# Patient Record
Sex: FEMALE | Race: WHITE | NOT HISPANIC OR LATINO | Employment: OTHER | URBAN - METROPOLITAN AREA
[De-identification: names, ages, dates, MRNs, and addresses within clinical notes are randomized per-mention and may not be internally consistent; named-entity substitution may affect disease eponyms.]

---

## 2018-03-02 ENCOUNTER — COMMUNICATION - HEALTHEAST (OUTPATIENT)
Dept: ENDOCRINOLOGY | Facility: CLINIC | Age: 58
End: 2018-03-02

## 2018-03-05 ENCOUNTER — AMBULATORY - HEALTHEAST (OUTPATIENT)
Dept: PULMONOLOGY | Facility: OTHER | Age: 58
End: 2018-03-05

## 2018-03-05 DIAGNOSIS — R91.1 LUNG NODULE: ICD-10-CM

## 2018-03-06 ENCOUNTER — COMMUNICATION - HEALTHEAST (OUTPATIENT)
Dept: PULMONOLOGY | Facility: OTHER | Age: 58
End: 2018-03-06

## 2018-03-12 ENCOUNTER — OFFICE VISIT - HEALTHEAST (OUTPATIENT)
Dept: GERIATRICS | Facility: CLINIC | Age: 58
End: 2018-03-12

## 2018-03-12 DIAGNOSIS — J90 LOCULATED PLEURAL EFFUSION: ICD-10-CM

## 2018-03-12 DIAGNOSIS — E05.91 THYROTOXICOSIS WITH THYROTOXIC CRISIS, UNSPECIFIED THYROTOXICOSIS TYPE: ICD-10-CM

## 2018-03-12 DIAGNOSIS — Z72.0 TOBACCO USER: ICD-10-CM

## 2018-03-12 DIAGNOSIS — I48.19 PERSISTENT ATRIAL FIBRILLATION (H): ICD-10-CM

## 2018-03-12 DIAGNOSIS — I50.20 SYSTOLIC CHF (H): ICD-10-CM

## 2018-03-12 DIAGNOSIS — I26.09 OTHER ACUTE PULMONARY EMBOLISM WITH ACUTE COR PULMONALE (H): ICD-10-CM

## 2018-03-14 ENCOUNTER — AMBULATORY - HEALTHEAST (OUTPATIENT)
Dept: GERIATRICS | Facility: CLINIC | Age: 58
End: 2018-03-14

## 2018-03-21 ENCOUNTER — COMMUNICATION - HEALTHEAST (OUTPATIENT)
Dept: ENDOCRINOLOGY | Facility: CLINIC | Age: 58
End: 2018-03-21

## 2018-03-21 DIAGNOSIS — E05.91 THYROTOXICOSIS WITH THYROTOXIC CRISIS, UNSPECIFIED THYROTOXICOSIS TYPE: ICD-10-CM

## 2018-03-22 ENCOUNTER — OFFICE VISIT - HEALTHEAST (OUTPATIENT)
Dept: GERIATRICS | Facility: CLINIC | Age: 58
End: 2018-03-22

## 2018-03-22 DIAGNOSIS — I26.99 PULMONARY INFARCT (H): ICD-10-CM

## 2018-03-22 DIAGNOSIS — Z86.718 HISTORY OF DVT (DEEP VEIN THROMBOSIS): ICD-10-CM

## 2018-03-22 DIAGNOSIS — I51.9 LV DYSFUNCTION: ICD-10-CM

## 2018-03-22 DIAGNOSIS — J18.9 PNEUMONIA OF LEFT UPPER LOBE DUE TO INFECTIOUS ORGANISM: ICD-10-CM

## 2018-03-22 DIAGNOSIS — Z72.0 TOBACCO USER: ICD-10-CM

## 2018-03-22 DIAGNOSIS — Z86.711 HISTORY OF PULMONARY EMBOLISM: ICD-10-CM

## 2018-03-22 DIAGNOSIS — Z86.39 HISTORY OF GRAVES' DISEASE: ICD-10-CM

## 2018-03-22 DIAGNOSIS — I48.19 PERSISTENT ATRIAL FIBRILLATION (H): ICD-10-CM

## 2018-03-23 ENCOUNTER — OFFICE VISIT - HEALTHEAST (OUTPATIENT)
Dept: CARDIOLOGY | Facility: CLINIC | Age: 58
End: 2018-03-23

## 2018-03-23 ENCOUNTER — AMBULATORY - HEALTHEAST (OUTPATIENT)
Dept: CARDIOLOGY | Facility: CLINIC | Age: 58
End: 2018-03-23

## 2018-03-23 DIAGNOSIS — I50.23 ACUTE ON CHRONIC SYSTOLIC CONGESTIVE HEART FAILURE (H): ICD-10-CM

## 2018-03-23 DIAGNOSIS — I48.19 PERSISTENT ATRIAL FIBRILLATION (H): ICD-10-CM

## 2018-03-23 DIAGNOSIS — I51.9 LV DYSFUNCTION: ICD-10-CM

## 2018-03-23 DIAGNOSIS — I26.09 OTHER ACUTE PULMONARY EMBOLISM WITH ACUTE COR PULMONALE (H): ICD-10-CM

## 2018-03-23 ASSESSMENT — MIFFLIN-ST. JEOR: SCORE: 1072.56

## 2018-03-25 ENCOUNTER — RECORDS - HEALTHEAST (OUTPATIENT)
Dept: LAB | Facility: CLINIC | Age: 58
End: 2018-03-25

## 2018-03-25 LAB
ANION GAP SERPL CALCULATED.3IONS-SCNC: 12 MMOL/L (ref 5–18)
BUN SERPL-MCNC: 18 MG/DL (ref 8–22)
CALCIUM SERPL-MCNC: 9.4 MG/DL (ref 8.5–10.5)
CHLORIDE BLD-SCNC: 109 MMOL/L (ref 98–107)
CO2 SERPL-SCNC: 20 MMOL/L (ref 22–31)
CREAT SERPL-MCNC: 0.57 MG/DL (ref 0.6–1.1)
GFR SERPL CREATININE-BSD FRML MDRD: >60 ML/MIN/1.73M2
GLUCOSE BLD-MCNC: 67 MG/DL (ref 70–125)
HGB BLD-MCNC: 10.7 G/DL (ref 12–16)
MAGNESIUM SERPL-MCNC: 1.9 MG/DL (ref 1.8–2.6)
POTASSIUM BLD-SCNC: 3.9 MMOL/L (ref 3.5–5)
SODIUM SERPL-SCNC: 141 MMOL/L (ref 136–145)

## 2018-03-26 ENCOUNTER — OFFICE VISIT - HEALTHEAST (OUTPATIENT)
Dept: GERIATRICS | Facility: CLINIC | Age: 58
End: 2018-03-26

## 2018-03-26 DIAGNOSIS — Z86.711 HISTORY OF PULMONARY EMBOLISM: ICD-10-CM

## 2018-03-26 DIAGNOSIS — Z86.718 HISTORY OF DVT (DEEP VEIN THROMBOSIS): ICD-10-CM

## 2018-03-26 DIAGNOSIS — I42.0 DILATED CARDIOMYOPATHY (H): ICD-10-CM

## 2018-03-26 DIAGNOSIS — Z72.0 TOBACCO USER: ICD-10-CM

## 2018-03-26 DIAGNOSIS — I48.19 PERSISTENT ATRIAL FIBRILLATION (H): ICD-10-CM

## 2018-03-26 DIAGNOSIS — Z86.39 HISTORY OF GRAVES' DISEASE: ICD-10-CM

## 2018-03-26 ASSESSMENT — MIFFLIN-ST. JEOR: SCORE: 1068.48

## 2018-03-27 ENCOUNTER — RECORDS - HEALTHEAST (OUTPATIENT)
Dept: LAB | Facility: CLINIC | Age: 58
End: 2018-03-27

## 2018-03-28 LAB
T3FREE SERPL-MCNC: 2.7 PG/ML (ref 1.9–3.9)
T4 FREE SERPL-MCNC: 1.2 NG/DL (ref 0.7–1.8)
TSH SERPL DL<=0.005 MIU/L-ACNC: <0.01 UIU/ML (ref 0.3–5)

## 2018-03-29 ENCOUNTER — OFFICE VISIT - HEALTHEAST (OUTPATIENT)
Dept: ENDOCRINOLOGY | Facility: CLINIC | Age: 58
End: 2018-03-29

## 2018-03-29 ENCOUNTER — AMBULATORY - HEALTHEAST (OUTPATIENT)
Dept: ENDOCRINOLOGY | Facility: CLINIC | Age: 58
End: 2018-03-29

## 2018-03-29 DIAGNOSIS — E05.90 HYPERTHYROIDISM: ICD-10-CM

## 2018-03-29 DIAGNOSIS — E05.91 THYROTOXICOSIS WITH THYROTOXIC CRISIS, UNSPECIFIED THYROTOXICOSIS TYPE: ICD-10-CM

## 2018-03-30 ENCOUNTER — OFFICE VISIT - HEALTHEAST (OUTPATIENT)
Dept: GERIATRICS | Facility: CLINIC | Age: 58
End: 2018-03-30

## 2018-03-30 DIAGNOSIS — I48.19 PERSISTENT ATRIAL FIBRILLATION (H): ICD-10-CM

## 2018-03-30 DIAGNOSIS — I26.99 PULMONARY EMBOLI (H): ICD-10-CM

## 2018-03-30 DIAGNOSIS — E05.00 GRAVES DISEASE: ICD-10-CM

## 2018-03-30 DIAGNOSIS — I26.99 PULMONARY INFARCT (H): ICD-10-CM

## 2018-03-30 DIAGNOSIS — R91.8 PULMONARY MASS: ICD-10-CM

## 2018-03-30 DIAGNOSIS — I51.9 LEFT VENTRICULAR DYSFUNCTION: ICD-10-CM

## 2018-03-30 DIAGNOSIS — J18.9 LEFT UPPER LOBE PNEUMONIA: ICD-10-CM

## 2018-03-30 DIAGNOSIS — I82.4Z1 DVT, LOWER EXTREMITY, DISTAL, ACUTE, RIGHT (H): ICD-10-CM

## 2018-03-30 DIAGNOSIS — F17.200 TOBACCO DEPENDENCE: ICD-10-CM

## 2018-04-02 ENCOUNTER — COMMUNICATION - HEALTHEAST (OUTPATIENT)
Dept: ADMINISTRATIVE | Facility: CLINIC | Age: 58
End: 2018-04-02

## 2018-04-02 ENCOUNTER — OFFICE VISIT - HEALTHEAST (OUTPATIENT)
Dept: CARDIOLOGY | Facility: CLINIC | Age: 58
End: 2018-04-02

## 2018-04-02 ENCOUNTER — AMBULATORY - HEALTHEAST (OUTPATIENT)
Dept: GERIATRICS | Facility: CLINIC | Age: 58
End: 2018-04-02

## 2018-04-02 DIAGNOSIS — I48.91 ATRIAL FIBRILLATION WITH RVR (H): ICD-10-CM

## 2018-04-02 DIAGNOSIS — I42.0 DILATED CARDIOMYOPATHY (H): ICD-10-CM

## 2018-04-02 DIAGNOSIS — I48.19 PERSISTENT ATRIAL FIBRILLATION (H): ICD-10-CM

## 2018-04-02 DIAGNOSIS — I50.23 ACUTE ON CHRONIC SYSTOLIC CONGESTIVE HEART FAILURE (H): ICD-10-CM

## 2018-04-02 ASSESSMENT — MIFFLIN-ST. JEOR: SCORE: 1081.64

## 2018-04-03 ENCOUNTER — AMBULATORY - HEALTHEAST (OUTPATIENT)
Dept: CARDIOLOGY | Facility: CLINIC | Age: 58
End: 2018-04-03

## 2018-04-24 ENCOUNTER — RECORDS - HEALTHEAST (OUTPATIENT)
Dept: ADMINISTRATIVE | Facility: OTHER | Age: 58
End: 2018-04-24

## 2018-04-27 ENCOUNTER — OFFICE VISIT - HEALTHEAST (OUTPATIENT)
Dept: CARDIOLOGY | Facility: CLINIC | Age: 58
End: 2018-04-27

## 2018-04-27 DIAGNOSIS — I48.19 PERSISTENT ATRIAL FIBRILLATION (H): ICD-10-CM

## 2018-04-27 LAB
ATRIAL RATE - MUSE: 72 BPM
DIASTOLIC BLOOD PRESSURE - MUSE: NORMAL MMHG
INTERPRETATION ECG - MUSE: NORMAL
P AXIS - MUSE: NORMAL DEGREES
PR INTERVAL - MUSE: NORMAL MS
QRS DURATION - MUSE: 126 MS
QT - MUSE: 440 MS
QTC - MUSE: 461 MS
R AXIS - MUSE: 78 DEGREES
SYSTOLIC BLOOD PRESSURE - MUSE: NORMAL MMHG
T AXIS - MUSE: -81 DEGREES
VENTRICULAR RATE- MUSE: 66 BPM

## 2018-04-27 RX ORDER — FERROUS SULFATE 325(65) MG
1 TABLET ORAL
Status: SHIPPED | COMMUNITY
Start: 2018-04-27 | End: 2022-10-20

## 2018-04-27 RX ORDER — ESCITALOPRAM OXALATE 10 MG/1
10 TABLET ORAL DAILY
Status: SHIPPED | COMMUNITY
Start: 2018-04-27 | End: 2022-10-20

## 2018-04-27 ASSESSMENT — MIFFLIN-ST. JEOR: SCORE: 1090.71

## 2018-05-02 ENCOUNTER — AMBULATORY - HEALTHEAST (OUTPATIENT)
Dept: CARDIOLOGY | Facility: CLINIC | Age: 58
End: 2018-05-02

## 2018-05-04 ENCOUNTER — ANESTHESIA - HEALTHEAST (OUTPATIENT)
Dept: CARDIOLOGY | Facility: CLINIC | Age: 58
End: 2018-05-04

## 2018-05-17 ENCOUNTER — COMMUNICATION - HEALTHEAST (OUTPATIENT)
Dept: CARDIOLOGY | Facility: CLINIC | Age: 58
End: 2018-05-17

## 2018-06-07 ENCOUNTER — RECORDS - HEALTHEAST (OUTPATIENT)
Dept: ADMINISTRATIVE | Facility: OTHER | Age: 58
End: 2018-06-07

## 2018-07-12 ENCOUNTER — OFFICE VISIT - HEALTHEAST (OUTPATIENT)
Dept: CARDIOLOGY | Facility: CLINIC | Age: 58
End: 2018-07-12

## 2018-07-12 DIAGNOSIS — I48.91 ATRIAL FIBRILLATION WITH RVR (H): ICD-10-CM

## 2018-07-12 DIAGNOSIS — I48.19 PERSISTENT ATRIAL FIBRILLATION (H): ICD-10-CM

## 2018-07-12 DIAGNOSIS — I50.23 ACUTE ON CHRONIC SYSTOLIC CONGESTIVE HEART FAILURE (H): ICD-10-CM

## 2018-07-12 RX ORDER — METOPROLOL TARTRATE 25 MG/1
TABLET, FILM COATED ORAL
Qty: 30 TABLET | Refills: 3 | Status: SHIPPED
Start: 2018-07-12 | End: 2022-10-20

## 2018-07-12 ASSESSMENT — MIFFLIN-ST. JEOR: SCORE: 1131.53

## 2018-09-24 ENCOUNTER — AMBULATORY - HEALTHEAST (OUTPATIENT)
Dept: LAB | Facility: CLINIC | Age: 58
End: 2018-09-24

## 2018-09-24 DIAGNOSIS — E05.91 THYROTOXICOSIS WITH THYROTOXIC CRISIS, UNSPECIFIED THYROTOXICOSIS TYPE: ICD-10-CM

## 2018-09-24 LAB
T3 SERPL-MCNC: 69 NG/DL (ref 45–175)
T4 FREE SERPL-MCNC: 0.7 NG/DL (ref 0.7–1.8)
TSH SERPL DL<=0.005 MIU/L-ACNC: 3.34 UIU/ML (ref 0.3–5)

## 2018-10-01 ENCOUNTER — OFFICE VISIT - HEALTHEAST (OUTPATIENT)
Dept: ENDOCRINOLOGY | Facility: CLINIC | Age: 58
End: 2018-10-01

## 2018-10-01 DIAGNOSIS — I48.91 ATRIAL FIBRILLATION WITH RVR (H): ICD-10-CM

## 2018-10-01 DIAGNOSIS — E05.91 THYROTOXICOSIS WITH THYROTOXIC CRISIS, UNSPECIFIED THYROTOXICOSIS TYPE: ICD-10-CM

## 2018-10-01 RX ORDER — METHIMAZOLE 10 MG/1
10 TABLET ORAL DAILY
Qty: 90 TABLET | Refills: 1 | Status: SHIPPED | OUTPATIENT
Start: 2018-10-01 | End: 2022-10-20

## 2018-10-01 ASSESSMENT — MIFFLIN-ST. JEOR: SCORE: 1140.6

## 2019-02-24 ENCOUNTER — COMMUNICATION - HEALTHEAST (OUTPATIENT)
Dept: CARDIOLOGY | Facility: CLINIC | Age: 59
End: 2019-02-24

## 2019-02-24 DIAGNOSIS — I48.19 PERSISTENT ATRIAL FIBRILLATION (H): ICD-10-CM

## 2019-02-25 RX ORDER — RIVAROXABAN 20 MG/1
TABLET, FILM COATED ORAL
Qty: 30 TABLET | Refills: 1 | Status: ON HOLD | OUTPATIENT
Start: 2019-02-25 | End: 2022-10-23

## 2019-03-08 ENCOUNTER — AMBULATORY - HEALTHEAST (OUTPATIENT)
Dept: ENDOCRINOLOGY | Facility: CLINIC | Age: 59
End: 2019-03-08

## 2019-03-08 DIAGNOSIS — E05.90 HYPERTHYROIDISM: ICD-10-CM

## 2019-03-25 ENCOUNTER — AMBULATORY - HEALTHEAST (OUTPATIENT)
Dept: LAB | Facility: CLINIC | Age: 59
End: 2019-03-25

## 2019-03-25 DIAGNOSIS — E05.90 HYPERTHYROIDISM: ICD-10-CM

## 2019-03-25 LAB
T3 SERPL-MCNC: 90 NG/DL (ref 45–175)
T4 FREE SERPL-MCNC: 1.1 NG/DL (ref 0.7–1.8)
TSH SERPL DL<=0.005 MIU/L-ACNC: 0.04 UIU/ML (ref 0.3–5)

## 2019-04-01 ENCOUNTER — COMMUNICATION - HEALTHEAST (OUTPATIENT)
Dept: ENDOCRINOLOGY | Facility: CLINIC | Age: 59
End: 2019-04-01

## 2019-04-01 DIAGNOSIS — E05.90 HYPERTHYROIDISM: ICD-10-CM

## 2019-07-01 ENCOUNTER — COMMUNICATION - HEALTHEAST (OUTPATIENT)
Dept: CARDIOLOGY | Facility: CLINIC | Age: 59
End: 2019-07-01

## 2020-01-11 ENCOUNTER — COMMUNICATION - HEALTHEAST (OUTPATIENT)
Dept: ENDOCRINOLOGY | Facility: CLINIC | Age: 60
End: 2020-01-11

## 2020-01-11 DIAGNOSIS — E05.90 HYPERTHYROIDISM: ICD-10-CM

## 2020-03-09 ENCOUNTER — COMMUNICATION - HEALTHEAST (OUTPATIENT)
Dept: ADMINISTRATIVE | Facility: CLINIC | Age: 60
End: 2020-03-09

## 2020-03-09 RX ORDER — METHIMAZOLE 5 MG/1
TABLET ORAL
Qty: 90 TABLET | Refills: 0 | Status: SHIPPED | OUTPATIENT
Start: 2020-03-09 | End: 2022-10-20

## 2021-05-27 NOTE — TELEPHONE ENCOUNTER
----- Message from Rashaad Lyons MD sent at 3/31/2019  6:55 AM CDT -----  Please increase tapazole to 15 mg daily. Check same labs in 3 months.

## 2021-05-31 VITALS — WEIGHT: 121 LBS | BODY MASS INDEX: 20.77 KG/M2

## 2021-06-01 VITALS — HEIGHT: 64 IN | BODY MASS INDEX: 19.24 KG/M2

## 2021-06-01 VITALS — WEIGHT: 117 LBS | HEIGHT: 64 IN | BODY MASS INDEX: 19.97 KG/M2

## 2021-06-01 VITALS — HEIGHT: 64 IN | WEIGHT: 115 LBS | BODY MASS INDEX: 19.63 KG/M2

## 2021-06-01 VITALS — BODY MASS INDEX: 21.85 KG/M2 | WEIGHT: 128 LBS | HEIGHT: 64 IN

## 2021-06-01 VITALS — BODY MASS INDEX: 21.51 KG/M2 | HEIGHT: 64 IN | WEIGHT: 126 LBS

## 2021-06-01 VITALS — BODY MASS INDEX: 19.14 KG/M2 | WEIGHT: 112.1 LBS | HEIGHT: 64 IN

## 2021-06-01 VITALS — WEIGHT: 113 LBS | BODY MASS INDEX: 19.29 KG/M2 | HEIGHT: 64 IN

## 2021-06-05 NOTE — TELEPHONE ENCOUNTER
Patient is aware of Dr. Lyons's MARCELLE. She would like a 30 day supply and will est care with another provider.

## 2021-06-06 NOTE — TELEPHONE ENCOUNTER
Date: 7/2/2020 Status: Can   Time: 2:40 PM Length: 40   Visit Type: OFFICE VISIT LONG [[id number]] Copay: $0.00   Provider: Princess Rangel NP Department: [facility] ENDOCRINOLOGY   Referring Provider: JEFFY NGUEYN CSN: [id number]   Notes: PAMELA of [place] - thyroid   Made On:  Canceled: 3/10/2020 4:13 PM  3/11/2020 9:41 AM By:  By: CAMMY ERIC STEPHANIE   Cancel Rsn: Provider Initiated (per BK, pt should see an endo provider / pt notified)     Pt states she wants to stay in the [place] area. Notified her that she can check with her insurance to make sure they are in network. But there are [facility] and [facility]. She will check with [facility].

## 2021-06-06 NOTE — TELEPHONE ENCOUNTER
Pt has an appt with harshil in July. She will try see if PCP can do med refills until she is seen.

## 2021-06-06 NOTE — TELEPHONE ENCOUNTER
She does indeed look a bit tricky - I certainly would not be in the position to get her through another crisis. I think that she should be with an actual Endo, but I can manage until this can be arranged.

## 2021-06-06 NOTE — TELEPHONE ENCOUNTER
Patient called. She was not able to find another provider and would like another 30 day supply if possible and will continue to find another provider.     Kallie @ [phone]

## 2021-06-16 PROBLEM — Z86.718 HISTORY OF DVT (DEEP VEIN THROMBOSIS): Status: ACTIVE | Noted: 2018-03-26

## 2021-06-16 PROBLEM — J18.9 PNEUMONIA: Status: ACTIVE | Noted: 2018-03-12

## 2021-06-16 PROBLEM — I48.19 PERSISTENT ATRIAL FIBRILLATION (H): Status: ACTIVE | Noted: 2018-03-02

## 2021-06-16 PROBLEM — D72.829 LEUKOCYTOSIS: Status: ACTIVE | Noted: 2018-03-15

## 2021-06-16 NOTE — PROGRESS NOTES
[facility]    Name:   Kallie Barker  : 1960  Facility:   [facility] [[id number]]   Room: TCU2-210  Code Status: FULL CODE -   Fac type:   SNF (Skilled Nursing Facility, TCU) -     CHIEF COMPLAINT / REASON FOR VISIT:  Chief Complaint   Patient presents with     Review Of Multiple Medical Conditions     TCU follow-up after hospitalization with pneumonia.  She had been hospitalized not that long before with DVT/PE, atrial fibrillation with RVR, and acute systolic CHF (with biventricular dysfunction and an EF of 20%).   [facility] from 18 until 03/10/18  [facility] from 03/10/18 until 18  [facility] from 18 until 18    Patient was last seen by Dr. Levy for an admission visit on 18.    HPI: Kallie is a 58 y.o. female who claims she was not feeling well since New Year's Anabela.  She continued to get worse and was sleeping on her couch, as it was too much trouble going upstairs to the bedroom.  When she began having trouble getting to the bathroom even with her 's health, she found herself at [facility] where she was found to have:    Acute submassive pulmonary embolism, right leg DVT  RV dysfunction (at one point believed to be biventricular)  New atrial fibrillation with RVR  Acute systolic CHF (EF 20%)  Loculated large left pleural effusion, status post thoracentesis   Questionable left upper lobe lung mass (identified on chest CT)  Acute hypoxemic respiratory failure (discharged on oxygen)  Leukocytosis (likely felt secondary to pulmonary infarct)  History of Graves' disease and likely thyrotoxicosis  Tobacco use (heavy, estimating 3 packs per day)    She had been diagnosed with Graves' disease in  but never started taking her methimazole.    She left there after 8 days with a subsequent trip to the [facility] TCU.  There, she spiked a temp of 102 F, irregular heartbeats,  elevated white count, and hypoxia, and was sent to [facility].  A CT of the chest showed improved bilateral PE but extensive consolidation, and the patient was treated for pneumonia with 7 days of vancomycin and Zosyn.  She was subsequently seen by pulmonary, recommending a 14 day course of doxycycline.  She was also treated for congestive heart failure and atrial fibrillation (with uncontrolled heart rate per cardiology).    She was seen by cardiology and an electrophysiologist, and her digoxin and metoprolol were increased.  She will be reevaluated for possible ablation once her current medical condition improves.  She underwent repeat echocardiograms, and her ejection fraction improved.  It was 35% on 03/13/18 and 52% on 03/19/18.  She was discharged with a Holter monitor, and it was suggested that she would need close outpatient cardiology follow-up.  [facility] cardiology was consulted, and they recommended cardioversion once patient has been anticoagulated for a few weeks, and her hyperthyroidism is well treated.  Of course, she will also need follow-up with endocrinology.    Note that the chest CT did show persistent soft tissue thickening of the left hilum, and an underlying mass cannot be excluded.  Again, a short-term chest CT follow-up is recommended after treatment.      CURRENT ISSUES    The patient tells me she has been a smoker her whole life, and she is currently on a 14 mg nicotine transdermal patch.    She feels she is doing well.  She no longer requires oxygen.  She knows she is here to get stronger.    ROS: No headaches or chest pains, coughing or congestion, nausea or vomiting, dizziness or dyspnea, dysuria, constipation or diarrhea, difficulty chewing or swallowing, integumentary issues, or problems with appetite or sleep.    Past Medical History:   Diagnosis Date     Acute respiratory failure      Atrial fibrillation      Atrial fibrillation with RVR      Loculated pleural effusion       Pulmonary embolism      Systolic CHF      Thyrotoxicosis with thyrotoxic crisis               No family history on file.  Social History     Social History     Marital status:      Spouse name: N/A     Number of children: N/A     Years of education: N/A     Social History Main Topics     Smoking status: Former Smoker     Packs/day: 1.00     Years: 30.00     Types: Cigarettes     Quit date: 2/23/2018     Smokeless tobacco: Never Used     Alcohol use No     Drug use: No     Sexual activity: Not on file     Other Topics Concern     Not on file     Social History Narrative     MEDICATIONS: Reviewed from the MAR, physician orders, and earlier progress notes.  Current Outpatient Prescriptions   Medication Sig     dextromethorphan (DELSYM 12 HOUR) 30 mg/5 mL liquid Take 60 mg by mouth 2 (two) times a day as needed for cough.     digoxin (LANOXIN) 250 mcg tablet Take 1 tablet (250 mcg total) by mouth daily with supper.     doxycycline (MONODOX) 100 MG capsule Take 1 capsule (100 mg total) by mouth 2 (two) times a day for 7 days.     ferrous sulfate 325 (65 FE) MG tablet Take 1 tablet (325 mg total) by mouth 2 (two) times a day with meals.     furosemide (LASIX) 20 MG tablet Take 2 tablets (40 mg total) by mouth daily.     methIMAzole (TAPAZOLE) 10 MG tablet Take 1 tablet (10 mg total) by mouth 3 (three) times a day.     metoprolol tartrate (LOPRESSOR) 100 MG tablet Take 1 tablet (100 mg total) by mouth 2 (two) times a day.     nicotine (NICODERM CQ) 14 mg/24 hr Place 1 patch on the skin daily.     omeprazole (PRILOSEC) 20 MG capsule Take 1 capsule (20 mg total) by mouth daily before breakfast for 14 days.     rivaroxaban 15 mg Tab Take 1 tablet (15 mg total) by mouth 2 (two) times a day with meals. 15mg BID for 3  days followed by 20mg daily with food indefinitely     ALLERGIES: No Known Allergies    DIET: Cardiac, 2 g sodium restriction, regular texture, thin liquids.    Vitals:    03/26/18 1607   BP: 120/65  "  Pulse: 67   Resp: 18   Temp: 99.2  F (37.3  C)   Weight: 112 lb 1.6 oz (50.8 kg)   Height: 5' 4\" (1.626 m)     Body mass index is 19.24 kg/(m^2).    EXAMINATION:   General: Pleasant, alert, and conversant middle-aged female, sitting on her bed, in no apparent distress.  Head: Normocephalic and atraumatic.   Eyes: PERRLA, sclerae clear.   ENT: Moist oral mucosa.  She has her own teeth.  No rhinorrhea or nasal discharge.  Hearing is unimpaired.  She has mild goiter.  Cardiovascular: Irregularly irregular rhythm with a wide split S2 and a 3/6 systolic ejection murmur at the left sternal border.  Respiratory: Lungs clear to auscultation bilaterally.   Abdomen: Soft and nontender.   Musculoskeletal/Extremities: No peripheral edema.  Integument: No rashes, clinically significant lesions, or skin breakdown.   Cognitive/Psychiatric: Alert and oriented ×3.  Affect is euthymic.    DIAGNOSTICS:   Results for orders placed or performed during the hospital encounter of 03/12/18   Basic Metabolic Panel   Result Value Ref Range    Sodium 135 (L) 136 - 145 mmol/L    Potassium 4.1 3.5 - 5.0 mmol/L    Chloride 105 98 - 107 mmol/L    CO2 23 22 - 31 mmol/L    Anion Gap, Calculation 7 5 - 18 mmol/L    Glucose 95 70 - 125 mg/dL    Calcium 9.1 8.5 - 10.5 mg/dL    BUN 14 8 - 22 mg/dL    Creatinine 0.57 (L) 0.60 - 1.10 mg/dL    GFR MDRD Af Amer >60 >60 mL/min/1.73m2    GFR MDRD Non Af Amer >60 >60 mL/min/1.73m2     Lab Results   Component Value Date    WBC 9.6 03/21/2018    HGB 10.9 (L) 03/21/2018    HCT 35.3 03/21/2018    MCV 75 (L) 03/21/2018     (H) 03/21/2018     CrCl cannot be calculated (Patient's most recent sCr result is older than the maximum 5 days allowed.).     Lab Results   Component Value Date    TSH <0.01 (L) 03/02/2018     ASSESSMENT/Plan:      ICD-10-CM    1. Dilated cardiomyopathy I42.0    2. History of Graves' disease Z86.39    3. Persistent atrial fibrillation I48.1    4. Tobacco user Z72.0    5. History of DVT " (deep vein thrombosis) Z86.718    6. History of pulmonary embolism Z86.711      CHANGES:    None.    CARE PLAN:    The care plan has been reviewed and all orders signed. Changes to care plan, if any, as noted. Otherwise, continue care plan of care.  Time spent with this complicated patient was approximately 40 minutes, with greater than 50% spent in counseling and coordination of care that included a lengthy discussion with the patient regarding her history, how she felt about her own health care management (including tobacco cessation), and many follow-up appointments that will follow once she is discharged.    The above has been created using voice recognition software. Please be aware that this may unintentionally  produce inaccuracies and/or nonsensical sentences.      Electronically signed by: Abhinav Solis CNP

## 2021-06-16 NOTE — PROGRESS NOTES
Assessment/Plan:     1.  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  Noted to have her ejection fraction around 35% on 3/13/2018 likely tachy-mediated and was improved to 52% on 3/19/2018. No acute signs and symptoms of heart failure.  Reports mild shortness of breath on exertion.  She has been participating in rehab for 30 minutes at a time without issues. She is following low-sodium diet and her weight has been stable.  She reports drinking fluid about 80+ ounces per day.    No medication changes made today.  We discussed about heart failure symptoms, medication management, and lifestyle management including low-sodium diet and regular physical exercise.  She met with the heart failure nurse clinician for heart failure education and management.  Her most recent BMP was done on 3/21/2018 and was stable.    Heart Failure Medications include:  - Metoprolol Tartrate 100 mg BID. May consider changing metoprolol succinate although her EF has normalized.  - Not started on ACEI/ARB today given same above reason.  - Furosemide 40 mg daily.     2.  Persistent atrial fibrillation: Heart rate controlled in 70s.  Patient reports her heart rate has been monitored during her physical therapy and has been stable although she was not able to recall the exact number.  Encouraged to call if her heart rate stays persistently >110 and having symptoms of chest discomfort, lightheaded dizziness, heart palpitation or shortness of breath.     [id number] Score is 4 and she is on Xarelto 15 mg twice a day.  Per discharge instructions to stay on Xarelto 15 mg twice a day for 3 days and then switch to 20 mg daily and possible cardioversion once on anticoagulation for at least 3 weeks.  Patient is scheduled to be seen in A. fib clinic on 4/2/18. Per hospital note,  on Xarelto since 3/10/18. Dr. Correa recommended Holter monitor but patient declined.  On digoxin 240 MCG  daily and metoprolol tartrate 100 mg twice a day.  Her digoxin level was 0.5 on 3/18/2018 and was recommended to recheck given her digoxin was recently increased.      Her blood pressure is 108/72, heart rate 72.      3.  Recent diagnosis of PE: Stable -on anticoagulant.  Defer to PCP     4.  Graves' disease : On methimazole 10 mg daily she is scheduled to see endocrinology on 3/29/2018 .     Follow up with Sidra Baltazar CNP on 4/2/18 as scheduled. F/u with Dr. Gonzales in 2-3 weeks and HF clinic in 6-8 weeks    Subjective:     Kallie Barker is a 58 y.o. years old  with a significant PMH of pulmonary embolism, Graves' disease, pleural effusion, cataract (needs cataract surgery), thyrotoxicosis with thyrotoxic crisis, pulmonary infarct, dilated cardiomyopathy with systolic dysfunction, and atrial fibrillation with RVR who is seen at [facility] for posthospitalization follow up per Dr. Correa.  She was hospitalized from 3/12/18-3/22/2018 with hypoxia, fever, and elevated white blood count.  She was also noted to be in heart failure exacerbation with BNP elevated up in 2000 's and A. fib with RVR.  Her metoprolol and digoxin doses were increased.  Her recent echo showed an EF of 35% and repeat echo on 3/19/2018 showed an EF of 52%.  She was followed by Dr. Correa in the hospital and recommended Holter as an outpatient. Dr. Correa also suggested stress test as an out patient for ischemic evaluation. She was also followed by Dr. Villaseñor and recommended cardioversion once she is on the anticoagulation for appropriate time andher hyperthyroidism is treated well.    Patient presented to the heart care clinic accompanied by her .  She is currently at the TCU undergoing rehab.  Since she was discharged from the hospital yesterday, she reports she has been doing well and feeling better.  She has been able to participate in therapy for 30 minutes session at a time without difficulties although complains of having  mild shortness of breath on exertion.  She further reported that they have been monitoring her heart rate during the activity and has been stable although she is not able to recall the exact number.  Patient declined to wear Holter monitor as an outpatient also reported vision impairment from the cataract and needs cataract surgery. She denies fatigue, lightheadedness, shortness of breath, orthopnea, PND, palpitations, chest pain, abdominal fullness/bloating and lower extremity edema.      She reports her weight has been remained stable at the facility although unable to give the exact number. Her weight today in the clinic was 113 lbs. She has been following low-sodium diet and participating in the rehab.     Review of Systems:   General: Fever, Night Sweats  Eyes: Visual Distubance  Ears/Nose/Throat: WNL  Lungs: Shortness of Breath  Heart: Shortness of Breath with activity, Irregular Heartbeat  Stomach: WNL  Bladder: WNL  Muscle/Joints: Muscle Weakness  Skin: WNL  Nervous System: Daytime Sleepiness  Mental Health: WNL     Blood: Easy Bruising     Patient Active Problem List   Diagnosis     Persistent atrial fibrillation     Loculated pleural effusion     PE     Thyrotoxicosis with thyrotoxic crisis, unspecified thyrotoxicosis type     Tobacco user     Pneumonia     Pulmonary infarct     History of pulmonary embolism     History of Graves' disease     Dilated cardiomyopathy     Acute on chronic systolic congestive heart failure     Leukocytosis     Fever, unspecified fever cause     LV dysfunction       Past Medical History:   Diagnosis Date     Acute respiratory failure      Atrial fibrillation      Atrial fibrillation with RVR      Loculated pleural effusion      Pulmonary embolism      Systolic CHF      Thyrotoxicosis with thyrotoxic crisis        Past Surgical History:   Procedure Laterality Date     PICC  3/13/2018            No family history on file.    Social History     Social History     Marital status:       Spouse name: N/A     Number of children: N/A     Years of education: N/A     Occupational History     Not on file.     Social History Main Topics     Smoking status: Former Smoker     Packs/day: 1.00     Years: 30.00     Types: Cigarettes     Quit date: 2/23/2018     Smokeless tobacco: Never Used     Alcohol use No     Drug use: No     Sexual activity: Not on file     Other Topics Concern     Not on file     Social History Narrative       Current Outpatient Prescriptions   Medication Sig Dispense Refill     dextromethorphan (DELSYM 12 HOUR) 30 mg/5 mL liquid Take 60 mg by mouth 2 (two) times a day as needed for cough.       digoxin (LANOXIN) 250 mcg tablet Take 1 tablet (250 mcg total) by mouth daily with supper. 30 tablet 0     doxycycline (MONODOX) 100 MG capsule Take 1 capsule (100 mg total) by mouth 2 (two) times a day for 7 days. 14 capsule 0     ferrous sulfate 325 (65 FE) MG tablet Take 1 tablet (325 mg total) by mouth 2 (two) times a day with meals.  0     furosemide (LASIX) 20 MG tablet Take 2 tablets (40 mg total) by mouth daily. 30 tablet 0     methIMAzole (TAPAZOLE) 10 MG tablet Take 1 tablet (10 mg total) by mouth 3 (three) times a day. 90 tablet 0     metoprolol tartrate (LOPRESSOR) 100 MG tablet Take 1 tablet (100 mg total) by mouth 2 (two) times a day. 60 tablet 0     nicotine (NICODERM CQ) 14 mg/24 hr Place 1 patch on the skin daily. 28 patch 0     omeprazole (PRILOSEC) 20 MG capsule Take 1 capsule (20 mg total) by mouth daily before breakfast for 14 days. 14 capsule 0     rivaroxaban 15 mg Tab Take 1 tablet (15 mg total) by mouth 2 (two) times a day with meals. 15mg BID for 3  days followed by 20mg daily with food indefinitely 30 tablet 0     No current facility-administered medications for this visit.        No Known Allergies    Objective:     Vitals:    03/23/18 1255   BP: 108/72   Pulse: 72   Resp: 18     Body mass index is 19.4 kg/(m^2).    General Appearance:   Alert, cooperative  and in no acute distress.   HEENT:  No scleral icterus; the mucous membranes were pink and moist. No JVD and negative HJR.   Chest: The spine was straight. The chest was symmetric.   Lungs:   Respirations unlabored; the lungs are clear to auscultation.   Cardiovascular:   Regular rhythm. S1 and S2 without murmur, clicks or rubs. Carotid pulses are intact and symmetrical.  No carotid/radial/pedal pulses are intact. CMS intact. No carotid bruits noted.   Abdomen:  Soft, nontender, nondistended, bowel sounds present   Extremities: No cyanosis, clubbing, or edema.   Skin: No xanthelasma.   Neurologic: Mood and affect are appropriate.             Lab Review   Lab Results   Component Value Date    CREATININE 0.57 (L) 03/21/2018    BUN 14 03/21/2018     (L) 03/21/2018    K 4.1 03/22/2018     03/21/2018    CO2 23 03/21/2018     Creatinine (mg/dL)   Date Value   03/21/2018 0.57 (L)   03/19/2018 0.49 (L)   03/16/2018 0.53 (L)   03/13/2018 0.55 (L)       Cardiographics:  Echocardiogram on 3/19/2018  Summary        Left Ventricle: Normal left ventricular size.The calculated left ventricular ejection fraction is 52%. Mild concentric hypertrophy noted.    Right Ventricle: The right ventricle is mildly dilated. The systolic function is mildly reduced. TAPSE is abnormal, which is consistent with abnormal right ventricular systolic function.    Tricuspid Valve: Mild to moderate tricuspid valve regurgitation. The estimated systolic pulmonary artery pressure is 47+ right atrial pressure mmhg.       40 minutes were spent with the patient with greater than 50% spent on education and counseling.      Lily Olivas, [facility]

## 2021-06-16 NOTE — PROGRESS NOTES
Patient and spouse seen in clinic for HF education s/p recent hospital discharge 3/10/18 and 3/23/18.  Reviewed HF Binder that includes the  HF Sx Awareness & Action plan  handout and  A Stronger Pump  booklet and Weight log booklet highlighting :  __X_patient s type of heart failure _X__Na management in diet  __X_importance of daily wts  _X__Fluid Guidelines, if applicable  __X_medication review and importance of compliance     Instructed patient and spouse in signs and sx of heart failure, reiterated when to call clinic - reviewed HF hotline # [phone] and after hours call # [phone].  Majority of time was spent reviewing: medications, and symptoms.  Patient and spouse verbalized understanding of HF discussion.  Plan for f/u with continued HF education reviewed.  No formal f/u scheduled with nurse clinician for continued education - will continue to reinforce HF management education.

## 2021-06-16 NOTE — PROGRESS NOTES
[facility]      Code Status:  FULL CODE  Visit Type: H & P     Facility:  [facility] [[id number]]           History of Present Illness: Kallie Barker is a 58 y.o. female was currently admitted to the TCU as a transfer from the hospital.  This is a elderly patient with a known history of Graves' disease diagnosed in 2011 was elected not to take any treatment.  She presented to the hospital with ongoing shortness of breath with flulike symptoms for the last 2 months with persistent diarrhea.  She was admitted with an acute large left-sided pulmonary embolism as well as a small right-sided pulmonary emboli and right heart failure with acute hypoxemic respiratory failure.  She was noted to have a loculated left-sided pleural effusion along with atrial fibrillation and there was a concern for thyroid storm along with sepsis lactic acidosis as well as chronic diarrhea.  Initially patient was not given any anticoagulants as it was felt that her lung mass may be relatable to an underlying malignancy and workup however revealed also that she had a right lower extremity DVT she was started on heparin transition over to Xarelto  Cardiology saw her for A. rachael and currently has been discharged on metoprolol and Cardizem  Echocardiogram revealed that she had biventricular dysfunction EF of 20% she was given IV Lasix and now has been discharged on oral Lasix she underwent a thoracentesis in the hospital  And this was felt to be a transudate more consistent with fluid overload no workup has come positive for malignancy as yet and they she will have a follow-up CT chest in the clinic in about 2 months time  Her T3 and T4 levels were checked in the hospital T3 was normal T4 was high.  She was treated for acute thyrotoxicosis with steroids potassium iodide and methimazole she became febrile in the hospital felt to be secondary to pulmonary infarct she has been given empiric Levaquin  She  also had some acute hypoxic respiratory failure requiring oxygen and has been discharged to the TCU.  She been complaining of some left-sided chest pain but otherwise seems to be stable  Appears very weak and stated that she could not walk very well    No past medical history on file.   History of hypothyroidism for which she was refusing treatment  No past surgical history on file.  No family history on file.  Social History     Social History     Marital status:      Spouse name: N/A     Number of children: N/A     Years of education: N/A     Occupational History     Not on file.     Social History Main Topics     Smoking status: Former Smoker     Packs/day: 1.00     Years: 30.00     Types: Cigarettes     Quit date: 2/23/2018     Smokeless tobacco: Not on file     Alcohol use No     Drug use: No     Sexual activity: Not on file     Other Topics Concern     Not on file     Social History Narrative     No narrative on file     Current Outpatient Prescriptions   Medication Sig Dispense Refill     dextromethorphan (DELSYM 12 HOUR) 30 mg/5 mL liquid Take 60 mg by mouth 2 (two) times a day as needed for cough.       diltiazem (DILACOR XR) 180 MG 24 hr capsule Take 1 capsule (180 mg total) by mouth daily. 30 capsule 0     furosemide (LASIX) 20 MG tablet Take 1 tablet (20 mg total) by mouth daily. 30 tablet 0     levoFLOXacin (LEVAQUIN) 750 MG tablet Take 1 tablet (750 mg total) by mouth daily for 4 days. 4 tablet 0     methIMAzole (TAPAZOLE) 10 MG tablet Take 1 tablet (10 mg total) by mouth 3 (three) times a day. 90 tablet 0     metoprolol tartrate (LOPRESSOR) 100 MG tablet Take 1 tablet (100 mg total) by mouth 2 (two) times a day. 60 tablet 0     nicotine (NICODERM CQ) 14 mg/24 hr Place 1 patch on the skin daily. 28 patch 0     rivaroxaban 15 mg Tab Take 1 tablet (15 mg total) by mouth 2 (two) times a day with meals. 15mg BID for 15 days followed by 20mg daily with food 30 tablet 0     No current  facility-administered medications for this visit.      No Known Allergies      Review of Systems:    Constitutional: Negative.  Negative for fever, chills, HAS activity change, appetite change and fatigue.   HENT: Negative for congestion and facial swelling.    Eyes: Negative for photophobia, redness and visual disturbance.   Respiratory: Negative for cough and chest tightness.    Cardiovascular: Negative for chest pain, palpitations and leg swelling.   Complaining of some chest wall discomfort especially in the left side  Gastrointestinal: Negative for nausea, diarrhea, constipation, blood in stool and abdominal distention.   Genitourinary: Negative.    Musculoskeletal: Negative.    Reporting ongoing weakness in the legs she has not been walking  Skin: Negative.    Neurological: Negative for dizziness, tremors, syncope, weakness, light-headedness and headaches.   Hematological: Does not bruise/bleed easily.   Psychiatric/Behavioral: Negative.    Some confusion    Vitals:    03/12/18 1123   BP: 115/78   Pulse: 72   Temp: 98  F (36.7  C)       Physical Exam:    GENERAL: no acute distress. Cooperative in conversation.  Very weak and does have dishevellled appearance  HEENT: pupils are equal, round and reactive. Oral mucosa is moist and intact.  RESP:Chest symmetric. Regular respiratory rate. No stridor.  CVS: S1S2  ABD: Nondistended, soft.  EXTREMITIES: No lower extremity edema.   NEURO: non focal. Alert and oriented x3.   PSYCH: within normal limits. No depression or anxiety.  SKIN: warm dry intact     Labs:    Recent Results (from the past 240 hour(s))   POCT Glucose   Result Value Ref Range    Glucose, POC 98 mg/dL   Troponin I   Result Value Ref Range    Troponin I 0.14 0.00 - 0.29 ng/mL   Magnesium   Result Value Ref Range    Magnesium 1.5 (L) 1.8 - 2.6 mg/dL   MRSA culture   Result Value Ref Range    Culture No MRSA isolated    C. Diff Toxin By PCR   Result Value Ref Range    C.Difficile Toxigenic by PCR  Negative Negative    Ribotype 027/NAP1/B1 Presumptive Negative Presumptive Negative   Culture, Stool   Result Value Ref Range    Culture       No Salmonella, Shigella, Yersinia, or Campylobacter.   Occult Blood, Fecal   Result Value Ref Range    Occult Blood, Stool #1 Positive (!) Negative   POCT Glucose   Result Value Ref Range    Glucose,  mg/dL   Anti-Xa Heparin Level   Result Value Ref Range    Anti-Xa Heparin Assay 0.26 (L) 0.30 - 0.70 IU/mL   Hemoglobin   Result Value Ref Range    Hemoglobin 12.8 12.0 - 16.0 g/dL   POCT Glucose   Result Value Ref Range    Glucose,  mg/dL   Hemoglobin   Result Value Ref Range    Hemoglobin 13.0 12.0 - 16.0 g/dL   POCT Glucose   Result Value Ref Range    Glucose,  mg/dL   Anti-Xa Heparin Level   Result Value Ref Range    Anti-Xa Heparin Assay 0.66 0.30 - 0.70 IU/mL   Comprehensive Metabolic Panel   Result Value Ref Range    Sodium 139 136 - 145 mmol/L    Potassium 4.5 3.5 - 5.0 mmol/L    Chloride 110 (H) 98 - 107 mmol/L    CO2 17 (L) 22 - 31 mmol/L    Anion Gap, Calculation 12 5 - 18 mmol/L    Glucose 120 70 - 125 mg/dL    BUN 57 (H) 8 - 22 mg/dL    Creatinine 0.93 0.60 - 1.10 mg/dL    GFR MDRD Af Amer >60 >60 mL/min/1.73m2    GFR MDRD Non Af Amer >60 >60 mL/min/1.73m2    Bilirubin, Total 1.2 (H) 0.0 - 1.0 mg/dL    Calcium 8.6 8.5 - 10.5 mg/dL    Protein, Total 5.6 (L) 6.0 - 8.0 g/dL    Albumin 2.0 (L) 3.5 - 5.0 g/dL    Alkaline Phosphatase 111 45 - 120 U/L    AST 19 0 - 40 U/L    ALT 12 0 - 45 U/L   BNP(B-type Natriuretic Peptide)   Result Value Ref Range    BNP 1862 (H) 0 - 84 pg/mL   HM1 (CBC with Diff)   Result Value Ref Range    WBC 14.1 (H) 4.0 - 11.0 thou/uL    RBC 5.41 (H) 3.80 - 5.40 mill/uL    Hemoglobin 12.7 12.0 - 16.0 g/dL    Hematocrit 39.6 35.0 - 47.0 %    MCV 73 (L) 80 - 100 fL    MCH 23.5 (L) 27.0 - 34.0 pg    MCHC 32.1 32.0 - 36.0 g/dL    RDW 19.5 (H) 11.0 - 14.5 %    Platelets 147 140 - 440 thou/uL    MPV  8.5 - 12.5 fL    Neutrophils % 86  (H) 50 - 70 %    Lymphocytes % 8 (L) 20 - 40 %    Monocytes % 6 2 - 10 %    Eosinophils % 0 0 - 6 %    Basophils % 0 0 - 2 %    Neutrophils Absolute 12.1 (H) 2.0 - 7.7 thou/uL    Lymphocytes Absolute 1.1 0.8 - 4.4 thou/uL    Monocytes Absolute 0.8 0.0 - 0.9 thou/uL    Eosinophils Absolute 0.0 0.0 - 0.4 thou/uL    Basophils Absolute 0.0 0.0 - 0.2 thou/uL   Magnesium   Result Value Ref Range    Magnesium 2.4 1.8 - 2.6 mg/dL   POCT Glucose   Result Value Ref Range    Glucose,  mg/dL   Anti-Xa Heparin Level   Result Value Ref Range    Anti-Xa Heparin Assay 0.44 0.30 - 0.70 IU/mL   Hemoglobin   Result Value Ref Range    Hemoglobin 12.8 12.0 - 16.0 g/dL   POCT Glucose   Result Value Ref Range    Glucose,  mg/dL   POCT Glucose   Result Value Ref Range    Glucose,  mg/dL   Hemoglobin   Result Value Ref Range    Hemoglobin 13.5 12.0 - 16.0 g/dL   POCT Glucose   Result Value Ref Range    Glucose,  mg/dL   Magnesium   Result Value Ref Range    Magnesium 2.2 1.8 - 2.6 mg/dL   Comprehensive Metabolic Panel   Result Value Ref Range    Sodium 138 136 - 145 mmol/L    Potassium 4.1 3.5 - 5.0 mmol/L    Chloride 110 (H) 98 - 107 mmol/L    CO2 19 (L) 22 - 31 mmol/L    Anion Gap, Calculation 9 5 - 18 mmol/L    Glucose 181 (H) 70 - 125 mg/dL    BUN 55 (H) 8 - 22 mg/dL    Creatinine 0.76 0.60 - 1.10 mg/dL    GFR MDRD Af Amer >60 >60 mL/min/1.73m2    GFR MDRD Non Af Amer >60 >60 mL/min/1.73m2    Bilirubin, Total 0.9 0.0 - 1.0 mg/dL    Calcium 8.2 (L) 8.5 - 10.5 mg/dL    Protein, Total 5.4 (L) 6.0 - 8.0 g/dL    Albumin 1.9 (L) 3.5 - 5.0 g/dL    Alkaline Phosphatase 114 45 - 120 U/L    AST 21 0 - 40 U/L    ALT 16 0 - 45 U/L   HM2(CBC w/o Differential)   Result Value Ref Range    WBC 12.7 (H) 4.0 - 11.0 thou/uL    RBC 5.11 3.80 - 5.40 mill/uL    Hemoglobin 12.0 12.0 - 16.0 g/dL    Hematocrit 37.5 35.0 - 47.0 %    MCV 73 (L) 80 - 100 fL    MCH 23.5 (L) 27.0 - 34.0 pg    MCHC 32.0 32.0 - 36.0 g/dL    RDW 19.1 (H)  11.0 - 14.5 %    Platelets 144 140 - 440 thou/uL   POCT Glucose   Result Value Ref Range    Glucose,  mg/dL   Procalcitonin   Result Value Ref Range    Procalcitonin 0.83 (H) 0.00 - 0.49 ng/mL   POCT Glucose   Result Value Ref Range    Glucose,  mg/dL   POCT Glucose   Result Value Ref Range    Glucose,  mg/dL   Magnesium   Result Value Ref Range    Magnesium 1.6 (L) 1.8 - 2.6 mg/dL   HM2(CBC w/o Differential)   Result Value Ref Range    WBC 15.5 (H) 4.0 - 11.0 thou/uL    RBC 4.92 3.80 - 5.40 mill/uL    Hemoglobin 11.6 (L) 12.0 - 16.0 g/dL    Hematocrit 35.8 35.0 - 47.0 %    MCV 73 (L) 80 - 100 fL    MCH 23.6 (L) 27.0 - 34.0 pg    MCHC 32.4 32.0 - 36.0 g/dL    RDW 19.3 (H) 11.0 - 14.5 %    Platelets 147 140 - 440 thou/uL   Basic Metabolic Panel   Result Value Ref Range    Sodium 140 136 - 145 mmol/L    Potassium 3.3 (L) 3.5 - 5.0 mmol/L    Chloride 108 (H) 98 - 107 mmol/L    CO2 24 22 - 31 mmol/L    Anion Gap, Calculation 8 5 - 18 mmol/L    Glucose 166 (H) 70 - 125 mg/dL    Calcium 8.0 (L) 8.5 - 10.5 mg/dL    BUN 41 (H) 8 - 22 mg/dL    Creatinine 0.63 0.60 - 1.10 mg/dL    GFR MDRD Af Amer >60 >60 mL/min/1.73m2    GFR MDRD Non Af Amer >60 >60 mL/min/1.73m2   Phosphorus   Result Value Ref Range    Phosphorus 2.6 2.5 - 4.5 mg/dL   POCT Glucose   Result Value Ref Range    Glucose,  mg/dL   POCT Glucose   Result Value Ref Range    Glucose,  mg/dL   POCT Glucose   Result Value Ref Range    Glucose,  mg/dL   POCT Glucose   Result Value Ref Range    Glucose,  mg/dL   Potassium   Result Value Ref Range    Potassium 4.2 3.5 - 5.0 mmol/L   HM2(CBC w/o Differential)   Result Value Ref Range    WBC 15.6 (H) 4.0 - 11.0 thou/uL    RBC 4.96 3.80 - 5.40 mill/uL    Hemoglobin 11.7 (L) 12.0 - 16.0 g/dL    Hematocrit 36.4 35.0 - 47.0 %    MCV 73 (L) 80 - 100 fL    MCH 23.6 (L) 27.0 - 34.0 pg    MCHC 32.1 32.0 - 36.0 g/dL    RDW 19.6 (H) 11.0 - 14.5 %    Platelets 168 140 - 440 thou/uL    Basic Metabolic Panel   Result Value Ref Range    Sodium 140 136 - 145 mmol/L    Potassium 4.1 3.5 - 5.0 mmol/L    Chloride 109 (H) 98 - 107 mmol/L    CO2 24 22 - 31 mmol/L    Anion Gap, Calculation 7 5 - 18 mmol/L    Glucose 123 70 - 125 mg/dL    Calcium 8.1 (L) 8.5 - 10.5 mg/dL    BUN 34 (H) 8 - 22 mg/dL    Creatinine 0.53 (L) 0.60 - 1.10 mg/dL    GFR MDRD Af Amer >60 >60 mL/min/1.73m2    GFR MDRD Non Af Amer >60 >60 mL/min/1.73m2   Phosphorus Level > 2.4 no replacement required   Result Value Ref Range    Phosphorus 2.2 (L) 2.5 - 4.5 mg/dL   POCT Glucose   Result Value Ref Range    Glucose, POC 98 mg/dL   POCT Glucose   Result Value Ref Range    Glucose,  mg/dL   POCT Glucose   Result Value Ref Range    Glucose,  mg/dL   POCT Glucose   Result Value Ref Range    Glucose,  mg/dL   Magnesium   Result Value Ref Range    Magnesium 1.4 (L) 1.8 - 2.6 mg/dL   Renal Function Profile   Result Value Ref Range    Albumin 1.8 (L) 3.5 - 5.0 g/dL    Calcium 8.1 (L) 8.5 - 10.5 mg/dL    Phosphorus 2.0 (L) 2.5 - 4.5 mg/dL    Glucose 122 70 - 125 mg/dL    BUN 26 (H) 8 - 22 mg/dL    Creatinine 0.54 (L) 0.60 - 1.10 mg/dL    Sodium 139 136 - 145 mmol/L    Potassium 4.6 3.5 - 5.0 mmol/L    Chloride 109 (H) 98 - 107 mmol/L    CO2 24 22 - 31 mmol/L    Anion Gap, Calculation 6 5 - 18 mmol/L    GFR MDRD Af Amer >60 >60 mL/min/1.73m2    GFR MDRD Non Af Amer >60 >60 mL/min/1.73m2   HM1 (CBC with Diff)   Result Value Ref Range    WBC 16.8 (H) 4.0 - 11.0 thou/uL    RBC 5.06 3.80 - 5.40 mill/uL    Hemoglobin 11.6 (L) 12.0 - 16.0 g/dL    Hematocrit 37.2 35.0 - 47.0 %    MCV 74 (L) 80 - 100 fL    MCH 22.9 (L) 27.0 - 34.0 pg    MCHC 31.2 (L) 32.0 - 36.0 g/dL    RDW 19.7 (H) 11.0 - 14.5 %    Platelets 187 140 - 440 thou/uL    MPV  8.5 - 12.5 fL    Neutrophils % 78 (H) 50 - 70 %    Lymphocytes % 8 (L) 20 - 40 %    Monocytes % 13 (H) 2 - 10 %    Eosinophils % 1 0 - 6 %    Basophils % 0 0 - 2 %    Neutrophils Absolute 12.9 (H)  2.0 - 7.7 thou/uL    Lymphocytes Absolute 1.3 0.8 - 4.4 thou/uL    Monocytes Absolute 2.2 (H) 0.0 - 0.9 thou/uL    Eosinophils Absolute 0.2 0.0 - 0.4 thou/uL    Basophils Absolute 0.0 0.0 - 0.2 thou/uL   POCT Glucose   Result Value Ref Range    Glucose,  mg/dL   POCT Glucose   Result Value Ref Range    Glucose,  mg/dL   POCT Glucose   Result Value Ref Range    Glucose,  mg/dL   POCT Glucose   Result Value Ref Range    Glucose,  mg/dL   Magnesium   Result Value Ref Range    Magnesium 1.3 (L) 1.8 - 2.6 mg/dL   Renal Function Profile   Result Value Ref Range    Albumin 1.8 (L) 3.5 - 5.0 g/dL    Calcium 8.2 (L) 8.5 - 10.5 mg/dL    Phosphorus 2.6 2.5 - 4.5 mg/dL    Glucose 79 70 - 125 mg/dL    BUN 23 (H) 8 - 22 mg/dL    Creatinine 0.47 (L) 0.60 - 1.10 mg/dL    Sodium 136 136 - 145 mmol/L    Potassium 4.4 3.5 - 5.0 mmol/L    Chloride 103 98 - 107 mmol/L    CO2 25 22 - 31 mmol/L    Anion Gap, Calculation 8 5 - 18 mmol/L    GFR MDRD Af Amer >60 >60 mL/min/1.73m2    GFR MDRD Non Af Amer >60 >60 mL/min/1.73m2   HM1 (CBC with Diff)   Result Value Ref Range    WBC 17.1 (H) 4.0 - 11.0 thou/uL    RBC 4.94 3.80 - 5.40 mill/uL    Hemoglobin 11.6 (L) 12.0 - 16.0 g/dL    Hematocrit 35.9 35.0 - 47.0 %    MCV 73 (L) 80 - 100 fL    MCH 23.5 (L) 27.0 - 34.0 pg    MCHC 32.3 32.0 - 36.0 g/dL    RDW 19.4 (H) 11.0 - 14.5 %    Platelets 198 140 - 440 thou/uL    MPV  8.5 - 12.5 fL    Neutrophils % 78 (H) 50 - 70 %    Lymphocytes % 8 (L) 20 - 40 %    Monocytes % 13 (H) 2 - 10 %    Eosinophils % 1 0 - 6 %    Basophils % 0 0 - 2 %    Neutrophils Absolute 13.1 (H) 2.0 - 7.7 thou/uL    Lymphocytes Absolute 1.4 0.8 - 4.4 thou/uL    Monocytes Absolute 2.2 (H) 0.0 - 0.9 thou/uL    Eosinophils Absolute 0.2 0.0 - 0.4 thou/uL    Basophils Absolute 0.0 0.0 - 0.2 thou/uL   POCT Glucose   Result Value Ref Range    Glucose, POC 84 mg/dL   POCT Glucose   Result Value Ref Range    Glucose,  mg/dL   Procalcitonin   Result  Value Ref Range    Procalcitonin 0.15 0.00 - 0.49 ng/mL   POCT Glucose   Result Value Ref Range    Glucose,  mg/dL   ECG 12 lead with MUSE   Result Value Ref Range    SYSTOLIC BLOOD PRESSURE  mmHg    DIASTOLIC BLOOD PRESSURE  mmHg    VENTRICULAR RATE 55 BPM    ATRIAL RATE 326 BPM    P-R INTERVAL  ms    QRS DURATION 122 ms    Q-T INTERVAL 444 ms    QTC CALCULATION (BEZET) 424 ms    P Axis  degrees    R AXIS 75 degrees    T AXIS -69 degrees    MUSE DIAGNOSIS       Atrial fibrillation with slow ventricular response  Right bundle branch block  ST & T wave abnormality, consider inferolateral ischemia or digitalis effect  Abnormal ECG  When compared with ECG of 02-MAR-2018 05:49,  Vent. rate has decreased  BPM  ST now depressed in Lateral leads  Inverted T waves have replaced nonspecific T wave abnormality in Inferior leads  Confirmed by OPAL JOSE, LEA LOC:JN ([id number]) on 3/9/2018 10:13:11 AM     Troponin I   Result Value Ref Range    Troponin I 0.17 0.00 - 0.29 ng/mL   POCT Glucose   Result Value Ref Range    Glucose,  mg/dL   Urinalysis-UC if Indicated   Result Value Ref Range    Color, UA Straw Colorless, Yellow, Straw, Light Yellow    Clarity, UA Clear Clear    Glucose, UA Negative Negative    Bilirubin, UA Negative Negative    Ketones, UA Negative Negative    Specific Gravity, UA 1.005 1.001 - 1.030    Blood, UA Negative Negative    pH, UA 7.0 4.5 - 8.0    Protein, UA Negative Negative mg/dL    Urobilinogen, UA <2.0 E.U./dL <2.0 E.U./dL, 2.0 E.U./dL    Nitrite, UA Negative Negative    Leukocytes, UA Negative Negative   Renal Function Profile   Result Value Ref Range    Albumin 1.8 (L) 3.5 - 5.0 g/dL    Calcium 8.4 (L) 8.5 - 10.5 mg/dL    Phosphorus 2.9 2.5 - 4.5 mg/dL    Glucose 114 70 - 125 mg/dL    BUN 24 (H) 8 - 22 mg/dL    Creatinine 0.54 (L) 0.60 - 1.10 mg/dL    Sodium 135 (L) 136 - 145 mmol/L    Potassium 4.3 3.5 - 5.0 mmol/L    Chloride 102 98 - 107 mmol/L    CO2 25 22 - 31 mmol/L    Anion  Gap, Calculation 8 5 - 18 mmol/L    GFR MDRD Af Amer >60 >60 mL/min/1.73m2    GFR MDRD Non Af Amer >60 >60 mL/min/1.73m2   HM1 (CBC with Diff)   Result Value Ref Range    WBC 19.6 (H) 4.0 - 11.0 thou/uL    RBC 4.87 3.80 - 5.40 mill/uL    Hemoglobin 11.2 (L) 12.0 - 16.0 g/dL    Hematocrit 35.3 35.0 - 47.0 %    MCV 73 (L) 80 - 100 fL    MCH 23.0 (L) 27.0 - 34.0 pg    MCHC 31.7 (L) 32.0 - 36.0 g/dL    RDW 19.1 (H) 11.0 - 14.5 %    Platelets 216 140 - 440 thou/uL    MPV 10.8 8.5 - 12.5 fL    Neutrophils % 83 (H) 50 - 70 %    Lymphocytes % 5 (L) 20 - 40 %    Monocytes % 11 (H) 2 - 10 %    Eosinophils % 0 0 - 6 %    Basophils % 0 0 - 2 %    Neutrophils Absolute 16.2 (H) 2.0 - 7.7 thou/uL    Lymphocytes Absolute 1.0 0.8 - 4.4 thou/uL    Monocytes Absolute 2.2 (H) 0.0 - 0.9 thou/uL    Eosinophils Absolute 0.0 0.0 - 0.4 thou/uL    Basophils Absolute 0.0 0.0 - 0.2 thou/uL   POCT Glucose   Result Value Ref Range    Glucose,  mg/dL   POCT Glucose   Result Value Ref Range    Glucose,  mg/dL   POCT Glucose   Result Value Ref Range    Glucose,  mg/dL   POCT Glucose   Result Value Ref Range    Glucose,  mg/dL   Basic Metabolic Panel   Result Value Ref Range    Sodium 135 (L) 136 - 145 mmol/L    Potassium 4.5 3.5 - 5.0 mmol/L    Chloride 103 98 - 107 mmol/L    CO2 26 22 - 31 mmol/L    Anion Gap, Calculation 6 5 - 18 mmol/L    Glucose 77 70 - 125 mg/dL    Calcium 8.2 (L) 8.5 - 10.5 mg/dL    BUN 20 8 - 22 mg/dL    Creatinine 0.52 (L) 0.60 - 1.10 mg/dL    GFR MDRD Af Amer >60 >60 mL/min/1.73m2    GFR MDRD Non Af Amer >60 >60 mL/min/1.73m2   Magnesium   Result Value Ref Range    Magnesium 1.7 (L) 1.8 - 2.6 mg/dL   Phosphorus Level > 2.4 no replacement required   Result Value Ref Range    Phosphorus 2.3 (L) 2.5 - 4.5 mg/dL   HM1 (CBC with Diff)   Result Value Ref Range    WBC 20.4 (H) 4.0 - 11.0 thou/uL    RBC 4.79 3.80 - 5.40 mill/uL    Hemoglobin 11.2 (L) 12.0 - 16.0 g/dL    Hematocrit 35.7 35.0 - 47.0 %     MCV 75 (L) 80 - 100 fL    MCH 23.4 (L) 27.0 - 34.0 pg    MCHC 31.4 (L) 32.0 - 36.0 g/dL    RDW 20.0 (H) 11.0 - 14.5 %    Platelets 259 140 - 440 thou/uL    MPV 10.6 8.5 - 12.5 fL    Neutrophils % 83 (H) 50 - 70 %    Lymphocytes % 6 (L) 20 - 40 %    Monocytes % 11 (H) 2 - 10 %    Eosinophils % 0 0 - 6 %    Basophils % 0 0 - 2 %    Neutrophils Absolute 16.8 (H) 2.0 - 7.7 thou/uL    Lymphocytes Absolute 1.2 0.8 - 4.4 thou/uL    Monocytes Absolute 2.2 (H) 0.0 - 0.9 thou/uL    Eosinophils Absolute 0.0 0.0 - 0.4 thou/uL    Basophils Absolute 0.0 0.0 - 0.2 thou/uL   POCT Glucose   Result Value Ref Range    Glucose, POC 98 mg/dL   POCT Glucose   Result Value Ref Range    Glucose,  mg/dL     Xr Chest 1 View Portable    Result Date: 3/5/2018  XR CHEST 1 VIEW PORTABLE 3/5/2018 9:13 AM INDICATION: L effusion COMPARISON: 03/03/2018 FINDINGS: There is increased opacity in the left upper lobe abutting the fissure. Small to moderate left pleural effusion appears stable. There is mild pulmonary vascular congestion and stable moderate cardiomegaly.    Xr Chest 1 View Portable    Result Date: 3/3/2018  XR CHEST 1 VIEW PORTABLE 3/3/2018 3:33 AM INDICATION: sob COMPARISON: CT chest 03/02/2018. FINDINGS: Heart is enlarged. Venous congestion. Small left basilar pleural effusion. Interval improvement with decrease in left effusion. Left perihilar and left lower lung atelectasis or infiltrate. Mild venous congestion. Trace right basilar effusion. No pneumothorax.     Xr Chest 1 View Portable    Result Date: 3/2/2018  XR CHEST 1 VIEW PORTABLE 3/2/2018 6:10 AM INDICATION: dyspnea COMPARISON: None. FINDINGS: Heart is markedly enlarged. Venous congestion. Opacification left mid and left hemithorax due to effusion, edema and or consolidation. No pneumothorax. Diffuse demineralization. Monitor electrodes.    Xr Chest 2 Views    Result Date: 3/8/2018  XR CHEST 2 VIEWS 3/8/2018 8:17 AM INDICATION: f/u resp distress COMPARISON: None.  FINDINGS: No focal airspace opacities in the left upper lobe, right lower lobe and left lower lobe. There are small bilateral pleural effusions. Findings are most consistent with bilateral pneumonia. Follow-up after treatment is recommended to ensure clearing. There is stable mild cardiac enlargement. Pulmonary vascularity is normal. No pneumothorax. NOTE: ABNORMAL REPORT THE DICTATION ABOVE DESCRIBES AN ABNORMALITY FOR WHICH FOLLOW-UP IS NEEDED.     Cta Chest Pe Run    Result Date: 3/2/2018  CTA CHEST PE RUN 3/2/2018 7:45 AM INDICATION: Dyspnea, cardiac origin suspected dyspnea. TECHNIQUE: Helical acquisition through the chest was performed during the arterial phase of contrast enhancement using IV contrast. 2D and 3D reconstructions were performed by the CT technologist. Dose reduction techniques were used. IV CONTRAST: Iohexol (Omni) 100 mL COMPARISON: None. FINDINGS: ANGIOGRAM CHEST: There is a large filling defect in the left main pulmonary artery extending into the left upper lobe artery consistent with large pulmonary embolus differential diagnosis would include neoplasm invading the pulmonary artery. There is no thoracic aortic aneurysm. There is additional partially occlusive filling defect in the left lower lobe pulmonary artery. There is a filling defect in a right lower lobe segmental artery on image 2/2 of series 4. Findings are consistent with large pulmonary embolus on the left and small pulmonary embolus on the right. RV/LV RATIO: 1.4 which is abnormally elevated. LUNGS AND PLEURA: There is a large partially loculated left pleural effusion. There is masslike opacity in the posterior left lower lobe there is a 1.9 x 3 cm on image 61 of series 5. I cannot exclude a mass versus atelectasis. There is mild atelectasis in the right upper lobe the right lung is otherwise clear. There is atelectasis in the left lower lobe. MEDIASTINUM: The atelectasis or mass in the left upper lung extends to the left hilum  no discrete lymphadenopathy is identified. No right hilar lymphadenopathy. No pericardial effusion. There is an elevated RV LV ratio suggesting possible right heart strain. The main pulmonary artery measures 3.9 cm in diameter and the aortic root also measures 3.9 cm. LIMITED UPPER ABDOMEN: There is reflux of contrast into the inferior vena cava and hepatic veins which can be seen with elevated right heart pressure. MUSCULOSKELETAL: Negative. Findings were called to Dr. Crabtree at 800 hours on 03/02/2018.    CONCLUSION: 1.  Large filling defect in the left main pulmonary artery and left upper lobe artery was smaller filling defects in left lower lobe and right lower lobe segmental arteries consistent with a large left-sided pulmonary embolus in small right-sided pulmonary embolus. Less likely would be a mass invading the left pulmonary artery catheter and bilateral filling defects. 2.  Large partially loculated left pleural effusion. There is atelectasis or mass in the left upper lobe and atelectasis in the left lower lobe. I cannot exclude an underlying left lung cancer. 3.  Cardiomegaly with signs of possible right heart strain. NOTE: ABNORMAL REPORT THE DICTATION ABOVE DESCRIBES AN ABNORMALITY FOR WHICH FOLLOW-UP IS NEEDED.     Us Venous Legs Bilateral    Result Date: 3/2/2018  US VENOUS LEGS BILATERAL 3/2/2018 3:22 PM INDICATION: Swelling of lower extremities and pulmonary embolus. Evaluate DVT. TECHNIQUE: Routine exam with compression, augmentation, and duplex utilizing 2D gray-scale imaging, Doppler interrogation with color-flow and spectral waveform analysis. COMPARISON: None. FINDINGS: The common femoral, femoral, popliteal, and segmentally visualized calf veins were evaluated. Nonocclusive thrombus in the proximal femoral vein and distal femoral vein. Otherwise, no other definitive DVT. Limited evaluation of the peroneal veins. Left leg veins are negative for deep venous thrombosis. Limited evaluation of  the peroneal veins. No popliteal cysts.     CONCLUSION: 1.  Mild nonocclusive right femoral vein DVT. 2.  No left lower extremity DVT. 3.  Limited evaluation of the peroneal veins bilaterally. Findings verbally communicated to patient's nurse, Esperanza, at 3:30 PM on 03/02/2018.     Us Thoracentesis    Result Date: 3/2/2018  1. LEFT THORACENTESIS 2. ULTRASOUND GUIDANCE 3/2/2018 3:16 PM INDICATION: Pleural effusion. PROCEDURE: Informed consent obtained. Time out performed. The chest was prepped and draped in sterile fashion. 10 mL of 1 % lidocaine was infused into the local soft tissues. Under direct ultrasound guidance, a 5 [place] [employer] catheter system was placed into the pleural effusion. 600 milliliters of clear yellow fluid were removed and sent to lab. Patient tolerated procedure well. RADIOLOGIC SUPERVISION AND INTERPRETATION: Left pleural effusion seen. ULTRASOUND GUIDANCE: Images demonstrate the pleural effusion. Catheter seen in good position.     CONCLUSION: Status post left ultrasound-guided thoracentesis.    Assessment/Plan:    Acute large left-sided pulmonary embolism with a small right-sided pulmonary embolism  Right lower extremity DVT  Seen by pulmonary and was on a heparin drip currently transition over to Xarelto  Duration to be determined by pulmonary she will follow-up with them as an outpatient    Acute systolic congestive heart failure with echo showing biventricular dysfunction EF of 20%  She was diuresed with IV Lasix currently discharged on oral Lasix continue to monitor weights closely    Loculated pleural effusion left-sided status post thoracentesis  Workup reveals that it is a transudate most likely with untreated congestive heart failure  Incision is healing well.  Studies so far negative for any malignancy.    Atrial fibrillation new onset  Seen by cardiology and discharged on Cardizem metoprolol as well as anticoagulation  Continue to monitor heart rates    History of Graves' disease  patient was admitted with potential thyroid storm and was given steroids along with potassium iodide and methimazole.  Her T4 levels were high even though her T3 were normal    Fever unclear etiology with increasing leukocytosis seen by infectious disease and empirically given Levaquin for 5 days  Hypoxic respiratory failure currently on oxygen wean as tolerated  Left upper lobe mass most likely felt to be pneumonia  Chronic diarrhea most likely from untreated thyrotoxicosis  Ongoing nicotine addiction  progressive weakness-she has been discharged to the TCU with recommendation that she closely follow with her multiple specialists including endocrinology pulmonology and cardiology.  In addition she needs a chest CT with contrast on 3/16/18  Total time spent was 45 minutes, more than half of it was in face-to-face counseling regarding disease state, treatment, side effects, documentation, review of clinical data and coordination of care    Electronically signed by: SILVER Dickey  This progress note was completed using Dragon software and there may be grammatical errors.

## 2021-06-17 NOTE — ANESTHESIA PREPROCEDURE EVALUATION
Anesthesia Evaluation      Patient summary reviewed   No history of anesthetic complications     Airway   Mallampati: II  Neck ROM: full   Pulmonary - normal exam    breath sounds clear to auscultation  (+) a smoker (Former, 30 pack years)  (-) shortness of breath    ROS comment: PE hx  Pulmonary infarct    Pt feels much better at this time, denies SOB                           Cardiovascular   (+) dysrhythmias (a.fib, RBBB), angina, CHF, ,     (-) murmur  ECG reviewed  Rhythm: irregular  Rate: abnormal,    no murmur   ROS comment: DVT, PE     Neuro/Psych - negative ROS     Endo/Other    (+) hyperthyroidism (with thyrotoxicosis in 3/2018.),      GI/Hepatic/Renal - negative ROS      Other findings: 3/19/18 Echo  Summary          Left Ventricle: Normal left ventricular size.The calculated left ventricular ejection fraction is 52%. Mild concentric hypertrophy noted.    Right Ventricle: The right ventricle is mildly dilated. The systolic function is mildly reduced. TAPSE is abnormal, which is consistent with abnormal right ventricular systolic function.    Tricuspid Valve: Mild to moderate tricuspid valve regurgitation. The estimated systolic pulmonary artery pressure is 47+ right atrial pressure mmhg.      Compared to the echocardiogram from 3/13/2018, the left ventricular systolic function is improved significantly. The right ventricular systolic function is also improved.  This is a limited study so full Doppler studies not performed.      03/02/18 0745 CTA Chest PE Run  CONCLUSION:  1. Large filling defect in the left main pulmonary artery and left upper lobe artery was smaller filling defects in left lower lobe and right lower lobe segmental arteries consistent with a large left-sided pulmonary embolus in small right-sided   pulmonary embolus. Less likely would be a mass invading the left pulmonary artery catheter and bilateral filling defects.  2. Large partially loculated left pleural effusion. There is  atelectasis or mass in the left upper lobe and atelectasis in the left lower lobe. I cannot exclude an underlying left lung cancer.  3. Cardiomegaly with signs of possible right heart strain.                 Dental    (+) caps                       Anesthesia Plan  Planned anesthetic: general mask and total IV anesthesia    ASA 3   Induction: intravenous   Anesthetic plan and risks discussed with: patient    Post-op plan: routine recovery

## 2021-06-17 NOTE — PROGRESS NOTES
Pt seen in clinic 4/2 CV ordered no device per Sidra  Pt on Xarelto go after 4/23  Pt is aware and has my direct number for contact

## 2021-06-17 NOTE — PROGRESS NOTES
[facility]      Facility:    [facility] [[id number]]    Code Status: FULL CODE   [facility] 3/2 -3/10/18  [facility]  3/10    - 3/12/18  [facility] 3/12  - 3/22/2018          Chief Complaint/Reason for Visit:  Chief Complaint   Patient presents with     H & P     pneumonia/dyspnea       HPI:   Kallie is a 58 y.o. female who has had multiple recent medical issues.    She was admitted 3/2 through 3/10/18 at [facility] with a 2 month history of increased dyspnea, and was diagnosed with  an acute submassive pulmonary embolism, and right leg DVT.  She was not treated with lytics due to a possibility of a left lung mass being a malignancy.  She had a fever and leukocytosis that was felt to be from pulmonary infarct.  At that time she also had new atrial fibrillation with RVR, acute systolic heart failure with an ejection fraction of 20%, a loculated left pleural effusion status post thoracentesis ( negative for malignancy), acute hypoxemic respiratory failure, history of untreated Graves' disease with likely thyrotoxicosis (T3 was normal, T4 was high).    She is at [facility] for 3 days, but developed fever, elevated white count and hypoxia on room air.  CT of the chest  showed extensive consolidation.  She was treated from her pneumonia with 7 days of vancomycin and Zosyn, with follow-up 14 day course of doxycycline per pulmonary consult.  He was still in A. fib with uncontrolled rate, digoxin and metoprolol were increased.  There is consideration for possible ablation in the future.  Repeat echo on 3-13-18 showed ejection fraction up to 35%, on 3/19/18 the EF had an increased to 52%.    Free T4 was checked and was within normal limits, she needs follow-up with endocrinology.    Discharge from [facility], she was transferred to [facility] for rehabilitation and strengthening.    Past Medical  History:  Past Medical History:   Diagnosis Date     Acute respiratory failure      Atrial fibrillation      Atrial fibrillation with RVR      Loculated pleural effusion      Pulmonary embolism      Systolic CHF      Thyrotoxicosis with thyrotoxic crisis            Surgical History:  Past Surgical History:   Procedure Laterality Date     PICC  3/13/2018            Family History:   No family history on file.    Social History:    Social History     Social History     Marital status:      Spouse name: N/A     Number of children: N/A     Years of education: N/A     Social History Main Topics     Smoking status: Former Smoker     Packs/day: 1.00     Years: 30.00     Types: Cigarettes     Quit date: 2/23/2018     Smokeless tobacco: Never Used     Alcohol use No     Drug use: No     Sexual activity: Not on file     Other Topics Concern     Not on file     Social History Narrative          Review of Systems  Much less short of breath, she is having more energy.  There a lot of steps in her home, so she will need to work on those  Remainder of the comprehensive review of systems is negative    Vitals:    03/22/18 0900   BP: (!) 89/63   Pulse: 69   Resp: 18   Temp: 97.6  F (36.4  C)   SpO2: 97%       Physical Exam   Constitutional: She is oriented to person, place, and time. No distress.   Thin, fatigued, middle-aged  female   HENT:   Nose: Nose normal.   Mouth/Throat: Oropharynx is clear and moist.   Multiple caries in her front teeth   Eyes: Conjunctivae and EOM are normal. No scleral icterus.   No visible exopthalmos   Cardiovascular: Exam reveals no friction rub.    No murmur heard.  Irregularly irregular   Pulmonary/Chest: Breath sounds normal. No respiratory distress. She has no wheezes.   Decreased left base   Abdominal: Soft. Bowel sounds are normal. She exhibits no distension. There is no tenderness.   Musculoskeletal: She exhibits no edema or deformity.   Low muscle bulk   Lymphadenopathy:     She  has no cervical adenopathy.   Neurological: She is alert and oriented to person, place, and time. Coordination abnormal.   Skin: Skin is warm and dry. No rash noted. No erythema.   Psychiatric: She has a normal mood and affect. Her behavior is normal.       Medication List:  Current Outpatient Prescriptions   Medication Sig     dextromethorphan (DELSYM 12 HOUR) 30 mg/5 mL liquid Take 60 mg by mouth 2 (two) times a day as needed for cough.     digoxin (LANOXIN) 250 mcg tablet Take 1 tablet (250 mcg total) by mouth daily with supper.     ferrous sulfate 325 (65 FE) MG tablet Take 1 tablet (325 mg total) by mouth 2 (two) times a day with meals.     furosemide (LASIX) 20 MG tablet Take 1 tablet (20 mg total) by mouth daily.     methIMAzole (TAPAZOLE) 10 MG tablet Take 1 tablet (10 mg total) by mouth 3 (three) times a day.     metoprolol tartrate (LOPRESSOR) 100 MG tablet Take 1 tablet (100 mg total) by mouth 2 (two) times a day.     nicotine (NICODERM CQ) 14 mg/24 hr Place 1 patch on the skin daily.     omeprazole (PRILOSEC) 20 MG capsule Take 1 capsule (20 mg total) by mouth daily before breakfast for 14 days.     rivaroxaban (XARELTO) 20 mg Tab Take 1 tablet (20 mg total) by mouth daily with supper.     rivaroxaban 15 mg Tab Take 1 tablet (15 mg total) by mouth 2 (two) times a day with meals. 15mg BID for 3  days followed by 20mg daily with food indefinitely       Labs:  Ref Range & Units    3/21/18   WBC 4.0 - 11.0 thou/uL 9.6   Hemoglobin 12.0 - 16.0 g/dL 10.9 (L)   MCV 80 - 100 fL 75 (L)   RDW 11.0 - 14.5 % 22.2 (H)   Platelets 140 - 440 thou/uL 543 (H)   Neutrophils % 50 - 70 % 77 (H)   Lymphocytes % 20 - 40 % 11 (L)     Ref Range & Units    3/21/18     Sodium 136 - 145 mmol/L 135 (L)   Potassium 3.5 - 5.0 mmol/L 4.1   Chloride 98 - 107 mmol/L 105   CO2 22 - 31 mmol/L 23   Anion Gap, Calculation 5 - 18 mmol/L 7   Glucose 70 - 125 mg/dL 95   Calcium 8.5 - 10.5 mg/dL 9.1   BUN 8 - 22 mg/dL 14   Creatinine 0.60 -  1.10 mg/dL 0.57 (L)   GFR MDRD Non Af Amer >60 mL/min/1.73m2 >60     Normal magnesium, pro calcitonin, Influenza screen    Assessment:    ICD-10-CM    1. Pneumonia of left upper lobe due to infectious organism J18.1    2. History of pulmonary embolism Z86.711    3. History of DVT (deep vein thrombosis) Z86.718    4. Pulmonary infarct I26.99    5. LV dysfunction I51.9    6. History of Graves' disease: untreated until March 2018 Z86.39    7. Persistent atrial fibrillation I48.1    8. Tobacco user Z72.0      Plan:  Work on therapies for strengthening, particularly stairs  Continue anticoagulation for both PE/DVT/atrial fibrillation  Recommendation for tobacco cessation  Possible atrial fibrillation ablation procedure  LV function improving, does not have signs and symptoms at this time of failure  Continue methimazole for Graves' disease, outpatient follow-up with endocrinology    Time 55 minutes, greater than 50% face-to-face with patient and her  reviewing her recent hospitalization and data, discussing smoking, her other disease states and treatments.  Electronically signed by: Bailee Levy MD

## 2021-06-17 NOTE — ANESTHESIA CARE TRANSFER NOTE
Last vitals:   Vitals:    05/04/18 1149   Pulse: (!) 101   Resp: 16   Temp:    SpO2: 98%     Patient's level of consciousness is drowsy  Spontaneous respirations: yes  Maintains airway independently: yes  Dentition unchanged: yes  Oropharynx: oropharynx clear of all foreign objects    QCDR Measures:  ASA# 20 - Surgical Safety Checklist: WHO surgical safety checklist completed prior to induction  PQRS# 430 - Adult PONV Prevention: NA - Not adult patient, not GA or 3 or more risk factors NOT present  ASA# 8 - Peds PONV Prevention: NA - Not pediatric patient, not GA or 2 or more risk factors NOT present  PQRS# 424 - Dinah-op Temp Management: NA - MAC anesthesia or case < 60 minutes  PQRS# 426 - PACU Transfer Protocol:NA - Patient did not go to PACU  ASA# 14 - Acute Post-op Pain: NA - Patient under age 10y or did not go to PACU

## 2021-06-17 NOTE — PROGRESS NOTES
1960  Home:[phone] (home) Cell:[phone] (mobile)  Emergency Contact: Carlitos Barker [phone]    +++Important patient information for [facility]/Cath Lab staff : PT IS ON XARELTO, +++    [facility] EP Cath Lab Procedure Order     Cardioversion:    Cardioversion     P IS ON XARELTO AND NO DOSES MISSED    Diagnosis:  AF  Anticipated Case Duration:  Standard  Scheduling Needs/Timeframe:  PT IS SET FOR FRIDAY 5/4/2018 AT 12 00 WITH SIDRA BALTAZAR CNP    Current Device: None None  Device Company/Device Rep Needed for Procedure: None    Anesthesia:  General-CV Only  Research Protocol:  No    [facility] EP Cath Lab Prep   Ordering Provider: Sidra Baltazar NP  Ordering Date: 4/2/2018  Orders Status: Intial order placed and Order set placed  EP NC Contact: Taryn Dixon LPN    H&P:  Compled by DR ALLEN on 4/27/2018 if scheduled within 30 days, pt to schedule with PMD if procedure outside of this timeframe  PCP: Michele Guthrie MD, [phone]    Pre-op Labs: N/A for procedure    Medical Records Pertinent for Procedure:  N/A    Patient Education:    PT HAS A  FOR PROCEDURE  PT INSTRUCTED TO HOLD ANY VITAMINS, MINERALS, CALCIUM, IRON OR SUPPLEMENTS THE MORNING OF CV  PT IS ON XARELTO AND NO MISSED DOSES  PT INSTRUCTED TO STOP DIGOXIN THE DAY BEFORE CV  PT INSTRUCTED TO DECREASE METOPROLOL TO 50 MG THE MORNING OF CV  PT INSTRUCTED TO BATHE OR SHOWER BEFORE COMING IN  PT INSTRUCTED TO LEAVE JEWELRY AT HOME  PT WILL HAVE FOLLOW UP APPT WITH SIDRA IN 4 WEEKS AND  HAS BEEN NOTIFIED          Teach with Patient: Completed via phone on 5/2/2018    Risks Reviewed:     Cardioversion    >90% acute success rate, <10% failure to convert or   reverts shortly after cardioversion.    <1% embolic event of (CVA, pulmonary embolism, or   other site).    75% risk for superficial burn.  Risks associated with general anesthesia will be addressed by the Anesthesiology Department    Consent: Will be obtained in [facility] day of  procedure    Pre-Procedure Instructions that were Reviewed with Patient:  NPO after midnight, Remove all jewelry prior to coming in for procedure, Shower prior to arrival, Transportation arrangements needed s/p procedure, Post-procedure follow up process and Sedation plan/orders    Pre-Procedure Medication Instructions:  Instructions given to pt regarding anticoagulants: Xarelto- instructed to continue anticoagulation uninterrupted through their procedure  Instructions given to pt regarding antiarrhythmic medication: Beta Blocker; Pt instructed to continue medication prior to procedure  Instructions for medication, other than anticoagulants/antiarrhythmics listed above, given to pt: to take all morning medications with small sips of water, with the exception of OTC supplements and MVI    No Known Allergies    Current Outpatient Prescriptions:      dextromethorphan (DELSYM 12 HOUR) 30 mg/5 mL liquid, Take 60 mg by mouth 2 (two) times a day as needed for cough., Disp: , Rfl:      digoxin (LANOXIN) 125 mcg tablet, Take 1 tablet (125 mcg total) by mouth daily with supper., Disp: 90 tablet, Rfl: 3     escitalopram oxalate (LEXAPRO) 10 MG tablet, Take 10 mg by mouth daily., Disp: , Rfl:      ferrous sulfate 325 (65 FE) MG tablet, Take 1 tablet by mouth daily with breakfast., Disp: , Rfl:      furosemide (LASIX) 20 MG tablet, Take 1 tablet (20 mg total) by mouth daily., Disp: 30 tablet, Rfl: 3     methIMAzole (TAPAZOLE) 10 MG tablet, Take 1 tablet (10 mg total) by mouth 3 (three) times a day., Disp: 270 tablet, Rfl: 1     metoprolol tartrate (LOPRESSOR) 100 MG tablet, Take 1 tablet (100 mg total) by mouth 2 (two) times a day., Disp: 60 tablet, Rfl: 6     multivitamin therapeutic tablet, Take 1 tablet by mouth daily., Disp: , Rfl:      rivaroxaban (XARELTO) 20 mg Tab, Take 1 tablet (20 mg total) by mouth daily with supper., Disp: 30 tablet, Rfl: 6     rivaroxaban 15 mg Tab, Take 1 tablet (15 mg total) by mouth 2 (two) times  a day with meals. 15mg BID for 3  days followed by 20mg daily with food indefinitely, Disp: 30 tablet, Rfl: 0    Documentation Date:5/2/2018 12:02 PM  Iker Dixon LPN

## 2021-06-17 NOTE — PROGRESS NOTES
[facility]--Electrophysiology    Assessment/Plan:      Problem List Items Addressed This Visit     Persistent atrial fibrillation - Primary    Relevant Medications    rivaroxaban (XARELTO) 20 mg Tab    furosemide (LASIX) 20 MG tablet    digoxin (LANOXIN) 250 mcg tablet    metoprolol tartrate (LOPRESSOR) 100 MG tablet    Other Relevant Orders    EP Cardioversion External    Dilated cardiomyopathy    Relevant Medications    furosemide (LASIX) 20 MG tablet    digoxin (LANOXIN) 250 mcg tablet    metoprolol tartrate (LOPRESSOR) 100 MG tablet    Acute on chronic systolic congestive heart failure    Relevant Medications    furosemide (LASIX) 20 MG tablet    digoxin (LANOXIN) 250 mcg tablet    metoprolol tartrate (LOPRESSOR) 100 MG tablet      Other Visit Diagnoses     Atrial fibrillation with RVR        Relevant Medications    furosemide (LASIX) 20 MG tablet    digoxin (LANOXIN) 250 mcg tablet    metoprolol tartrate (LOPRESSOR) 100 MG tablet        1.  Persistent atrial fibrillation: Newly diagnosed ORION cano with RVR 3/2/2018 during a thyrotoxic crisis.  Ventricular rates appear to be slightly elevated on exam today, but she has not been taking metoprolol as she was not given a prescription upon discharge TCU.  Prescription for metoprolol was sent to her pharmacy.  She was instructed to restart this, but check her blood pressure 1-2 hours after taking it and to call if SBP is consistently < 95.  We had a lengthy discussion of the physiology and natural progression of atrial fibrillation as well as the likelihood that this is due to her hyperthyroid.  Recommend cardioversion and maintenance of sinus rhythm.  Discussed the cardioversion procedure, preprocedure instructions, risks, expected recovery, and follow-up.  She was given some information to review at home.  Her questions were answered to her satisfaction and she wishes to proceed.    She was reassured that atrial fibrillation is not life-threatening, but  carries an increased risk for stroke.  She has a [id number] score of 4 for female gender, acute systolic heart failure, and PE.  She has just finished taking the Xarelto 15 mg twice daily and will start Xarelto 20 mg daily today.  We discussed the importance of taking this with a full meal at the time time every day and not missing any doses.  Agree with previous recommendation for lifelong anticoagulation due to submassive PE and A. fib.  Plan for cardioversion in 4 weeks.    Atrial fibrillation does not present any increased risk in undergoing cataract surgery.  Do not recommend any procedures requiring holding anticoagulation at this time.    2.  Heart failure with reduced ejection fraction: Cardiomyopathy was likely tachycardia mediated.  Left ventricular systolic performance has normalized.  She appears well compensated with no sign of acute fluid overload on exam today.  Decrease furosemide to 20 mg daily.  She was instructed to weigh herself daily and take an extra 20 mg of furosemide for a weight increase of 2-3 pounds in 2 days or 5 pounds in 1 week.    Follow-up 3-4 weeks after cardioversion.    Subjective:      Kallie Barker, a very pleasant 58-year-old woman, comes in today for EP evaluation of atrial fibrillation.  She has a history of Graves' disease initially diagnosed in 2011 but untreated until she had thyrotoxicosis with thyrotoxic crisis in early March 2017.  She was also noted to have atrial fibrillation with rapid ventricular response, severe left ventricular systolic dysfunction with an EF of 20% and right lower extremity DVT with submassive pulmonary embolism.  She was started on methimazole for hyperthyroidism, ventricular rates controlled with metoprolol and digoxin, and started on Xarelto for both PE and A. fib.  She was hospitalized again on 3/12/2018 for pneumonia during which echocardiogram showed near normalization of left ventricular systolic performance.    Kallie states that  she has occasional palpitations, mild dyspnea on exertion, and weakness but is getting significantly stronger.  She denies chest discomfort, sustained palpitations, shortness of breath at rest, paroxysmal nocturnal dyspnea, orthopnea, lightheadedness, dizziness, pre-syncope, or syncope.    Medical, surgical, family, social history, and medications were reviewed and updated as necessary.    Patient Active Problem List   Diagnosis     Persistent atrial fibrillation     Loculated pleural effusion     PE     Thyrotoxicosis with thyrotoxic crisis, unspecified thyrotoxicosis type     Tobacco user     Pneumonia     Pulmonary infarct     History of pulmonary embolism     History of Graves' disease     Dilated cardiomyopathy     Acute on chronic systolic congestive heart failure     Leukocytosis     History of DVT (deep vein thrombosis)       Past Medical History:   Diagnosis Date     Acute respiratory failure      Atrial fibrillation      Atrial fibrillation with RVR      Loculated pleural effusion      Pulmonary embolism      Systolic CHF      Thyrotoxicosis with thyrotoxic crisis        Past Surgical History:   Procedure Laterality Date     PICC  3/13/2018            No Known Allergies    Current Outpatient Prescriptions   Medication Sig Dispense Refill     digoxin (LANOXIN) 250 mcg tablet Take 1 tablet (250 mcg total) by mouth daily with supper. 30 tablet 3     ferrous sulfate 325 (65 FE) MG tablet Take 1 tablet (325 mg total) by mouth 2 (two) times a day with meals.  0     furosemide (LASIX) 20 MG tablet Take 1 tablet (20 mg total) by mouth daily. 30 tablet 3     metoprolol tartrate (LOPRESSOR) 100 MG tablet Take 1 tablet (100 mg total) by mouth 2 (two) times a day. 60 tablet 6     nicotine (NICODERM CQ) 14 mg/24 hr Place 1 patch on the skin daily. 28 patch 0     omeprazole (PRILOSEC) 20 MG capsule Take 1 capsule (20 mg total) by mouth daily before breakfast for 14 days. 14 capsule 0     rivaroxaban 15 mg Tab Take 1  "tablet (15 mg total) by mouth 2 (two) times a day with meals. 15mg BID for 3  days followed by 20mg daily with food indefinitely 30 tablet 0     dextromethorphan (DELSYM 12 HOUR) 30 mg/5 mL liquid Take 60 mg by mouth 2 (two) times a day as needed for cough.       methIMAzole (TAPAZOLE) 10 MG tablet Take 1 tablet (10 mg total) by mouth 3 (three) times a day. 270 tablet 1     rivaroxaban (XARELTO) 20 mg Tab Take 1 tablet (20 mg total) by mouth daily with supper. 30 tablet 6     No current facility-administered medications for this visit.        History reviewed. No pertinent family history.    Social History   Substance Use Topics     Smoking status: Former Smoker     Packs/day: 1.00     Years: 30.00     Types: Cigarettes     Quit date: 2/23/2018     Smokeless tobacco: Never Used     Alcohol use No       Review of Systems:   General: Night Sweats, Weight Loss  Eyes: Visual Distubance  Ears/Nose/Throat: Nosebleeds  Lungs: Shortness of Breath  Heart: Shortness of Breath with activity, Irregular Heartbeat  Stomach: WNL  Bladder: WNL  Muscle/Joints: WNL  Skin: WNL  Nervous System: WNL  Mental Health: WNL     Blood: Easy Bleeding      Objective:    BP 98/64 (Patient Site: Left Arm, Patient Position: Sitting, Cuff Size: Adult Regular)  Pulse 80  Resp 18  Ht 5' 4\" (1.626 m)  Wt 115 lb (52.2 kg)  BMI 19.74 kg/m2    Physical Exam  General Appearance:  Alert, well-appearing, in no acute distress.     HEENT:  Atraumatic, normocephalic; no scleral icterus, EOM intact; the mucous membranes were pink and moist.   Chest: Chest symmetric, spine straight.     Lungs:   Respirations unlabored; the lungs are clear to auscultation.   Cardiovascular:   Auscultation reveals normal first and second heart sounds with no murmurs, rubs, or gallops.  Irregularly irregular rate and rhythm.  Ventricular rate intermittently elevated.  No JVD.  Radial and posterior tibial pulses are intact.    Abdomen:  Soft, nontender, nondistended, bowel " sounds present.     Extremities: No cyanosis, clubbing, or edema.   Musculoskeletal:  Moves all extremities.  Ambulating with a cane.   Skin: Warm, dry, intact.   Neurologic: Mood and affect are appropriate. Oriented to person, place, time, and situation.       Cardiographics (personally reviewed)  ECG 3/8/2018 shows atrial fibrillation with controlled ventricular response at 55 bpm.  ECG: 3/2/2018 shows atrial fibrillation with rapid ventricular response at 185 bpm.    Echocardiogram: Done 3/19/2018    Left Ventricle: Normal left ventricular size.The calculated left ventricular ejection fraction is 52%. Mild concentric hypertrophy noted.    Right Ventricle: The right ventricle is mildly dilated. The systolic function is mildly reduced. TAPSE is abnormal, which is consistent with abnormal right ventricular systolic function.    Tricuspid Valve: Mild to moderate tricuspid valve regurgitation. The estimated systolic pulmonary artery pressure is 47+ right atrial pressure mmhg.      Compared to the echocardiogram from 3/13/2018, the left ventricular systolic function is improved significantly.  The right ventricular systolic function is also improved.  This is a limited study so full Doppler studies not performed.    Lab Review   Lab Results   Component Value Date    CREATININE 0.57 (L) 03/21/2018    BUN 14 03/21/2018     (L) 03/21/2018    K 4.1 03/22/2018     03/21/2018    CO2 23 03/21/2018     Lab Results   Component Value Date    TSH <0.01 (L) 03/02/2018     Lab Results   Component Value Date    WBC 9.6 03/21/2018    HGB 10.9 (L) 03/21/2018    HCT 35.3 03/21/2018    MCV 75 (L) 03/21/2018     (H) 03/21/2018         Greater than 60 minutes were spent face to face in this visit with more than 50% of the time discussing diagnoses as listed above, counseling, and coordination of care.      Sidra Baltazar, [facility], Electrophysiology  [phone]    This note has been dictated using  voice recognition software. Any grammatical, typographical, or context distortions are unintentional and inherent to the software.

## 2021-06-17 NOTE — PROGRESS NOTES
[facility]    Facility:   [facility] [[id number]]     Code Status: FULL CODE  PCP: Michele Guthrie MD   Phone: None   Fax: [phone]  [facility] 3/2 -3/10/18  [facility]  3/10    - 3/12/18  [facility] 3/12  - 3/22/2018     CHIEF COMPLAINT/REASON FOR VISIT:  Chief Complaint   Patient presents with     Discharge Summary       HISTORY COURSE:  Kallie is a 58 y.o. female who has had multiple recent medical issues.     She was admitted 3/2 through 3/10/18 at [facility] with a 2 month history of increased dyspnea, and was diagnosed with  an acute submassive pulmonary embolism, and right leg DVT.  She was not treated with lytics due to a possibility of a left lung mass being a malignancy.  She had a fever and leukocytosis that was felt to be from pulmonary infarct.    At that time she also had new atrial fibrillation with RVR, acute systolic heart failure with an ejection fraction of 20%, a loculated left pleural effusion status post thoracentesis ( negative for malignancy), acute hypoxemic respiratory failure, history of untreated Graves' disease with likely thyrotoxicosis (T3 was normal, T4 was high).     She was at [facility] TCU for 3 days, but developed fever, elevated white count and hypoxia on room air.  She was sent to [facility].  CT of the chest  showed extensive consolidation.  She was treated from her pneumonia with 7 days of vancomycin and Zosyn, with follow-up 14 day course of doxycycline per pulmonary consult.  She was still in A. fib with uncontrolled rate, digoxin and metoprolol were increased.  There is consideration for possible ablation in the future.  Repeat echo on 3-13-18 showed ejection fraction up to 35%, on 3/19/18 the EF had an increased to 52%.     Free T4 was checked and was within normal limits, she needs follow-up with endocrinology.     Discharge from [facility], she was  transferred to [facility]3 for rehabilitation and strengthening.  Heart rate was under good control  She was anticoagulated with ourRivaroxaban    She improved significantly in therapy, and I was able to discharge home in the care of her .  She elected outpatient therapy, she is not homebound.     Past Medical History:       Past Medical History:   Diagnosis Date     Acute respiratory failure       Atrial fibrillation       Atrial fibrillation with RVR       Loculated pleural effusion       Pulmonary embolism       Systolic CHF       Thyrotoxicosis with thyrotoxic crisis       Review of Systems   No issues    Vitals:    03/29/18 0900   BP: 113/61   Pulse: 66   Resp: 18   Temp: 98.4  F (36.9  C)   SpO2: 98%       Physical Exam      Thin, fatigued, middle-aged  female    No visible exopthalmos   Cardiovascular:.  No murmur heard.Irregularly irregular   Pulmonary/Chest: Breath sounds normal.  She has no wheezes. Decreased left base   Abdominal: Soft. Bowel sounds are normal, no distension,no tenderness.   Musculoskeletal: She exhibits no edema or deformity. Low muscle bulk .   Neurological: She is alert and oriented to person, place, and time.   Skin: Skin is warm and dry. No rash noted. No erythema.   Psychiatric: She has a normal mood but flat affect. Her behavior is normal       Labs:  Ref Range & Units    3/21/18   WBC 4.0 - 11.0 thou/uL 9.6   Hemoglobin 12.0 - 16.0 g/dL 10.9 (L)   MCV 80 - 100 fL 75 (L)   RDW 11.0 - 14.5 % 22.2 (H)   Platelets 140 - 440 thou/uL 543 (H)   Neutrophils % 50 - 70 % 77 (H)   Lymphocytes % 20 - 40 % 11 (L)      Ref Range & Units    3/21/18     Sodium 136 - 145 mmol/L 135 (L)   Potassium 3.5 - 5.0 mmol/L 4.1   Chloride 98 - 107 mmol/L 105   CO2 22 - 31 mmol/L 23   Anion Gap, Calculation 5 - 18 mmol/L 7   Glucose 70 - 125 mg/dL 95   Calcium 8.5 - 10.5 mg/dL 9.1   BUN 8 - 22 mg/dL 14   Creatinine 0.60 - 1.10 mg/dL 0.57 (L)   GFR MDRD Non Af Amer >60  mL/min/1.73m2 >60      Normal magnesium, pro calcitonin, Influenza screen        MEDICATION LIST:  Current Outpatient Prescriptions   Medication Sig     dextromethorphan (DELSYM 12 HOUR) 30 mg/5 mL liquid Take 60 mg by mouth 2 (two) times a day as needed for cough.     digoxin (LANOXIN) 250 mcg tablet Take 1 tablet (250 mcg total) by mouth daily with supper.     ferrous sulfate 325 (65 FE) MG tablet Take 1 tablet (325 mg total) by mouth 2 (two) times a day with meals.     furosemide (LASIX) 20 MG tablet Take 2 tablet (40 mg total) by mouth daily.     methIMAzole (TAPAZOLE) 10 MG tablet Take 1 tablet (10 mg total) by mouth 3 (three) times a day.     metoprolol tartrate (LOPRESSOR) 100 MG tablet Take 1 tablet (100 mg total) by mouth 2 (two) times a day.     nicotine (NICODERM CQ) 14 mg/24 hr Place 1 patch on the skin daily.     omeprazole (PRILOSEC) 20 MG capsule Take 1 capsule (20 mg total) by mouth daily before breakfast for 14 days.     rivaroxaban (XARELTO) 20 mg Tab Take 1 tablet (20 mg total) by mouth daily with supper.          DISCHARGE DIAGNOSIS:      ICD-10-CM    1. Left upper lobe pneumonia J18.1    2. Pulmonary emboli I26.99    3. DVT, lower extremity, distal, acute, right I82.4Z1    4. Pulmonary infarct I26.99    5. Left ventricular dysfunction I51.9    6. Graves disease E05.00    7. Persistent atrial fibrillation I48.1    8. Tobacco dependence F17.200    9. Pulmonary mass R91.8          MEDICAL EQUIPMENT NEEDS:  none    DISCHARGE PLAN/FACE TO FACE:  Out patient PT  Endocrinology follow-up ongoing treatment of Graves' disease  Cardiology follow-up, possible future ablation  Repeat CT chest  approximately 3 months due to possible pulmonary hilar mass      The patient is, or has been, under my care and I have initiated the establishment of the plan of care. This patient will be followed by a physician who will periodically review the plan of care.    Schedule follow up visit with Dr Guthrie within 7 days to  reestablish care.    Time 55 minutes, greater than 50% face-to-face reviewing hospital courses, labs, discharge instructions and discussion with patient.  Electronically signed by: Bailee Levy MD

## 2021-06-17 NOTE — PROGRESS NOTES
Progress Note    Reason for Visit:  Chief Complaint     Thyroid Problem          Progress Note:    HPI:    This patient seen in consultation at the request of the primary care physician on emergent basis because of thyrotoxic crisis.  Thank you for recent referring this pleasant 58-year-old female patient who came to the clinic accompanied by a friend.  She gives me a history likely from the beginning of the year she started to feel very weak and then she developed exertional nocturnal dyspnea and she was sent to him to hospital and spend long time in the ICU.    Going back to her history i.e. so that back in 2014 her TSH was undetectable free T4 [phone] while obviously the patient has diagnosis of Graves' disease.    She was not taking any medication no family history of thyroid disorder she is denying hysterectomy she has she is  she has 1 child she was a smoker up until the time of the hospital admission.    Thyroid exam clinically thyroid enlargement.  She has some evidence of Graves' eye disease that is not active she has mild lid stare.    Component      Latest Ref Rng & Units 3/20/2018 3/21/2018 3/21/2018 3/22/2018            5:23 AM  5:23 AM    Sodium      136 - 145 mmol/L   135 (L)    Potassium      3.5 - 5.0 mmol/L 4.2 4.2 4.1 4.1   Chloride      98 - 107 mmol/L   105    CO2      22 - 31 mmol/L   23    Anion Gap, Calculation      5 - 18 mmol/L   7    Glucose      70 - 125 mg/dL   95    Calcium      8.5 - 10.5 mg/dL   9.1    BUN      8 - 22 mg/dL   14    Creatinine      0.60 - 1.10 mg/dL   0.57 (L)    GFR MDRD Af Amer      >60 mL/min/1.73m2   >60    GFR MDRD Non Af Amer      >60 mL/min/1.73m2   >60    Magnesium      1.8 - 2.6 mg/dL 1.9 2.0     Free T4      0.7 - 1.8 ng/dL 1.0        Cardiomyopathy   Summary   1. The left ventricle is normal in size. Left ventricular systolic performance is severely reduced. The ejection fraction is estimated to be 20%.   2. There is severe global reduction in  left ventricular systolic performance.  3. There is mild aortic insufficiency.   4. There is mild, to perhaps mild-moderate, mitral insufficiency.   5. There is moderate tricuspid insufficiency.   6. There is mild to moderate right ventricular enlargement with moderate to severe reduction right ventricular systolic performance.  7. There is mild to moderate left atrial enlargement. There is moderate right atrial enlargement.   8. The IVC appears dilated with decreased phasic variation in caval diameter consistent with elevated right atrial pressure.            Review of Systems:    Nervous System: No headache, dizziness, fainting or memory loss. No tingling sensation of hand or feet.  Ears: No hearing loss or ringing in the ears  Eyes: No blurring of vision, redness, itching or dryness.  Nose: No nosebleed or loss of smell  Mouth: No mouth sores or loss of taste  Throat: No hoarseness or difficulty swallowing  Neck: No enlarged thyroid or lymph nodes.  Heart: No chest pain, palpitation or irregular heartbeat. No swelling of hands or feet  Lungs: No shortness of breath, cough, night sweats, wheezing or hemoptysis.  Gastrointestinal: No nausea or vomiting, constipation or diarrhea.  No acid reflux, abdominal pain or blood in stools.  Kidney/Bladdr: No polyuria, polydipsia, nocturia or hematuria.  Genital/Sexual: No loss of libido  Skin: No rash, hair loss or hirsutism.  No abnormal striae  Muscles/Joints/Bones: No morning stiffness, muscle aches and pain or loss of height.    Current Medications:  Current Outpatient Prescriptions   Medication Sig     doxycycline (MONODOX) 100 MG capsule Take 1 capsule (100 mg total) by mouth 2 (two) times a day for 7 days.     ferrous sulfate 325 (65 FE) MG tablet Take 1 tablet (325 mg total) by mouth 2 (two) times a day with meals.     furosemide (LASIX) 20 MG tablet Take 2 tablets (40 mg total) by mouth daily.     methIMAzole (TAPAZOLE) 10 MG tablet Take 1 tablet (10 mg total) by  mouth 3 (three) times a day.     metoprolol tartrate (LOPRESSOR) 100 MG tablet Take 1 tablet (100 mg total) by mouth 2 (two) times a day.     nicotine (NICODERM CQ) 14 mg/24 hr Place 1 patch on the skin daily.     omeprazole (PRILOSEC) 20 MG capsule Take 1 capsule (20 mg total) by mouth daily before breakfast for 14 days.     rivaroxaban 15 mg Tab Take 1 tablet (15 mg total) by mouth 2 (two) times a day with meals. 15mg BID for 3  days followed by 20mg daily with food indefinitely     dextromethorphan (DELSYM 12 HOUR) 30 mg/5 mL liquid Take 60 mg by mouth 2 (two) times a day as needed for cough.     digoxin (LANOXIN) 250 mcg tablet Take 1 tablet (250 mcg total) by mouth daily with supper.       Patients Active Problems:  Patient Active Problem List   Diagnosis     Persistent atrial fibrillation     Loculated pleural effusion     PE     Thyrotoxicosis with thyrotoxic crisis, unspecified thyrotoxicosis type     Tobacco user     Pneumonia     Pulmonary infarct     History of pulmonary embolism     History of Graves' disease     Dilated cardiomyopathy     Acute on chronic systolic congestive heart failure     Leukocytosis     Fever, unspecified fever cause     LV dysfunction     History of DVT (deep vein thrombosis)       History:   reports that she quit smoking about 4 weeks ago. Her smoking use included Cigarettes. She has a 30.00 pack-year smoking history. She has never used smokeless tobacco. She reports that she does not drink alcohol or use illicit drugs.   reports that she quit smoking about 4 weeks ago. Her smoking use included Cigarettes. She has a 30.00 pack-year smoking history. She has never used smokeless tobacco. She reports that she does not drink alcohol or use illicit drugs.  History   Smoking Status     Former Smoker     Packs/day: 1.00     Years: 30.00     Types: Cigarettes     Quit date: 2/23/2018   Smokeless Tobacco     Never Used      reports that she quit smoking about 4 weeks ago. Her smoking use  "included Cigarettes. She has a 30.00 pack-year smoking history. She has never used smokeless tobacco. She reports that she does not drink alcohol or use illicit drugs.  History   Sexual Activity     Sexual activity: Not on file     Past Medical History:   Diagnosis Date     Acute respiratory failure      Atrial fibrillation      Atrial fibrillation with RVR      Loculated pleural effusion      Pulmonary embolism      Systolic CHF      Thyrotoxicosis with thyrotoxic crisis      No family history on file.  Past Medical History:   Diagnosis Date     Acute respiratory failure      Atrial fibrillation      Atrial fibrillation with RVR      Loculated pleural effusion      Pulmonary embolism      Systolic CHF      Thyrotoxicosis with thyrotoxic crisis      Past Surgical History:   Procedure Laterality Date     PICC  3/13/2018            Vitals   height is 5' 4\" (1.626 m). Her blood pressure is 122/74. Her respiration is 18.         Exam  General appearance: The patient looked well, not in acute distress.  Eyes: no evidence of thyroid eye disease.   Retinal exam: No evidence of diabetic retinopathy.  Mouth and Throat: Normal  Neck: No evidence of thyromegaly, enlarged lymph node or tenderness  Chest: Trachea is central. Chest is clear to auscultation and percussion. Breat sounds are normal.  Cardiovascular exam: JVP is not raised. Heart sounds are normal, no murmurs or rub  Peripheral pulses are palpable.   Abdomen: No masses or tenderness.    Back: No vertebral tenderness or kyphosis.  Extremities: No evidence of leg edema.   Skin: Normal to touch.  No abnormal striae  Neurologic exam:  Visual fields are intact by confrontation, grossly intact. No evidence of peripheral neuropathy.  Detailed foot exam normal.        Diagnosis:  No diagnosis found.    Orders:   No orders of the defined types were placed in this encounter.        Assessment and Plan: Hyperthyroidism with the patient had thyrotoxic crisis with thyrotoxic " cardiomyopathy and heart failure.    She was started on Tapazole 10 mg 3 times a day TSH still undetectable but the free T4 came down-1.2.    And now it is 1.    Patient is feeling much better no paroxysmal nocturnal dyspnea or shortness of breath.  She had aspiration of her left lung from pleural effusion I examined her I could see that there is evidence of aortic insufficiency mitral insufficiency and tricuspid tricuspid insufficiency she is in atrial fibrillation heart rate is controlled these occasional langston waves in the JVP.  I can see thyroid enlargement.    JVP is not raised.  Lungs are clear no evidence of effusion.    The patient is on digoxin 250 mcg daily for the atrial fibrillation she is also taken Xarelto.    I discussed different modalities of treatment of the patient pros and cons of each including a granulocytosis she opted to go with that episode we will check her thyroid function test in 6 weeks and probably reduce the dose to 20 mg.    She takes iron 325 mg daily Lasix 20 mg once a day metoprolol 100 mg twice a day as a said heart rate atrial fibrillation is controlled is around 60.    She takes Prilosec 20 mg.    Patient return to clinic in 6 months.  I will do thyroid ultrasound.    I have reviewed and ordered clinical lab test    I have reviewed and ordered radiology tests.    I have reviewed and ordered her medication as required.    I have reviewed her test results and advised with the performing physician.    I have reviewed the patient's old records.    I have reviewed and summarized the patient old records.    I did spend 60 minutes with the patient more than 50% was spent on counseling and managing her care.

## 2021-06-17 NOTE — ANESTHESIA POSTPROCEDURE EVALUATION
Patient: Kallie Barker  * No procedures listed *  Anesthesia type: No value filed.    Patient location: Telemetry/Step Down Unit  Last vitals:   Vitals:    05/04/18 1346   BP: 99/55   Pulse: (!) 39   Resp: 21   Temp: 36.4  C (97.5  F)   SpO2: 98%     Post vital signs: stable  Level of consciousness: awake, alert and oriented  Post-anesthesia pain: pain controlled  Post-anesthesia nausea and vomiting: no  Pulmonary: unassisted, return to baseline  Cardiovascular: stable and blood pressure at baseline  Hydration: adequate  Anesthetic events: no    QCDR Measures:  ASA# 11 - Dinah-op Cardiac Arrest: ASA11B - Patient did NOT experience unanticipated cardiac arrest  ASA# 12 - Dinah-op Mortality Rate: ASA12B - Patient did NOT die  ASA# 13 - PACU Re-Intubation Rate: NA - No ETT / LMA used for case  ASA# 10 - Composite Anes Safety: ASA10A - No serious adverse event    Additional Notes:

## 2021-06-20 NOTE — PROGRESS NOTES
Progress Note    Reason for Visit:  Chief Complaint     Thyroid Problem          Progress Note:    HPI: This patient is here for follow-up because of hyperthyroidism.    Component      Latest Ref Rng & Units 3/28/2018 5/4/2018 9/24/2018               Sodium      136 - 145 mmol/L      Potassium      3.5 - 5.0 mmol/L  4.5    Chloride      98 - 107 mmol/L      CO2      22 - 31 mmol/L      Anion Gap, Calculation      5 - 18 mmol/L      Glucose      70 - 125 mg/dL      Calcium      8.5 - 10.5 mg/dL      BUN      8 - 22 mg/dL      Creatinine      0.60 - 1.10 mg/dL      GFR MDRD Af Amer      >60 mL/min/1.73m2      GFR MDRD Non Af Amer      >60 mL/min/1.73m2      TSH      0.30 - 5.00 uIU/mL <0.01 (L)  3.34   Free T4      0.7 - 1.8 ng/dL 1.2  0.7   T3, Total      45 - 175 ng/dL   69         Review of Systems:    Nervous System: No headache, dizziness, fainting or memory loss. No tingling sensation of hand or feet.  Ears: No hearing loss or ringing in the ears  Eyes: No blurring of vision, redness, itching or dryness.  Nose: No nosebleed or loss of smell  Mouth: No mouth sores or loss of taste  Throat: No hoarseness or difficulty swallowing  Neck: No enlarged thyroid or lymph nodes.  Heart: No chest pain, palpitation or irregular heartbeat. No swelling of hands or feet  Lungs: No shortness of breath, cough, night sweats, wheezing or hemoptysis.  Gastrointestinal: No nausea or vomiting, constipation or diarrhea.  No acid reflux, abdominal pain or blood in stools.  Kidney/Bladdr: No polyuria, polydipsia, nocturia or hematuria.  Genital/Sexual: No loss of libido  Skin: No rash, hair loss or hirsutism.  No abnormal striae  Muscles/Joints/Bones: No morning stiffness, muscle aches and pain or loss of height.    Current Medications:  Current Outpatient Prescriptions   Medication Sig     escitalopram oxalate (LEXAPRO) 10 MG tablet Take 10 mg by mouth daily.     ferrous sulfate 325 (65 FE) MG tablet Take 1 tablet by mouth daily with  breakfast.     methIMAzole (TAPAZOLE) 10 MG tablet Take 1 tablet (10 mg total) by mouth 3 (three) times a day.     rivaroxaban (XARELTO) 20 mg Tab Take 1 tablet (20 mg total) by mouth daily with supper.     metoprolol tartrate (LOPRESSOR) 25 MG tablet Take 1 tab at onset of A fib     multivitamin therapeutic tablet Take 1 tablet by mouth daily.       Patients Active Problems:  Patient Active Problem List   Diagnosis     Persistent atrial fibrillation (H)     Loculated pleural effusion     PE     Thyrotoxicosis with thyrotoxic crisis, unspecified thyrotoxicosis type     Tobacco user     Pneumonia     Pulmonary infarct (H)     History of pulmonary embolism     History of Graves' disease     Dilated cardiomyopathy (H)     Acute on chronic systolic congestive heart failure (H)     Leukocytosis     History of DVT (deep vein thrombosis)       History:   reports that she quit smoking about 7 months ago. Her smoking use included Cigarettes. She has a 30.00 pack-year smoking history. She has never used smokeless tobacco. She reports that she does not drink alcohol or use illicit drugs.   reports that she quit smoking about 7 months ago. Her smoking use included Cigarettes. She has a 30.00 pack-year smoking history. She has never used smokeless tobacco. She reports that she does not drink alcohol or use illicit drugs.  History   Smoking Status     Former Smoker     Packs/day: 1.00     Years: 30.00     Types: Cigarettes     Quit date: 2/23/2018   Smokeless Tobacco     Never Used      reports that she quit smoking about 7 months ago. Her smoking use included Cigarettes. She has a 30.00 pack-year smoking history. She has never used smokeless tobacco. She reports that she does not drink alcohol or use illicit drugs.  History   Sexual Activity     Sexual activity: Not on file     Past Medical History:   Diagnosis Date     Acute respiratory failure (H)      Arrhythmia      Atrial fibrillation (H)      Atrial fibrillation with RVR  "(H)      cataract bilateral      Depression      DVT of lower extremity (deep venous thrombosis) (H) 03/2018    rt leg     Loculated pleural effusion      Pulmonary embolism (H) 03/2018     RBBB      Systolic CHF (H)      Thyrotoxicosis with thyrotoxic crisis      No family history on file.  Past Medical History:   Diagnosis Date     Acute respiratory failure (H)      Arrhythmia      Atrial fibrillation (H)      Atrial fibrillation with RVR (H)      cataract bilateral      Depression      DVT of lower extremity (deep venous thrombosis) (H) 03/2018    rt leg     Loculated pleural effusion      Pulmonary embolism (H) 03/2018     RBBB      Systolic CHF (H)      Thyrotoxicosis with thyrotoxic crisis      Past Surgical History:   Procedure Laterality Date     APPENDECTOMY       CARDIOVERSION  05/04/2018     PICC  3/13/2018            Vitals   height is 5' 4\" (1.626 m) and weight is 128 lb (58.1 kg). Her blood pressure is 110/74.         Exam  General appearance: The patient looked well, not in acute distress.  Eyes: no evidence of thyroid eye disease.   Retinal exam: No evidence of diabetic retinopathy.  Mouth and Throat: Normal  Neck: No evidence of thyromegaly, enlarged lymph node or tenderness  Chest: Trachea is central. Chest is clear to auscultation and percussion. Breat sounds are normal.  Cardiovascular exam: JVP is not raised. Heart sounds are normal, no murmurs or rub  Peripheral pulses are palpable.   Abdomen: No masses or tenderness.    Back: No vertebral tenderness or kyphosis.  Extremities: No evidence of leg edema.   Skin: Normal to touch.  No abnormal striae  Neurologic exam:  Visual fields are intact by confrontation, grossly intact. No evidence of peripheral neuropathy.  Detailed foot exam normal.        Diagnosis:  No diagnosis found.    Orders:   No orders of the defined types were placed in this encounter.        Assessment and Plan: Hyperthyroidism.  I have seen this patient 6 months ago she had " hypertensive crisis she had hyperthyroidism for a long period of time that was not treated on at the end she developed heart failure and was admitted to hospital.    At that time I started her on Tapazole 30 mg daily and she still is taking this dose.    Heart failure now is under very good control.  She was in atrial fibrillation she had a cardioversion she is now in sinus rhythm and she takes Xarelto 20 mg once a day.    TSH recovered from undetectable to 3.34, free T4 came down from 1.9-0.7-3369.    I discussed with the patient to decrease Tapazole to, 10 mg once a day.  Again I did advise the patient to stay on this medication at least for 1 year and then we will gradually reduce the dose if she rebounded into hypothyroidism then we have to do radioactive iodine treatment.    Heart sounds are much better than they were before on the multiple valve incompetence has improved.    Patient was on digoxin and metoprolol that was stopped.    Patient return to clinic in 6 months we will check thyroid function test every 3-month.    I have reviewed and ordered clinical lab test    I have reviewed and ordered radiology tests.    I have reviewed and ordered her medication as required.    I have reviewed her test results and advised with the performing physician.    I have reviewed the patient's old records.    I have reviewed and summarized the patient old records.    I did spend 40 minutes with the patient more than 50% was spent on counseling and managing her care.

## 2021-06-26 NOTE — PROGRESS NOTES
Progress Notes by Esperanza Gonzales MD at 4/27/2018  1:30 PM     Author: Esperanza Gonzales MD Service: -- Author Type: Physician    Filed: 4/27/2018  2:33 PM Encounter Date: 4/27/2018 Status: Addendum    : Esperanza Gonzales MD (Physician)    Related Notes: Original Note by Esperanza Gonzales MD (Physician) filed at 4/27/2018  2:12 PM           Click to link to [facility] HEART CARE NOTE    Thank you, Dr. Guthrie, for asking us to see Kallie Barker at the [facility].      Assessment/Recommendations   Assessment:    1.  Cardiomyopathy: Biventricular congestive heart failure with initially left ventricular ejection fraction 20% now improved to 52% with better heart rate control.  Right ventricular function has improved as well with treatment of her pulmonary emboli.  2.  Atrial fibrillation: Persistent atrial fibrillation on metoprolol 100 mg twice a day and digoxin 250 MCG daily for rate control.  EKG today shows atrial fibrillation at 66 bpm with right bundle branch block.  Will recommend decreasing digoxin to 125 MCG daily.  she has been on Xarelto for over a month now and is scheduled for cardioversion next Friday.    3.  Anticoagulation: Continue on Xarelto both for treatment of pulmonary embolism as well as for her atrial fibrillation.  With her submassive pulmonary embolism would consider lifelong treatment.  4.  Counseled on continued smoking cessation.  5.  Graves' disease with thyrotoxicosis       History of Present Illness    Ms. Kallie Barker is a 58 y.o. female with history of prolonged hospitalization with follow-up in TCU who I am now seeing in follow-up.  She had history of Graves' disease with thyrotoxicosis and was admitted to the hospital in March with severe respiratory failure and found to have submassive pulmonary embolism with right-sided DVT.  She was also in persistent atrial fibrillation with elevated ventricular  response and found to be in biventricular heart failure thought to be secondary to her pulmonary embolism and tachycardia mediated sided failure.  She was discharged however then readmitted shortly afterwards with worsening hypoxic respiratory failure, hypotension and bradycardia.  She was treated for pneumonia and her diltiazem was stopped and digoxin added in its place.  She remained on metoprolol 100 mg twice daily.  Today she appears much improved.  She reports her breathing has significantly improved.  She has continued to stop smoking.  She has not noted any chest pain or palpitations.    Echocardiogram 3/19/2018    Left Ventricle: Normal left ventricular size.The calculated left ventricular ejection fraction is 52%. Mild concentric hypertrophy noted.    Right Ventricle: The right ventricle is mildly dilated. The systolic function is mildly reduced. TAPSE is abnormal, which is consistent with abnormal right ventricular systolic function.    Tricuspid Valve: Mild to moderate tricuspid valve regurgitation. The estimated systolic pulmonary artery pressure is 47+ right atrial pressure mmhg.      Compared to the echocardiogram from 3/13/2018, the left ventricular systolic function is improved significantly.  The right ventricular systolic function is also improved.  This is a limited study so full Doppler studies not performed.          Physical Examination Review of Systems   Vitals:    04/27/18 1333   BP: 104/60   Pulse: 64   Resp: 16     Body mass index is 20.08 kg/(m^2).  Wt Readings from Last 3 Encounters:   04/27/18 117 lb (53.1 kg)   04/02/18 115 lb (52.2 kg)   03/26/18 112 lb 1.6 oz (50.8 kg)       General Appearance:   alert, no apparent distress   HEENT:  no scleral icterus; the mucous membranes are pink and moist                                  Neck: jugular venous pressure normal   Chest: the spine is straight and the chest is symmetric   Lungs:   respirations unlabored; the lungs are clear to  auscultation   Cardiovascular:   irregular rhythm with normal first and second heart sounds and no murmurs or gallops   Abdomen:  no organomegaly, masses, bruits, or tenderness; bowel sounds are present   Extremities: no edema   Skin: no xanthelasma    General: WNL  Eyes: Visual Distubance  Ears/Nose/Throat: WNL  Lungs: WNL  Heart: WNL  Stomach: WNL  Bladder: WNL  Muscle/Joints: WNL  Skin: WNL  Nervous System: WNL  Mental Health: WNL     Blood: WNL     Medical History  Surgical History Family History Social History   Past Medical History:   Diagnosis Date   ? Acute respiratory failure    ? Atrial fibrillation    ? Atrial fibrillation with RVR    ? Loculated pleural effusion    ? Pulmonary embolism    ? Systolic CHF    ? Thyrotoxicosis with thyrotoxic crisis     Past Surgical History:   Procedure Laterality Date   ? PICC  3/13/2018         No family history of premature coronary artery disease Social History     Social History   ? Marital status:      Spouse name: N/A   ? Number of children: N/A   ? Years of education: N/A     Occupational History   ? Not on file.     Social History Main Topics   ? Smoking status: Former Smoker     Packs/day: 1.00     Years: 30.00     Types: Cigarettes     Quit date: 2/23/2018   ? Smokeless tobacco: Never Used   ? Alcohol use No   ? Drug use: No   ? Sexual activity: Not on file     Other Topics Concern   ? Not on file     Social History Narrative          Medications  Allergies   Current Outpatient Prescriptions   Medication Sig Dispense Refill   ? dextromethorphan (DELSYM 12 HOUR) 30 mg/5 mL liquid Take 60 mg by mouth 2 (two) times a day as needed for cough.     ? digoxin (LANOXIN) 250 mcg tablet Take 1 tablet (250 mcg total) by mouth daily with supper. 30 tablet 3   ? escitalopram oxalate (LEXAPRO) 10 MG tablet Take 10 mg by mouth daily.     ? ferrous sulfate 325 (65 FE) MG tablet Take 1 tablet by mouth daily with breakfast.     ? furosemide (LASIX) 20 MG tablet Take 1  tablet (20 mg total) by mouth daily. 30 tablet 3   ? methIMAzole (TAPAZOLE) 10 MG tablet Take 1 tablet (10 mg total) by mouth 3 (three) times a day. 270 tablet 1   ? metoprolol tartrate (LOPRESSOR) 100 MG tablet Take 1 tablet (100 mg total) by mouth 2 (two) times a day. 60 tablet 6   ? multivitamin therapeutic tablet Take 1 tablet by mouth daily.     ? rivaroxaban (XARELTO) 20 mg Tab Take 1 tablet (20 mg total) by mouth daily with supper. 30 tablet 6   ? rivaroxaban 15 mg Tab Take 1 tablet (15 mg total) by mouth 2 (two) times a day with meals. 15mg BID for 3  days followed by 20mg daily with food indefinitely 30 tablet 0     No current facility-administered medications for this visit.       No Known Allergies      Lab Results    Chemistry/lipid CBC Cardiac Enzymes/BNP/TSH/INR   Lab Results   Component Value Date    CREATININE 0.57 (L) 03/25/2018    BUN 18 03/25/2018    K 3.9 03/25/2018     03/25/2018     (H) 03/25/2018    CO2 20 (L) 03/25/2018    Lab Results   Component Value Date    WBC 9.6 03/21/2018    HGB 10.7 (L) 03/25/2018    HCT 35.3 03/21/2018    MCV 75 (L) 03/21/2018     (H) 03/21/2018    Lab Results   Component Value Date    TROPONINI 0.05 03/12/2018    BNP 2790 (H) 03/19/2018    TSH <0.01 (L) 03/28/2018    INR 3.85 (H) 03/12/2018

## 2021-06-26 NOTE — PROGRESS NOTES
Progress Notes by Sidra Baltazar CNP at 7/12/2018  3:30 PM     Author: Sidra Baltazar CNP Service: -- Author Type: Nurse Practitioner    Filed: 7/12/2018  5:04 PM Encounter Date: 7/12/2018 Status: Signed    : Sidra Baltazar CNP (Nurse Practitioner)           Click to link to [facility] Heart Care     [facility] HEART [facility] ELECTROPHYSIOLOGY NOTE      Assessment/Recommendations   Assessment/Plan:    1.  Persistent atrial fibrillation: No symptomatology or evidence of recurrence since cardioversion in May.  This was likely a lone episode due to hyperthyroidism.  She was instructed to call if she has recurrence of A. fib.  She was reassured that atrial fibrillation is not life-threatening, but carries an increased risk for stroke.  She has a [id number] score of 3 for female gender and prior PE.  Lifelong anticoagulation has been recommended due to submassive PE.  Continue Xarelto 20 mg daily.  We reviewed the importance of taking this with a full meal; she takes this every evening with her dinner.    2.  History of acute heart failure with reduced ejection fraction: Due to thyrotoxic crisis.  She has subsequently had normalization of her left ventricular systolic performance.  She has no sign of acute fluid overload on exam.  Discontinue furosemide.  She was instructed to continue with daily weights and to call for signs of fluid retention.    Follow up with Dr. Gonzales in 6 months and with me in 1 year.     History of Present Illness    Ms. Kallie Barker is a very pleasant 58 y.o. female who comes in today for EP follow-up of atrial fibrillation.  Has a history of Graves' disease initially diagnosed in 2011, but untreated until she had thyrotoxicosis with thyrotoxic crisis in early March 2017.  She was noted to be in atrial fibrillation with rapid ventricular response, have severe left ventricular systolic dysfunction with an EF of 20%, and right lower extremity DVT with submassive pulmonary embolism.  Started  Pt calling, states her abdominal issue is becoming more painful. States she can handle the pain if needed, but it is now painful to the touch.    Would like to know if theres anything we can do? or if she should just wait until her US?   on methimazole for hyperthyroidism, ventricular rates were controlled with metoprolol and digoxin, and she was started on Xarelto for both PE and A. fib.  She remained in atrial fibrillation, so underwent cardioversion on 5/4/2018 after which digoxin was discontinued.  Metoprolol was subsequently discontinued due to sinus bradycardia.  Follow-up echo shows normalization of her left ventricular systolic performance.    Kallie states that she is feeling well.  She has not had any symptomatic episodes of A. fib, or sudden elevations in heart rate.   She denies chest discomfort, palpitations, abdominal fullness/bloating or peripheral edema, shortness of breath, paroxysmal nocturnal dyspnea, orthopnea, lightheadedness, dizziness, pre-syncope, or syncope.    Cardiographics (personally reviewed):  EKG, Done 5/4/2018 shows sinus bradycardia at 42 bpm, QT/QTc interval measures 498/415 ms    Echo done 3/19/2018:    Left Ventricle: Normal left ventricular size.The calculated left ventricular ejection fraction is 52%. Mild concentric hypertrophy noted.    Right Ventricle: The right ventricle is mildly dilated. The systolic function is mildly reduced. TAPSE is abnormal, which is consistent with abnormal right ventricular systolic function.    Tricuspid Valve: Mild to moderate tricuspid valve regurgitation. The estimated systolic pulmonary artery pressure is 47+ right atrial pressure mmhg.      Compared to the echocardiogram from 3/13/2018, the left ventricular systolic function is improved significantly.  The right ventricular systolic function is also improved.  This is a limited study so full Doppler studies not performed.     Physical Examination Review of Systems   Vitals:    07/12/18 1530   BP: 98/62   Pulse: 64   Resp: 16     Body mass index is 21.63 kg/(m^2).  Wt Readings from Last 3 Encounters:   07/12/18 126 lb (57.2 kg)   05/04/18 115 lb (52.2 kg)   04/27/18 117 lb (53.1 kg)     General Appearance:   Alert,  well-appearing and in no acute distress.   HEENT: Atraumatic, normocephalic.  No scleral icterus, normal conjunctivae, EOM intact; mucous membranes pink and moist.     Chest: Chest symmetric, spine straight.   Lungs:   Respirations unlabored: Lungs are clear to auscultation.   Cardiovascular:   Normal first and second heart sounds with no murmurs, rubs, or gallops.  Regular rate and rhythm.  Radial and posterior tibial pulses are intact, no JVD, no edema.   Abdomen:  Soft, nontender, nondistended, bowel sounds present   Extremities: No cyanosis or clubbing   Musculoskeletal: Moves all extremities   Skin: Warm, dry, intact.    Neurologic: Mood and affect are appropriate, alert and oriented to person, place, time, and situation    General: WNL  Eyes: WNL  Ears/Nose/Throat: WNL  Lungs: WNL  Heart: WNL  Stomach: WNL  Bladder: WNL  Muscle/Joints: WNL  Skin: WNL  Nervous System: WNL  Mental Health: WNL     Blood: WNL     Medical History  Surgical History Family History Social History   Past Medical History:   Diagnosis Date   ? Acute respiratory failure (H)    ? Arrhythmia    ? Atrial fibrillation (H)    ? Atrial fibrillation with RVR (H)    ? cataract bilateral    ? Depression    ? DVT of lower extremity (deep venous thrombosis) (H) 03/2018    rt leg   ? Loculated pleural effusion    ? Pulmonary embolism (H) 03/2018   ? RBBB    ? Systolic CHF (H)    ? Thyrotoxicosis with thyrotoxic crisis     Past Surgical History:   Procedure Laterality Date   ? APPENDECTOMY     ? CARDIOVERSION  05/04/2018   ? PICC  3/13/2018         History reviewed. No pertinent family history. Social History     Social History   ? Marital status:      Spouse name: N/A   ? Number of children: N/A   ? Years of education: N/A     Occupational History   ? Not on file.     Social History Main Topics   ? Smoking status: Former Smoker     Packs/day: 1.00     Years: 30.00     Types: Cigarettes     Quit date: 2/23/2018   ? Smokeless tobacco: Never Used    ? Alcohol use No   ? Drug use: No   ? Sexual activity: Not on file     Other Topics Concern   ? Not on file     Social History Narrative          Medications  Allergies   Current Outpatient Prescriptions   Medication Sig Dispense Refill   ? escitalopram oxalate (LEXAPRO) 10 MG tablet Take 10 mg by mouth daily.     ? ferrous sulfate 325 (65 FE) MG tablet Take 1 tablet by mouth daily with breakfast.     ? methIMAzole (TAPAZOLE) 10 MG tablet Take 1 tablet (10 mg total) by mouth 3 (three) times a day. 270 tablet 1   ? metoprolol tartrate (LOPRESSOR) 25 MG tablet Take 1 tab at onset of A fib 30 tablet 3   ? multivitamin therapeutic tablet Take 1 tablet by mouth daily.     ? rivaroxaban (XARELTO) 20 mg Tab Take 1 tablet (20 mg total) by mouth daily with supper. 30 tablet 6     No current facility-administered medications for this visit.       No Known Allergies   Medical, surgical, family, social history, and medications were all reviewed and updated as necessary.   Lab Results    Chemistry CBC Other   Lab Results   Component Value Date    CREATININE 0.57 (L) 03/25/2018    BUN 18 03/25/2018     03/25/2018    K 4.5 05/04/2018     (H) 03/25/2018    CO2 20 (L) 03/25/2018     Creatinine (mg/dL)   Date Value   03/25/2018 0.57 (L)   03/21/2018 0.57 (L)   03/19/2018 0.49 (L)   03/16/2018 0.53 (L)    Lab Results   Component Value Date    WBC 9.6 03/21/2018    HGB 10.7 (L) 03/25/2018    HCT 35.3 03/21/2018    MCV 75 (L) 03/21/2018     (H) 03/21/2018    Lab Results   Component Value Date    INR 3.85 (H) 03/12/2018    INR 1.60 (H) 03/02/2018     Lab Results   Component Value Date    TSH <0.01 (L) 03/28/2018            Greater than than 30 minutes were spent face to face in this visit with more than 50% spent discussing diagnoses as listed above, counseling, and coordination of care.    This note has been dictated using voice recognition software. Any grammatical, typographical, or context distortions are  unintentional and inherent to the software.    Sidra Baltazar, [facility] Heart [facility]   Electrophysiology

## 2021-07-03 NOTE — ADDENDUM NOTE
Addendum Note by Gilda Farah RN at 3/9/2020  2:24 PM     Author: Gilda Farah RN Service: -- Author Type: Registered Nurse    Filed: 3/9/2020  2:24 PM Encounter Date: 1/11/2020 Status: Signed    : Gilda Farah RN (Registered Nurse)    Addended by: GILDA FARAH on: 3/9/2020 02:24 PM        Modules accepted: Orders

## 2021-07-03 NOTE — ADDENDUM NOTE
Addendum Note by Kimi Lynn MD at 5/4/2018  1:57 PM     Author: Kimi Lynn MD Service: -- Author Type: Physician    Filed: 5/4/2018  1:57 PM Date of Service: 5/4/2018  1:57 PM Status: Signed    : Kimi Lynn MD (Physician)       Addendum  created 05/04/18 1357 by Kimi Lynn MD    Sign clinical note

## 2021-07-03 NOTE — ADDENDUM NOTE
Addendum Note by Dallas Allen MD at 4/27/2018  2:33 PM     Author: Dallas Allen MD Service: -- Author Type: Physician    Filed: 4/27/2018  2:33 PM Encounter Date: 4/27/2018 Status: Signed    : Dallas Allen MD (Physician)    Addended by: DALLAS ALLEN on: 4/27/2018 02:33 PM        Modules accepted: Orders

## 2021-07-14 PROBLEM — J96.01 ACUTE RESPIRATORY FAILURE WITH HYPOXIA (H): Status: RESOLVED | Noted: 2018-03-02 | Resolved: 2018-03-23

## 2022-10-20 ENCOUNTER — APPOINTMENT (OUTPATIENT)
Dept: CT IMAGING | Facility: CLINIC | Age: 62
End: 2022-10-20
Attending: EMERGENCY MEDICINE
Payer: COMMERCIAL

## 2022-10-20 ENCOUNTER — HOSPITAL ENCOUNTER (INPATIENT)
Facility: CLINIC | Age: 62
LOS: 3 days | Discharge: HOME | End: 2022-10-23
Attending: EMERGENCY MEDICINE | Admitting: INTERNAL MEDICINE
Payer: COMMERCIAL

## 2022-10-20 DIAGNOSIS — I50.9 ACUTE ON CHRONIC CONGESTIVE HEART FAILURE, UNSPECIFIED HEART FAILURE TYPE (H): ICD-10-CM

## 2022-10-20 DIAGNOSIS — R79.89 ELEVATED LFTS: ICD-10-CM

## 2022-10-20 DIAGNOSIS — R93.1 DECREASED CARDIAC EJECTION FRACTION: Chronic | ICD-10-CM

## 2022-10-20 DIAGNOSIS — E05.90 HYPERTHYROIDISM: ICD-10-CM

## 2022-10-20 DIAGNOSIS — I48.91 ATRIAL FIBRILLATION WITH RVR (H): ICD-10-CM

## 2022-10-20 DIAGNOSIS — I26.99 OTHER ACUTE PULMONARY EMBOLISM, UNSPECIFIED WHETHER ACUTE COR PULMONALE PRESENT (H): ICD-10-CM

## 2022-10-20 DIAGNOSIS — I26.09 OTHER ACUTE PULMONARY EMBOLISM WITH ACUTE COR PULMONALE (H): Primary | Chronic | ICD-10-CM

## 2022-10-20 LAB
ALBUMIN SERPL-MCNC: 3.2 G/DL (ref 3.5–5)
ALP SERPL-CCNC: 212 U/L (ref 45–120)
ALT SERPL W P-5'-P-CCNC: 180 U/L (ref 0–45)
ANION GAP SERPL CALCULATED.3IONS-SCNC: 11 MMOL/L (ref 5–18)
APTT PPP: 30 SECONDS (ref 22–38)
AST SERPL W P-5'-P-CCNC: 88 U/L (ref 0–40)
BASOPHILS # BLD AUTO: 0.1 10E3/UL (ref 0–0.2)
BASOPHILS NFR BLD AUTO: 1 %
BILIRUB SERPL-MCNC: 1.4 MG/DL (ref 0–1)
BNP SERPL-MCNC: 3001 PG/ML (ref 0–93)
BUN SERPL-MCNC: 25 MG/DL (ref 8–22)
CALCIUM SERPL-MCNC: 9 MG/DL (ref 8.5–10.5)
CHLORIDE BLD-SCNC: 110 MMOL/L (ref 98–107)
CO2 SERPL-SCNC: 16 MMOL/L (ref 22–31)
CREAT SERPL-MCNC: 0.96 MG/DL (ref 0.6–1.1)
EOSINOPHIL # BLD AUTO: 0 10E3/UL (ref 0–0.7)
EOSINOPHIL NFR BLD AUTO: 0 %
ERYTHROCYTE [DISTWIDTH] IN BLOOD BY AUTOMATED COUNT: 16.8 % (ref 10–15)
GFR SERPL CREATININE-BSD FRML MDRD: 67 ML/MIN/1.73M2
GLUCOSE BLD-MCNC: 98 MG/DL (ref 70–125)
HCT VFR BLD AUTO: 43.5 % (ref 35–47)
HGB BLD-MCNC: 14 G/DL (ref 11.7–15.7)
HOLD SPECIMEN: NORMAL
IMM GRANULOCYTES # BLD: 0 10E3/UL
IMM GRANULOCYTES NFR BLD: 1 %
INR PPP: 1.49 (ref 0.85–1.15)
LYMPHOCYTES # BLD AUTO: 1.5 10E3/UL (ref 0.8–5.3)
LYMPHOCYTES NFR BLD AUTO: 19 %
MAGNESIUM SERPL-MCNC: 1.9 MG/DL (ref 1.8–2.6)
MCH RBC QN AUTO: 28.2 PG (ref 26.5–33)
MCHC RBC AUTO-ENTMCNC: 32.2 G/DL (ref 31.5–36.5)
MCV RBC AUTO: 88 FL (ref 78–100)
MONOCYTES # BLD AUTO: 1 10E3/UL (ref 0–1.3)
MONOCYTES NFR BLD AUTO: 13 %
NEUTROPHILS # BLD AUTO: 5.4 10E3/UL (ref 1.6–8.3)
NEUTROPHILS NFR BLD AUTO: 66 %
NRBC # BLD AUTO: 0 10E3/UL
NRBC BLD AUTO-RTO: 0 /100
PLATELET # BLD AUTO: 184 10E3/UL (ref 150–450)
POTASSIUM BLD-SCNC: 4.6 MMOL/L (ref 3.5–5)
PROT SERPL-MCNC: 6.1 G/DL (ref 6–8)
RADIOLOGIST FLAGS: ABNORMAL
RBC # BLD AUTO: 4.97 10E6/UL (ref 3.8–5.2)
SARS-COV-2 RNA RESP QL NAA+PROBE: NEGATIVE
SODIUM SERPL-SCNC: 137 MMOL/L (ref 136–145)
TROPONIN I SERPL-MCNC: 0.03 NG/ML (ref 0–0.29)
TSH SERPL DL<=0.005 MIU/L-ACNC: 0.08 UIU/ML (ref 0.3–5)
WBC # BLD AUTO: 8.1 10E3/UL (ref 4–11)

## 2022-10-20 PROCEDURE — 99223 1ST HOSP IP/OBS HIGH 75: CPT | Mod: AI | Performed by: INTERNAL MEDICINE

## 2022-10-20 PROCEDURE — 250N000009 HC RX 250: Performed by: EMERGENCY MEDICINE

## 2022-10-20 PROCEDURE — 84484 ASSAY OF TROPONIN QUANT: CPT | Performed by: EMERGENCY MEDICINE

## 2022-10-20 PROCEDURE — 85025 COMPLETE CBC W/AUTO DIFF WBC: CPT | Performed by: EMERGENCY MEDICINE

## 2022-10-20 PROCEDURE — 250N000013 HC RX MED GY IP 250 OP 250 PS 637: Performed by: INTERNAL MEDICINE

## 2022-10-20 PROCEDURE — 96367 TX/PROPH/DG ADDL SEQ IV INF: CPT

## 2022-10-20 PROCEDURE — 80053 COMPREHEN METABOLIC PANEL: CPT | Performed by: EMERGENCY MEDICINE

## 2022-10-20 PROCEDURE — 93005 ELECTROCARDIOGRAM TRACING: CPT | Performed by: EMERGENCY MEDICINE

## 2022-10-20 PROCEDURE — 83880 ASSAY OF NATRIURETIC PEPTIDE: CPT | Performed by: EMERGENCY MEDICINE

## 2022-10-20 PROCEDURE — 210N000002 HC R&B HEART CARE

## 2022-10-20 PROCEDURE — 250N000011 HC RX IP 250 OP 636: Performed by: INTERNAL MEDICINE

## 2022-10-20 PROCEDURE — 96375 TX/PRO/DX INJ NEW DRUG ADDON: CPT

## 2022-10-20 PROCEDURE — 36415 COLL VENOUS BLD VENIPUNCTURE: CPT | Performed by: EMERGENCY MEDICINE

## 2022-10-20 PROCEDURE — 83735 ASSAY OF MAGNESIUM: CPT | Performed by: EMERGENCY MEDICINE

## 2022-10-20 PROCEDURE — 96376 TX/PRO/DX INJ SAME DRUG ADON: CPT

## 2022-10-20 PROCEDURE — C9803 HOPD COVID-19 SPEC COLLECT: HCPCS

## 2022-10-20 PROCEDURE — U0003 INFECTIOUS AGENT DETECTION BY NUCLEIC ACID (DNA OR RNA); SEVERE ACUTE RESPIRATORY SYNDROME CORONAVIRUS 2 (SARS-COV-2) (CORONAVIRUS DISEASE [COVID-19]), AMPLIFIED PROBE TECHNIQUE, MAKING USE OF HIGH THROUGHPUT TECHNOLOGIES AS DESCRIBED BY CMS-2020-01-R: HCPCS | Performed by: EMERGENCY MEDICINE

## 2022-10-20 PROCEDURE — 96365 THER/PROPH/DIAG IV INF INIT: CPT

## 2022-10-20 PROCEDURE — 85610 PROTHROMBIN TIME: CPT | Performed by: EMERGENCY MEDICINE

## 2022-10-20 PROCEDURE — 84439 ASSAY OF FREE THYROXINE: CPT | Performed by: EMERGENCY MEDICINE

## 2022-10-20 PROCEDURE — 84443 ASSAY THYROID STIM HORMONE: CPT | Performed by: EMERGENCY MEDICINE

## 2022-10-20 PROCEDURE — 99285 EMERGENCY DEPT VISIT HI MDM: CPT | Mod: 25

## 2022-10-20 PROCEDURE — 71275 CT ANGIOGRAPHY CHEST: CPT

## 2022-10-20 PROCEDURE — 250N000011 HC RX IP 250 OP 636: Performed by: EMERGENCY MEDICINE

## 2022-10-20 PROCEDURE — 85730 THROMBOPLASTIN TIME PARTIAL: CPT | Performed by: EMERGENCY MEDICINE

## 2022-10-20 PROCEDURE — 258N000003 HC RX IP 258 OP 636: Performed by: INTERNAL MEDICINE

## 2022-10-20 PROCEDURE — 84481 FREE ASSAY (FT-3): CPT | Performed by: EMERGENCY MEDICINE

## 2022-10-20 RX ORDER — HEPARIN SODIUM 10000 [USP'U]/100ML
0-5000 INJECTION, SOLUTION INTRAVENOUS CONTINUOUS
Status: DISPENSED | OUTPATIENT
Start: 2022-10-20 | End: 2022-10-22

## 2022-10-20 RX ORDER — IOPAMIDOL 755 MG/ML
75 INJECTION, SOLUTION INTRAVASCULAR ONCE
Status: COMPLETED | OUTPATIENT
Start: 2022-10-20 | End: 2022-10-20

## 2022-10-20 RX ORDER — LIDOCAINE 40 MG/G
CREAM TOPICAL
Status: DISCONTINUED | OUTPATIENT
Start: 2022-10-20 | End: 2022-10-23 | Stop reason: HOSPADM

## 2022-10-20 RX ORDER — METHIMAZOLE 5 MG/1
10 TABLET ORAL DAILY
Status: ON HOLD | COMMUNITY
End: 2023-03-06

## 2022-10-20 RX ORDER — FUROSEMIDE 10 MG/ML
10 INJECTION INTRAMUSCULAR; INTRAVENOUS EVERY 12 HOURS
Status: DISCONTINUED | OUTPATIENT
Start: 2022-10-21 | End: 2022-10-23 | Stop reason: HOSPADM

## 2022-10-20 RX ORDER — ACETAMINOPHEN 325 MG/1
325 TABLET ORAL EVERY 6 HOURS PRN
Status: DISCONTINUED | OUTPATIENT
Start: 2022-10-20 | End: 2022-10-20

## 2022-10-20 RX ORDER — METOPROLOL TARTRATE 1 MG/ML
5 INJECTION, SOLUTION INTRAVENOUS ONCE
Status: COMPLETED | OUTPATIENT
Start: 2022-10-20 | End: 2022-10-20

## 2022-10-20 RX ORDER — ACETAMINOPHEN 325 MG/1
325 TABLET ORAL EVERY 6 HOURS PRN
Status: DISCONTINUED | OUTPATIENT
Start: 2022-10-20 | End: 2022-10-23 | Stop reason: HOSPADM

## 2022-10-20 RX ORDER — ACETAMINOPHEN 650 MG/1
650 SUPPOSITORY RECTAL EVERY 6 HOURS PRN
Status: DISCONTINUED | OUTPATIENT
Start: 2022-10-20 | End: 2022-10-20

## 2022-10-20 RX ORDER — METHIMAZOLE 5 MG/1
20 TABLET ORAL EVERY 6 HOURS
Status: DISCONTINUED | OUTPATIENT
Start: 2022-10-20 | End: 2022-10-21

## 2022-10-20 RX ORDER — METHIMAZOLE 5 MG/1
20 TABLET ORAL EVERY 6 HOURS
Status: DISCONTINUED | OUTPATIENT
Start: 2022-10-20 | End: 2022-10-20

## 2022-10-20 RX ORDER — PANTOPRAZOLE SODIUM 20 MG/1
40 TABLET, DELAYED RELEASE ORAL
Status: DISCONTINUED | OUTPATIENT
Start: 2022-10-20 | End: 2022-10-23 | Stop reason: HOSPADM

## 2022-10-20 RX ORDER — LORAZEPAM 2 MG/ML
0.25 INJECTION INTRAMUSCULAR EVERY 6 HOURS PRN
Status: DISCONTINUED | OUTPATIENT
Start: 2022-10-20 | End: 2022-10-23 | Stop reason: HOSPADM

## 2022-10-20 RX ORDER — FUROSEMIDE 10 MG/ML
40 INJECTION INTRAMUSCULAR; INTRAVENOUS ONCE
Status: COMPLETED | OUTPATIENT
Start: 2022-10-20 | End: 2022-10-20

## 2022-10-20 RX ADMIN — IOPAMIDOL 75 ML: 755 INJECTION, SOLUTION INTRAVENOUS at 19:53

## 2022-10-20 RX ADMIN — SODIUM CHLORIDE 300 MG: 9 INJECTION, SOLUTION INTRAVENOUS at 23:07

## 2022-10-20 RX ADMIN — PANTOPRAZOLE SODIUM 40 MG: 20 TABLET, DELAYED RELEASE ORAL at 23:00

## 2022-10-20 RX ADMIN — FUROSEMIDE 40 MG: 10 INJECTION, SOLUTION INTRAVENOUS at 20:20

## 2022-10-20 RX ADMIN — METOPROLOL TARTRATE 5 MG: 5 INJECTION INTRAVENOUS at 22:40

## 2022-10-20 RX ADMIN — METHIMAZOLE 20 MG: 5 TABLET ORAL at 23:00

## 2022-10-20 RX ADMIN — HEPARIN SODIUM 1250 UNITS/HR: 10000 INJECTION, SOLUTION INTRAVENOUS at 21:01

## 2022-10-20 ASSESSMENT — ENCOUNTER SYMPTOMS
SHORTNESS OF BREATH: 1
COUGH: 0
DYSURIA: 0
WEAKNESS: 1
HEADACHES: 0
CHILLS: 0
PALPITATIONS: 1
VOMITING: 0
ABDOMINAL PAIN: 0
FATIGUE: 0
FEVER: 0

## 2022-10-20 ASSESSMENT — ACTIVITIES OF DAILY LIVING (ADL)
ADLS_ACUITY_SCORE: 37

## 2022-10-20 NOTE — ED PROVIDER NOTES
EMERGENCY DEPARTMENT ENCOUNTER      NAME: Kallie Barker  AGE: 62 year old female  YOB: 1960  MRN: [id number]  EVALUATION DATE & TIME: 10/20/2022  6:28 PM    PCP: Waqas Guthrie    ED PROVIDER: Esperanza Alberts DO      Chief Complaint   Patient presents with     Generalized Weakness     Shortness of Breath         FINAL IMPRESSION:  1. Atrial fibrillation with RVR (H)    2. Acute on chronic congestive heart failure, unspecified heart failure type (H)    3. Other acute pulmonary embolism, unspecified whether acute cor pulmonale present (H)    4. Hyperthyroidism    5. Elevated LFTs          ED COURSE & MEDICAL DECISION MAKING:    Pertinent Labs & Imaging studies reviewed. (See chart for details)  6:32 PM I met the patient and performed my initial interview and exam.  7:12 PM All lab resulted  7:15 PM Patient is likely to be admitted  8:15 PM I spoke with Radiology and the patient has a pulmonary embolism on her left pulmonary artery.  8:18 PM I rechecked the patient and updated them on laboratory and imaging results.  8:22 PM The patient says she's been off her blood thinners for over a year, and longer for her heart medications.   8:44 PM I spoke with Dr. Beck. He recommends heparin and transfer to [facility] if patient is hemodynamically unstable.   8:48 PM I rechecked the patient and updated them. She declines transfer anywhere.   9:11 PM I spoke with Hospitalist Dr. Ruiz and they recommend talking with cardiology to discuss heart rate control.   9:28 PM I spoke with Dr. Correa and he recommends starting with low dose IV metoprolol then transitioning to oral.   10:00 PM Dr. Ruiz requests an ICU bed for the patient as she has the potential to decline.  He reports he will manage the patient and does not request intensivist consultation.    62 year old female presents to the Emergency Department for evaluation of shortness of breath, generalized weakness.  She notes symptoms over the past  2 weeks.  She feels that she has been in and out of atrial fibrillation for at least this amount of time.  Per chart review she does have a history of pulmonary embolism, cardiomyopathy, atrial fibrillation.  She had previously been on Xarelto, metoprolol and digoxin but admits that she has not been compliant these medications.  She continues to smoke.  No fevers or chills.  She is very short of breath.  She denies any persistent alcohol use.  No leg swelling.  She is quite dyspneic on exam.  Her EKG shows atrial fibrillation with rapid ventricular response.  Troponin negative.  She is taking her methimazole however it sounds like she has not had her thyroid function tested in some time.  No fevers or chills.  She is not confused.  Troponin is negative.  She does have elevation in her LFTs, no right upper quadrant pain on exam.  CT PE does show a large amount of pulmonary embolism, most in the left main pulmonary artery causing 70% stenosis and there is reflux seen in the IVC and hepatic veins worrisome for right heart strain.  I ordered IV Lasix.  Heparin infusion initiated.  I did discuss with interventional radiology on-call.  At this time would not recommend thrombectomy, however if patient became hemodynamically unstable would recommend transfer to [facility].  PESI score is 90 points, putting patient in class III, intermediate risk for 30 day mortality.  Patient and  updated at bedside.  Remains overall well-appearing with oxygen saturations in the high 90s,, remains tachycardic with her atrial fibrillation.  She is normotensive.  She declines transfer and will plan to admit here.  TSH low at 0.08, pending free T4.  I did discuss with admitting physician who recommends we discuss with cardiology about rate control.  Cardiology recommends low-dose IV metoprolol and then can transition to oral metoprolol.  Will need an echocardiogram.    At the conclusion of the encounter I discussed the results of all of  the tests and the disposition. The questions were answered. The patient or family acknowledged understanding and was agreeable with the care plan.     Medical Decision Making    Supplemental history from: Family Member    External Record(s) Reviewed: Inpatient Record and Outpatient Record    Differential Diagnosis: See MDM charting for differential considered.     I performed an independent interpretation of the: EKG: See my documentation.    Discussed with radiology regarding test interpretation: CT Results    Discussion of management with another provider: Cardiology, Hospitalist and Radiology/Interventional Radiology    The following testing was considered but ultimately not selected: None     I considered prescription management with: N/A    The patient's care impacted: Coronary Heart Disease    Consideration of Admission/Observation: Patient admitted    Care significantly affected by Social Determinants of Health including: N/A         Critical Care     Performed by: Dr Beverley Alberts  Authorized by: Dr Beverley Alberts  Total critical care time: 45 minutes  Critical care was necessary to treat or prevent imminent or life-threatening deterioration of the following conditions: Pulmonary embolism, atrial fibrillation with RVR, acute CHF exacerbation  Critical care was time spent personally by me on the following activities: development of treatment plan with patient or surrogate, discussions with consultants, examination of patient, evaluation of patient's response to treatment, obtaining history from patient or surrogate, ordering and performing treatments and interventions, ordering and review of laboratory studies, ordering and review of radiographic studies, re-evaluation of patient's condition and monitoring for potential decompensation.  Critical care time was exclusive of separately billable procedures and treating other patients.    MEDICATIONS GIVEN IN THE EMERGENCY:  Medications   heparin 25,000 units in  "0.45% NaCl 250 mL ANTICOAGULANT infusion (1,250 Units/hr Intravenous New Bag 10/20/22 2101)   metoprolol (LOPRESSOR) injection 5 mg (has no administration in time range)   iopamidol (ISOVUE-370) solution 75 mL (75 mLs Intravenous Given 10/20/22 1953)   furosemide (LASIX) injection 40 mg (40 mg Intravenous Given 10/20/22 2020)   heparin ANTICOAGULANT Loading dose for HIGH INTENSITY TREATMENT * Give BEFORE starting heparin infusion (5,600 Units Intravenous Given 10/20/22 2101)       NEW PRESCRIPTIONS STARTED AT TODAY'S ER VISIT  New Prescriptions    No medications on file          =================================================================    HPI    Patient information was obtained from: Patient     Use of : N/A         Kallie Barker is a 62 year old female with a pertinent history of tobacco use, hyperthyroidism, persistent atrial fibrillation and graves disease who presents to this ED via walk in for evaluation of generalized weakness and shortness of breath.    The patient has been experiencing generalized weakness for a couple of weeks now. She says she also feels short of breath and can't make any movements without feeling \"lousy\". She also noted that she's been having palpitations for the last couple of weeks as well and says she can feel herself going in and out of atrial fibrillation. She is currently taking methimazole for her thyroid, but has not gotten her thyroid levels checked in years. Patient notes that she was prescribed blood thinners, but has not taken them. Patient was short of breath and exhausted upon sitting up to remove her sweater in ED room.     Patient denies any chest pain, fever, chills, cough, vomiting, diarrhea or any urinary symptoms. She says she had a cardioversion done in the past for her atrial fibrillation. She notes having a history of blood clots. She denies any alcohol use, but says she smokes and last smoked cigarettes 3 weeks ago.        REVIEW OF SYSTEMS " "  Review of Systems   Constitutional: Negative for chills, fatigue and fever.   Respiratory: Positive for shortness of breath. Negative for cough.    Cardiovascular: Positive for palpitations. Negative for chest pain.   Gastrointestinal: Negative for abdominal pain and vomiting.   Genitourinary: Negative for dysuria.   Skin: Negative.    Neurological: Positive for weakness. Negative for syncope and headaches.   All other systems reviewed and are negative.      PAST MEDICAL HISTORY:  No past medical history on file.    PAST SURGICAL HISTORY:  Past Surgical History:   Procedure Laterality Date     APPENDECTOMY       CARDIOVERSION  2018     PICC  3/13/2018                CURRENT MEDICATIONS:    methimazole (TAPAZOLE) 5 MG tablet  multivitamin therapeutic tablet  XARELTO 20 mg Tab         ALLERGIES:  No Known Allergies    FAMILY HISTORY:  No family history on file.    SOCIAL HISTORY:   Social History     Socioeconomic History     Marital status:    Tobacco Use     Smoking status: Former     Packs/day: 1.00     Years: 30.00     Pack years: 30.00     Types: Cigarettes     Quit date: 2018     Years since quittin.6     Smokeless tobacco: Never   Substance and Sexual Activity     Alcohol use: No     Drug use: No       VITALS:  BP (!) 158/95   Pulse (!) 136   Temp 97.4  F (36.3  C) (Temporal)   Resp 25   Ht 1.626 m (5' 4\")   Wt 70.3 kg (155 lb)   SpO2 98%   BMI 26.61 kg/m      PHYSICAL EXAM    Physical Exam  Constitutional:       General: She is not in acute distress.  HENT:      Head: Normocephalic and atraumatic.      Mouth/Throat:      Pharynx: Oropharynx is clear.   Eyes:      Pupils: Pupils are equal, round, and reactive to light.   Cardiovascular:      Rate and Rhythm: Tachycardia present. Rhythm irregularly irregular.      Pulses: Normal pulses.      Heart sounds: Normal heart sounds.   Pulmonary:      Effort: Pulmonary effort is normal.      Breath sounds: Normal breath sounds.      " Comments: Conversational dyspnea.   Abdominal:      General: Abdomen is flat. Bowel sounds are normal.      Palpations: Abdomen is soft.      Tenderness: There is no abdominal tenderness.   Musculoskeletal:         General: Normal range of motion.   Skin:     General: Skin is warm and dry.      Capillary Refill: Capillary refill takes less than 2 seconds.   Neurological:      General: No focal deficit present.      Mental Status: She is alert and oriented to person, place, and time.        LAB:  All pertinent labs reviewed and interpreted.  Labs Ordered and Resulted from Time of ED Arrival to Time of ED Departure   INR - Abnormal       Result Value    INR 1.49 (*)    COMPREHENSIVE METABOLIC PANEL - Abnormal    Sodium 137      Potassium 4.6      Chloride 110 (*)     Carbon Dioxide (CO2) 16 (*)     Anion Gap 11      Urea Nitrogen 25 (*)     Creatinine 0.96      Calcium 9.0      Glucose 98      Alkaline Phosphatase 212 (*)     AST 88 (*)      (*)     Protein Total 6.1      Albumin 3.2 (*)     Bilirubin Total 1.4 (*)     GFR Estimate 67     B-TYPE NATRIURETIC PEPTIDE ([facility] ONLY) - Abnormal    BNP 3,001 (*)    CBC WITH PLATELETS AND DIFFERENTIAL - Abnormal    WBC Count 8.1      RBC Count 4.97      Hemoglobin 14.0      Hematocrit 43.5      MCV 88      MCH 28.2      MCHC 32.2      RDW 16.8 (*)     Platelet Count 184      % Neutrophils 66      % Lymphocytes 19      % Monocytes 13      % Eosinophils 0      % Basophils 1      % Immature Granulocytes 1      NRBCs per 100 WBC 0      Absolute Neutrophils 5.4      Absolute Lymphocytes 1.5      Absolute Monocytes 1.0      Absolute Eosinophils 0.0      Absolute Basophils 0.1      Absolute Immature Granulocytes 0.0      Absolute NRBCs 0.0     TSH WITH FREE T4 REFLEX - Abnormal    TSH 0.08 (*)    PARTIAL THROMBOPLASTIN TIME - Normal    aPTT 30     TROPONIN I - Normal    Troponin I 0.03     MAGNESIUM - Normal    Magnesium 1.9     HEPARIN UNFRACTIONATED ANTI XA LEVEL   T4  FREE   COVID-19 VIRUS (CORONAVIRUS) BY PCR   T3 FREE       RADIOLOGY:  Reviewed all pertinent imaging. Please see official radiology report.  CT Chest Pulmonary Embolism w Contrast   Final Result   Abnormal   IMPRESSION:   1.  Significant PE, most marked in the left main pulmonary artery with some secondary findings of right heart strain. Please refer to above report for details.      2.  Minute pericardial effusion.      3.  Mild ascites left upper quadrant.         [Urgent Result: Pulmonary emboli.]      Finding was identified on 10/20/2022 8:05 PM.       Dr. Alberts was contacted by me on 10/20/2022 8:18 PM and verbalized understanding of the critical result.             EKG:    Performed at: 10/20/2022 18:35     Impression: Atrial fibrillation with rapid ventricular response with premature ventricular or aberrantly conducted complexes, right bundle branch block, abnormal ECG.     Rate: 135 BPM  Rhythm: Atrial fibrillation with rapid ventricular response with premature ventricular or aberrantly conducted complexes  Axis: 68  IL Interval: N/A  QRS Interval: 138 ms  QTc Interval: 564 ms  ST Changes: N/A  Comparison: 5/4/2018 14:08     I have independently reviewed and interpreted the EKG(s) documented above.      I, Ramana Thompson, am serving as a scribe to document services personally performed by Dr. Esperanza Alberts based on my observation and the provider's statements to me. IEsperanza, DO attest that Ramana Thompson is acting in a scribe capacity, has observed my performance of the services and has documented them in accordance with my direction.    Esperanza Alberts DO  Emergency Medicine  [facility] EMERGENCY ROOM  [address] [id number] [phone]  Dept: [phone]     Esperanza Alberts DO  10/20/22 2147       Esperanza Alberts DO  10/20/22 2209       Esperanza Alberts DO  10/20/22 2226

## 2022-10-20 NOTE — ED TRIAGE NOTES
Pt presents to the ED with c/o worsening weakness and SOB for the past 3 weeks. Pt hx of graves, HF, A-fib, and PE's.

## 2022-10-21 ENCOUNTER — APPOINTMENT (OUTPATIENT)
Dept: CARDIOLOGY | Facility: CLINIC | Age: 62
End: 2022-10-21
Attending: INTERNAL MEDICINE
Payer: COMMERCIAL

## 2022-10-21 ENCOUNTER — APPOINTMENT (OUTPATIENT)
Dept: ULTRASOUND IMAGING | Facility: CLINIC | Age: 62
End: 2022-10-21
Attending: INTERNAL MEDICINE
Payer: COMMERCIAL

## 2022-10-21 PROBLEM — I48.0 PAROXYSMAL ATRIAL FIBRILLATION (H): Status: ACTIVE | Noted: 2022-10-20

## 2022-10-21 PROBLEM — I48.91 ATRIAL FIBRILLATION WITH RVR (H): Chronic | Status: ACTIVE | Noted: 2022-10-20

## 2022-10-21 PROBLEM — R93.1 DECREASED CARDIAC EJECTION FRACTION: Status: ACTIVE | Noted: 2022-10-21

## 2022-10-21 PROBLEM — I50.9 ACUTE ON CHRONIC CONGESTIVE HEART FAILURE, UNSPECIFIED HEART FAILURE TYPE (H): Chronic | Status: ACTIVE | Noted: 2022-10-20

## 2022-10-21 PROBLEM — E05.90 HYPERTHYROIDISM: Chronic | Status: ACTIVE | Noted: 2022-10-20

## 2022-10-21 PROBLEM — I26.09 ACUTE PULMONARY EMBOLISM WITH ACUTE COR PULMONALE (H): Status: ACTIVE | Noted: 2022-10-20

## 2022-10-21 PROBLEM — I34.0 NONRHEUMATIC MITRAL VALVE REGURGITATION: Status: ACTIVE | Noted: 2022-10-21

## 2022-10-21 PROBLEM — R93.1 DECREASED CARDIAC EJECTION FRACTION: Chronic | Status: ACTIVE | Noted: 2022-10-21

## 2022-10-21 PROBLEM — I26.09 ACUTE PULMONARY EMBOLISM WITH ACUTE COR PULMONALE (H): Chronic | Status: ACTIVE | Noted: 2022-10-20

## 2022-10-21 PROBLEM — I48.0 PAROXYSMAL ATRIAL FIBRILLATION (H): Chronic | Status: ACTIVE | Noted: 2022-10-20

## 2022-10-21 LAB
ALBUMIN SERPL-MCNC: 3.3 G/DL (ref 3.5–5)
ALBUMIN UR-MCNC: NEGATIVE MG/DL
ALP SERPL-CCNC: 206 U/L (ref 45–120)
ALT SERPL W P-5'-P-CCNC: 171 U/L (ref 0–45)
ANION GAP SERPL CALCULATED.3IONS-SCNC: 12 MMOL/L (ref 5–18)
APPEARANCE UR: CLEAR
AST SERPL W P-5'-P-CCNC: 79 U/L (ref 0–40)
ATRIAL RATE - MUSE: 144 BPM
BASOPHILS # BLD AUTO: 0.1 10E3/UL (ref 0–0.2)
BASOPHILS NFR BLD AUTO: 1 %
BILIRUB SERPL-MCNC: 1.6 MG/DL (ref 0–1)
BILIRUB UR QL STRIP: NEGATIVE
BUN SERPL-MCNC: 28 MG/DL (ref 8–22)
CALCIUM SERPL-MCNC: 8.7 MG/DL (ref 8.5–10.5)
CHLORIDE BLD-SCNC: 107 MMOL/L (ref 98–107)
CO2 SERPL-SCNC: 17 MMOL/L (ref 22–31)
COLOR UR AUTO: COLORLESS
CREAT SERPL-MCNC: 1.01 MG/DL (ref 0.6–1.1)
DIASTOLIC BLOOD PRESSURE - MUSE: NORMAL MMHG
EOSINOPHIL # BLD AUTO: 0 10E3/UL (ref 0–0.7)
EOSINOPHIL NFR BLD AUTO: 0 %
ERYTHROCYTE [DISTWIDTH] IN BLOOD BY AUTOMATED COUNT: 16.5 % (ref 10–15)
GFR SERPL CREATININE-BSD FRML MDRD: 63 ML/MIN/1.73M2
GLUCOSE BLD-MCNC: 150 MG/DL (ref 70–125)
GLUCOSE UR STRIP-MCNC: NEGATIVE MG/DL
HAV IGM SERPL QL IA: NONREACTIVE
HBV CORE IGM SERPL QL IA: NONREACTIVE
HBV SURFACE AG SERPL QL IA: NONREACTIVE
HCT VFR BLD AUTO: 41 % (ref 35–47)
HCV AB SERPL QL IA: NONREACTIVE
HGB BLD-MCNC: 13.5 G/DL (ref 11.7–15.7)
HGB UR QL STRIP: NEGATIVE
IMM GRANULOCYTES # BLD: 0 10E3/UL
IMM GRANULOCYTES NFR BLD: 0 %
INTERPRETATION ECG - MUSE: NORMAL
KETONES UR STRIP-MCNC: NEGATIVE MG/DL
LEUKOCYTE ESTERASE UR QL STRIP: NEGATIVE
LVEF ECHO: NORMAL
LYMPHOCYTES # BLD AUTO: 0.9 10E3/UL (ref 0.8–5.3)
LYMPHOCYTES NFR BLD AUTO: 12 %
MCH RBC QN AUTO: 28.4 PG (ref 26.5–33)
MCHC RBC AUTO-ENTMCNC: 32.9 G/DL (ref 31.5–36.5)
MCV RBC AUTO: 86 FL (ref 78–100)
MONOCYTES # BLD AUTO: 0.4 10E3/UL (ref 0–1.3)
MONOCYTES NFR BLD AUTO: 5 %
NEUTROPHILS # BLD AUTO: 5.9 10E3/UL (ref 1.6–8.3)
NEUTROPHILS NFR BLD AUTO: 82 %
NITRATE UR QL: NEGATIVE
NRBC # BLD AUTO: 0 10E3/UL
NRBC BLD AUTO-RTO: 1 /100
P AXIS - MUSE: NORMAL DEGREES
PH UR STRIP: 5 [PH] (ref 5–7)
PLATELET # BLD AUTO: 172 10E3/UL (ref 150–450)
POTASSIUM BLD-SCNC: 4.1 MMOL/L (ref 3.5–5)
PR INTERVAL - MUSE: NORMAL MS
PROCALCITONIN SERPL-MCNC: 0.73 NG/ML (ref 0–0.49)
PROT SERPL-MCNC: 5.9 G/DL (ref 6–8)
QRS DURATION - MUSE: 138 MS
QT - MUSE: 376 MS
QTC - MUSE: 564 MS
R AXIS - MUSE: 68 DEGREES
RBC # BLD AUTO: 4.76 10E6/UL (ref 3.8–5.2)
RBC URINE: 2 /HPF
SODIUM SERPL-SCNC: 136 MMOL/L (ref 136–145)
SP GR UR STRIP: 1.01 (ref 1–1.03)
SYSTOLIC BLOOD PRESSURE - MUSE: NORMAL MMHG
T AXIS - MUSE: -27 DEGREES
T3FREE SERPL-MCNC: 1.5 PG/ML (ref 2–4.4)
T4 FREE SERPL-MCNC: 1.24 NG/DL (ref 0.9–1.7)
TROPONIN I SERPL-MCNC: 0.03 NG/ML (ref 0–0.29)
UFH PPP CHRO-ACNC: 0.43 IU/ML
UFH PPP CHRO-ACNC: 0.6 IU/ML
UFH PPP CHRO-ACNC: 0.98 IU/ML
UROBILINOGEN UR STRIP-MCNC: <2 MG/DL
VENTRICULAR RATE- MUSE: 135 BPM
WBC # BLD AUTO: 7.3 10E3/UL (ref 4–11)
WBC URINE: 0 /HPF

## 2022-10-21 PROCEDURE — 99232 SBSQ HOSP IP/OBS MODERATE 35: CPT | Performed by: INTERNAL MEDICINE

## 2022-10-21 PROCEDURE — 85520 HEPARIN ASSAY: CPT | Performed by: EMERGENCY MEDICINE

## 2022-10-21 PROCEDURE — 84145 PROCALCITONIN (PCT): CPT | Performed by: INTERNAL MEDICINE

## 2022-10-21 PROCEDURE — 80053 COMPREHEN METABOLIC PANEL: CPT | Performed by: INTERNAL MEDICINE

## 2022-10-21 PROCEDURE — 36415 COLL VENOUS BLD VENIPUNCTURE: CPT | Performed by: INTERNAL MEDICINE

## 2022-10-21 PROCEDURE — 85014 HEMATOCRIT: CPT | Performed by: INTERNAL MEDICINE

## 2022-10-21 PROCEDURE — 250N000013 HC RX MED GY IP 250 OP 250 PS 637: Performed by: INTERNAL MEDICINE

## 2022-10-21 PROCEDURE — 84484 ASSAY OF TROPONIN QUANT: CPT | Performed by: INTERNAL MEDICINE

## 2022-10-21 PROCEDURE — 250N000011 HC RX IP 250 OP 636: Performed by: INTERNAL MEDICINE

## 2022-10-21 PROCEDURE — 93306 TTE W/DOPPLER COMPLETE: CPT

## 2022-10-21 PROCEDURE — 258N000003 HC RX IP 258 OP 636: Performed by: INTERNAL MEDICINE

## 2022-10-21 PROCEDURE — 80074 ACUTE HEPATITIS PANEL: CPT | Performed by: INTERNAL MEDICINE

## 2022-10-21 PROCEDURE — 93970 EXTREMITY STUDY: CPT

## 2022-10-21 PROCEDURE — 85520 HEPARIN ASSAY: CPT | Performed by: INTERNAL MEDICINE

## 2022-10-21 PROCEDURE — 93306 TTE W/DOPPLER COMPLETE: CPT | Mod: 26 | Performed by: INTERNAL MEDICINE

## 2022-10-21 PROCEDURE — 81001 URINALYSIS AUTO W/SCOPE: CPT | Performed by: INTERNAL MEDICINE

## 2022-10-21 PROCEDURE — 99252 IP/OBS CONSLTJ NEW/EST SF 35: CPT | Performed by: INTERNAL MEDICINE

## 2022-10-21 PROCEDURE — 210N000002 HC R&B HEART CARE

## 2022-10-21 PROCEDURE — 250N000011 HC RX IP 250 OP 636: Performed by: EMERGENCY MEDICINE

## 2022-10-21 RX ORDER — METHIMAZOLE 5 MG/1
10 TABLET ORAL DAILY
Status: DISCONTINUED | OUTPATIENT
Start: 2022-10-22 | End: 2022-10-23 | Stop reason: HOSPADM

## 2022-10-21 RX ORDER — METOPROLOL TARTRATE 1 MG/ML
5 INJECTION, SOLUTION INTRAVENOUS EVERY 6 HOURS PRN
Status: DISCONTINUED | OUTPATIENT
Start: 2022-10-21 | End: 2022-10-23 | Stop reason: HOSPADM

## 2022-10-21 RX ADMIN — HEPARIN SODIUM 1050 UNITS/HR: 10000 INJECTION, SOLUTION INTRAVENOUS at 15:00

## 2022-10-21 RX ADMIN — POTASSIUM IODIDE 250 MG: 1 SOLUTION ORAL at 13:24

## 2022-10-21 RX ADMIN — METHIMAZOLE 20 MG: 5 TABLET ORAL at 04:37

## 2022-10-21 RX ADMIN — FUROSEMIDE 10 MG: 10 INJECTION, SOLUTION INTRAVENOUS at 18:25

## 2022-10-21 RX ADMIN — HYDROCORTISONE SODIUM SUCCINATE 100 MG: 100 INJECTION, POWDER, FOR SOLUTION INTRAMUSCULAR; INTRAVENOUS at 06:58

## 2022-10-21 RX ADMIN — MAGNESIUM SULFATE HEPTAHYDRATE 1 G: 500 INJECTION, SOLUTION INTRAMUSCULAR; INTRAVENOUS at 00:02

## 2022-10-21 RX ADMIN — POTASSIUM IODIDE 250 MG: 1 SOLUTION ORAL at 06:56

## 2022-10-21 RX ADMIN — Medication 1 MG: at 06:00

## 2022-10-21 RX ADMIN — ACETAMINOPHEN 325 MG: 325 TABLET, FILM COATED ORAL at 06:00

## 2022-10-21 RX ADMIN — POTASSIUM IODIDE 250 MG: 1 SOLUTION ORAL at 00:51

## 2022-10-21 RX ADMIN — METHIMAZOLE 20 MG: 5 TABLET ORAL at 10:53

## 2022-10-21 RX ADMIN — PANTOPRAZOLE SODIUM 40 MG: 20 TABLET, DELAYED RELEASE ORAL at 10:52

## 2022-10-21 RX ADMIN — FUROSEMIDE 10 MG: 10 INJECTION, SOLUTION INTRAVENOUS at 06:02

## 2022-10-21 ASSESSMENT — ACTIVITIES OF DAILY LIVING (ADL)
ADLS_ACUITY_SCORE: 37
ADLS_ACUITY_SCORE: 38
ADLS_ACUITY_SCORE: 37
ADLS_ACUITY_SCORE: 38
ADLS_ACUITY_SCORE: 37

## 2022-10-21 NOTE — PROGRESS NOTES
[facility] MEDICINE PROGRESS NOTE      Identification/Summary: Kallie Barker is a 62 year old female with a past medical history of hypothyroidism compliant on methimazole therapy; remote history of DVT and pulmonary emboli not compliant in taking anticoagulation she presented to the emergency room with rapid atrial fibrillation work-up showed a large pulmonary embolus on CT PE run in the left main pulmonary artery she was admitted to inpatient status but remains in the emergency room     She feels remarkably better since the heparin was started her heart rate has now normalized and she is indeed in sinus rhythm.  She understands the need for lifelong anticoagulation her cardiac status is stabilized she is on a low-dose of diuretic no chest pain.     Assessment and Plan:  Principal Problem:    Acute pulmonary embolism with acute cor pulmonale (H) (10/20/2022)  Active Problems:    History of pulmonary embolism ()    Paroxysmal atrial fibrillation (H) (10/20/2022)    Acute on chronic congestive heart failure, unspecified heart failure type (H) (10/20/2022)    Graves' disease (4/11/2012)    Hyperthyroidism (10/20/2022)    Plan: She is back in sinus rhythm almost asymptomatic we will continue the heparin infusion for least 48 hours and start her on beginning dose of Xarelto 15 mg twice a day for 21 days then 20 mg daily maintenance  Cut back on the methimazole to her baseline dose reviewed her history of Graves' not clear why she did not have ablation or surgery will need to clarify this  Continue same medications as otherwise ordered in the ER including IV Lasix for now for the right-sided heart failure will be able to switch her over to oral probably tomorrow  Probably home in a few days  Her son was updated with the situation when she goes home she will be staying with him and his wife        Clinically Significant Risk Factors Present on Admission              # Hypoalbuminemia: Lowest  "albumin = 3.2 g/dL (Ref range: 3.5-5.2) at 10/20/2022  6:48 PM, will monitor as appropriate  # Drug Induced Coagulation Defect: home medication list includes an anticoagulant medication     # Acute systolic heart failure: last echo with EF <40% and receiving IV diuretics    # Overweight: Estimated body mass index is 26.61 kg/m  as calculated from the following:    Height as of this encounter: 1.626 m (5' 4\").    Weight as of this encounter: 70.3 kg (155 lb).           Diet: Regular Diet Adult  DVT Prophylaxis:  DOAC/heparin infusion  Code Status: Full Code    Anticipated possible discharge in 3 days to  once milestones are met.  Disposition Plan      Expected Discharge Date: 10/22/2022                The patient's care was discussed with the Patient and Patient's Family.    Owen Huerta MD  Hospitalist  [facility]  [facility]  Phone: #[phone]    Interval History/Subjective:  She is feeling 100% better from her presentation in the ER last night rhythm is converted to sinus she is not short of breath tolerating the heparin infusion    Physical Exam/Objective:  Temp:  [97.4  F (36.3  C)-97.7  F (36.5  C)] 97.7  F (36.5  C)  Pulse:  [] 65  Resp:  [18-35] 23  BP: (108-183)/() 108/74  SpO2:  [90 %-98 %] 96 %  Body mass index is 26.61 kg/m .    Sinus rhythm with occasional PAC rate 70  Lungs clear  Heart regular rhythm      Data reviewed today: I personally reviewed all new medications, labs, imaging/diagnostics reports over the past 24 hours. Pertinent findings include:    Imaging:   Recent Results (from the past 24 hour(s))   CT Chest Pulmonary Embolism w Contrast   Result Value    Radiologist flags Pulmonary emboli. (Urgent)    Narrative    EXAM: CT CHEST PULMONARY EMBOLISM W CONTRAST  LOCATION: [facility]  DATE/TIME: 10/20/2022 7:57 PM    INDICATION: SOB, history of Afib  COMPARISON: 03/12/2018  TECHNIQUE: CT chest pulmonary angiogram " during arterial phase injection of IV contrast. Multiplanar reformats and MIP reconstructions were performed. Dose reduction techniques were used.   CONTRAST: ISOVUE 370 75 mL    FINDINGS:  ANGIOGRAM CHEST: There is significant PE seen in the left main pulmonary artery measuring approximately 5.7 cm x 2.1 cm in this involves the lateral periphery of the left main pulmonary artery and causes approximately 70% maximum stenosis involving the   left main pulmonary artery at the site of clot. There is a small area of clot seen involving the right interlobar pulmonary artery inferiorly measuring approximately 7 x 14 mm with no significant stenosis. There is a mild amount of clot seen involving   the proximal aspect of a left lower lobe segmental pulmonary artery anteriorly. No other PE is seen. There is significant reflux of contrast in the IVC and into the hepatic veins worrisome for right heart strain.Thoracic aorta is negative for dissection.    LUNGS AND PLEURA: There is minimal atelectasis seen primarily in the left upper lobe. No significant pleural effusions.    MEDIASTINUM/AXILLAE: Normal. There is a minute pericardial effusion.     CORONARY ARTERY CALCIFICATION: Mild.    UPPER ABDOMEN: Mild ascites in the left upper quadrant.    MUSCULOSKELETAL: Mild degenerative changes and a Schmorl's node in the thoracic spine.      Impression    IMPRESSION:  1.  Significant PE, most marked in the left main pulmonary artery with some secondary findings of right heart strain. Please refer to above report for details.    2.  Minute pericardial effusion.    3.  Mild ascites left upper quadrant.      [Urgent Result: Pulmonary emboli.]    Finding was identified on 10/20/2022 8:05 PM.     Dr. Alberts was contacted by me on 10/20/2022 8:18 PM and verbalized understanding of the critical result.     Echocardiogram Complete   Result Value    LVEF  35-40% (moderately reduced)    Narrative     [id number]  [id number]  [id number]  [id number]^ANNA^[place] [id number]     Name: DANIELA BOONE  MRN: [id number]  : 1960  Study Date: 10/21/2022 09:10 AM  Age: 62 yrs  Gender: Female  Patient Location: [facility]  Reason For Study: Pulmonary Emboli  Ordering Physician: JAMAAL CASTILLO  Performed By: WR     BSA: 1.8 m2  Height: 64 in  Weight: 155 lb  HR: 106  BP: 119/81 mmHg  ______________________________________________________________________________  Procedure  Complete Echo Adult.  ______________________________________________________________________________  Interpretation Summary     The left ventricle is borderline dilated.  Left ventricular function is decreased. The ejection fraction is 35-40%  (moderately reduced).  Mildly decreased right ventricular systolic function  The left atrium is severely dilated.  The right atrium is severely dilated.  The mitral leaflets appear thickened, hooded, and/or redundant, consistent  with myxomatous degeneration.  There is moderately severe (3+) mitral regurgitation.  There is moderate (2+) tricuspid regurgitation.  IVC diameter >2.1 cm collapsing <50% with sniff suggests a high RA pressure  estimated at 15 mmHg or greater.  There is mild (1+) aortic regurgitation.  Small pericardial effusion  ______________________________________________________________________________  Left Ventricle  The left ventricle is borderline dilated. Left ventricular function is  decreased. The ejection fraction is 35-40% (moderately reduced). There is  borderline eccentric left ventricular hypertrophy. Diastolic function not  assessed due to atrial fibrillation. There is moderate global hypokinesia of  the left ventricle.     Right Ventricle  The right ventricle is normal size. Mildly decreased right ventricular  systolic function.     Atria  The left atrium is severely dilated. The right atrium is severely dilated.  There is no color  Doppler evidence of an atrial shunt.     Mitral Valve  The mitral leaflets appear thickened, hooded, and/or redundant, consistent  with myxomatous degeneration. There is prolapse of the posterior mitral  leaflet. There is moderately severe (3+) mitral regurgitation.     Tricuspid Valve  Tricuspid valve leaflets appear normal. There is moderate (2+) tricuspid  regurgitation. The right ventricular systolic pressure is approximated at 19.1  mmHg plus the right atrial pressure. IVC diameter >2.1 cm collapsing <50% with  sniff suggests a high RA pressure estimated at 15 mmHg or greater.     Aortic Valve  The aortic valve is trileaflet. There is mild (1+) aortic regurgitation. No  hemodynamically significant valvular aortic stenosis.     Pulmonic Valve  The pulmonic valve is not well seen, but is grossly normal.     Vessels  Normal size ascending aorta.     Pericardium  Small pericardial effusion. There are no echocardiographic indications of  cardiac tamponade.     ______________________________________________________________________________  MMode/2D Measurements & Calculations  RVDd: 4.6 cm  IVSd: 1.1 cm  LVIDd: 5.3 cm  LVIDs: 4.7 cm  LVPWd: 1.1 cm  FS: 11.2 %     LV mass(C)d: 231.8 grams  LV mass(C)dI: 132.1 grams/m2  Ao root diam: 3.8 cm  LA dimension: 4.7 cm  asc Aorta Diam: 3.5 cm  LA/Ao: 1.2  LVOT diam: 2.3 cm  LVOT area: 4.1 cm2  LA Volume Indexed (AL/bp): 64.8 ml/m2  RWT: 0.43     Time Measurements  Aortic HR: 102.0 BPM  MM HR: 107.0 BPM     Doppler Measurements & Calculations  MV E max kathryn: 134.0 cm/sec  MV max P.1 mmHg  MV mean P.1 mmHg  MV V2 VTI: 18.0 cm  MVA(VTI): 2.9 cm2  MV dec slope: 1119 cm/sec2  MV dec time: 0.12 sec  Ao V2 max: 115.4 cm/sec  Ao max P.0 mmHg  Ao V2 mean: 71.5 cm/sec  Ao mean P.5 mmHg  Ao V2 VTI: 16.6 cm  JOSE(I,D): 3.1 cm2  JOSE(V,D): 2.6 cm2  LV V1 max P.1 mmHg  LV V1 max: 72.4 cm/sec  LV V1 VTI: 12.5 cm  MR ERO: 0.15 cm2  MR volume: 18.7 ml  CO(LVOT): 5.3  l/min  CI(LVOT): 3.0 l/min/m2  SV(LVOT): 51.7 ml  SI(LVOT): 29.5 ml/m2  PA acc time: 0.12 sec  TR max iraj: 218.1 cm/sec  TR max P.1 mmHg  AV Iraj Ratio (DI): 0.63  JOSE Index (cm2/m2): 1.8  E/E': 11.9  E/E' av.2  Lateral E/e': 11.9     Medial E/e': 20.5  Peak E' Iraj: 11.3 cm/sec     ______________________________________________________________________________  Report approved by: Prakash Dobbs 10/21/2022 11:01 AM             Labs:  Most Recent 3 CBC's:Recent Labs   Lab Test 10/21/22  0253 10/20/22  1848 03/25/18  0809 18  1225   WBC 7.3 8.1  --  9.6   HGB 13.5 14.0 10.7* 10.9*   MCV 86 88  --  75*    184  --  543*     Most Recent 3 BMP's:Recent Labs   Lab Test 10/21/22  0253 10/20/22  1848 05/04/18  1231 18  0809    137  --  141   POTASSIUM 4.1 4.6 4.5 3.9   CHLORIDE 107 110*  --  109*   CO2 17* 16*  --  20*   BUN 28* 25*  --  18   CR 1.01 0.96  --  0.57*   ANIONGAP 12 11  --  12   ALAN 8.7 9.0  --  9.4   * 98  --  67*     Most Recent 2 LFT's:Recent Labs   Lab Test 10/21/22  0253 10/20/22  1848   AST 79* 88*   * 180*   ALKPHOS 206* 212*   BILITOTAL 1.6* 1.4*     Most Recent TSH and T4:Recent Labs   Lab Test 10/20/22  1848   TSH 0.08*   T4 1.24       Medications:   Personally Reviewed.  Medications     heparin 1,050 Units/hr (10/21/22 1217)     - MEDICATION INSTRUCTIONS -         furosemide  10 mg Intravenous Q12H     [START ON 10/22/2022] methimazole  10 mg Oral Daily     pantoprazole  40 mg Oral QAM AC     sodium chloride (PF)  3 mL Intracatheter Q8H

## 2022-10-21 NOTE — PROGRESS NOTES
Cardiology on-call  Called about this patient in the ER who presents with atrial fibrillation with rapid ventricular response, generalized weakness, found to have an acute pulmonary embolism with CT scan reporting significant PE noted to be most marked in the left main pulmonary artery with some secondary findings of right heart strain.  A trivial pericardial effusion was described.  Mild coronary calcification noted.  EKG demonstrates atrial fibrillation with rapid ventricular response with right bundle branch block.  It is noted that she was in sinus rhythm with a right bundle branch block in 2018.  She has an elevated BNP with a history of reduced ejection fraction in 2018 with an EF of 20% in March 2018 and a repeat echocardiogram in March 2018 where EF was 52%.  Patient saw Dr. Gonzales at that time.  It appears that she had a pulmonary embolism at that time.  She also has a history of Graves' with thyrotoxicosis.  Symptom presentation sounds very similar to 2018.  Per review of the note she was started on IV heparin.  We discussed cautious use of beta-blocker specifically metoprolol as blood pressure and pulse allow.  TSH is low at .08.  Tachycardia is at least in part being driven by the pulmonary embolism and the hyperthyroidism.  Per review of the ER note she has been feeling lousy for the past few weeks and has been aware of palpitations.  She takes methimazole but reportedly has not had her thyroid checked in some time.  She has not been taking blood thinners as previously recommended.    Patient is not hypotensive, BNP is elevated but troponin is normal       Plan: cautious use of beta-blockers to modestly slow heart rate.  Echocardiogram  Anticoagulation per primary team and pulmonary.  Would recommend discussing with pulmonary given the large pulmonary embolism burden.  This is described as 70% maximum stenosis involving the left main pulmonary artery at the site of the clot.  Secondary findings of right  heart strain are noted.

## 2022-10-21 NOTE — PLAN OF CARE
Goal Outcome Evaluation:      Plan of Care Reviewed With: patient    Problem: Plan of Care - These are the overarching goals to be used throughout the patient stay.    Goal: Absence of Hospital-Acquired Illness or Injury  Intervention: Identify and Manage Fall Risk  Recent Flowsheet Documentation  Taken 10/21/2022 1817 by Livier Torres RN  Safety Promotion/Fall Prevention: activity supervised     Problem: Plan of Care - These are the overarching goals to be used throughout the patient stay.    Goal: Optimal Comfort and Wellbeing  Outcome: Met     Overall Patient Progress: improvingOverall Patient Progress: improving    Outcome Evaluation: patient remains on IV heparin infusing through IV for PE. Will monitor anti xa. Patient is on strict bedrest with purewick catheter. Vitals stable on RA. Denying any chest pain. Patient endorses some shortness of breath with minimal exertion

## 2022-10-21 NOTE — H&P
[facility] MEDICINE ADMISSION HISTORY AND PHYSICAL       Assessment & Plan      1. PE with right heart strain - BP is OK. She at some point was on xarelto for Hx of DVT?PE     - IV heparin infusion as ordered  - ECHO in AM  - tele and pulse ox  - ICU admission for now   - will consult ICU    2. Rapid Afib -150s with likely in Acute systolic CHF -- Last known ECHO   3/19/2018 showed EF of 52%.    - need rate controller but with CHF, treatment options limited  - cardiology was consulted in ED.   - repeat ECHO   - was given 40 of lasix, will continue at 10 mgs q12, and titrate as needed  - if not better, do BiPAP   - Keep K above 4 and Mag above 2    3. Graves disease, and possibly in thyroid storm - TSH still suppressed, free T4 and T3 are still pendig. King-Wartofsky Score of 60-70 (any above 45 is c/w storm). Triggered likely by PE.  She was admitted back in 2018 for same issue with thyroid storm  - Continue methimazole - 20 mgs every 6 hours and titrate to home dose as indicated. (If rising AST/ALT -- reduce dose of methimazole)  - Need SSKI 50 mgs every 6 hours   - Need steroids - hydrocortisone 300 mgs and then 100 q8  - Needs BB if no CI   - TSH/FT4  - low dose ativan for anxiety    4. Mild AST/ALT elev with ALP --- no belly pain. Could be congestive hepatopathy  - if worsening, will do abdominal imaging   - check for viral hepatitis prof       1115PM - Discussed with Tele-ICU, made aware of the PE study results no recs given stable BP.       VTE prophylaxis: on heparin   Diet:  NPO for now  Code Status: Full,  COVID test result:  negative   COVID vaccination: completed   Barriers to discharge: admitting clinical condition  Discharge Disposition and goals:  Unable to determine at this point, pending clinical progress and response to treatment. Patient may need transfer to SNF or ACR if unsafe to go home and needed treatment inappropriate at home setting OR may need home health  care evaluation if care can be delivered at home settings. Consider referral to care manager/    PPE - I was wearing PPE when I met the patient - N95 mask, Surgical mask, Isolation gown, Gloves, Safety glasses.      Care plan was created based on available information provided, including patient's condition at the time of encounter.   This plan was discussed with patient and/or family members using layman's terms and have agreed to proceed.   At the end of night shift (9PM - 730A), this case will be presented to the AM Hospitalist.    It is recommended to revise care plan and review history if there is change in condition and/or new clinical information is not available during my encounter.     All or some of home medication/s were not resumed on admission due to safety reasons or contraindications. Dosing and frequency may also have been modified. Please resume/review them during your visit.     70 minutes of total visit duration and greater than 50% was spent in direct evaluation of patient and coordination of care including discussion of diagnostic test results and recommended treatment. .      Steve Ruiz MD, MPH, FACP, [facility]  Internal Medicine - Hospitalist        Chief Complaint SOB      HISTORY     -I met her in ED-7. Met her family as well. She came in with SOB. She was also tachycardic. She is in Afib   -Reported has been SOB for a week. No fever. Very occasional cough. No fevers  -She has been anxious, unable to sleep for 5 days. No ETOH use  -She denies leg swelling. She has history of DVT - not taking xarelto  - Also has Graves disease and she takes 10 mgs a day.  -No fever, No vomiting. No belly pain. No diarrhea  - She feels with no energy.     - In the ED, she was found to have significant PE, most marked in the left main pulmonary artery with some secondary findings of right heart strain.     - She was also in rapid Afib with BNP of over 3000. She was referred to IR and did not feel  she needs intervention.S he was started on heparin infusion. I recommended cardiology cons for rate control.     - ROS --- No headache. No dizziness. No weakness. No CP or SOB. No palpitations. No abdominal pain. No nausea or vomiting. No urinary symptoms. No bleeding symptoms. No weight loss. Rest of 12 point ROS was reviewed and negative.       Past Medical History     History of DVT (deep vein thrombosis) 2018   Leukocytosis 03/15/2018   Pneumonia 2018   Persistent atrial fibrillation 2018   Loculated pleural effusion     PE     Thyrotoxicosis with thyrotoxic crisis, unspecified thyrotoxicosis type     Tobacco user     Pulmonary infarct     History of pulmonary embolism     History of Graves' disease     Dilated cardiomyopathy     Acute on chronic systolic congestive heart failure          Surgical History     Past Surgical History:   Procedure Laterality Date     APPENDECTOMY       CARDIOVERSION  2018     PICC  3/13/2018             Family History      No family history on file.      Social History      .  Social History     Socioeconomic History     Marital status:      Spouse name: Not on file     Number of children: Not on file     Years of education: Not on file     Highest education level: Not on file   Occupational History     Not on file   Tobacco Use     Smoking status: Former     Packs/day: 1.00     Years: 30.00     Pack years: 30.00     Types: Cigarettes     Quit date: 2018     Years since quittin.6     Smokeless tobacco: Never   Substance and Sexual Activity     Alcohol use: No     Drug use: No     Sexual activity: Not on file   Other Topics Concern     Not on file   Social History Narrative     Not on file     Social Determinants of Health     Financial Resource Strain: Not on file   Food Insecurity: Not on file   Transportation Needs: Not on file   Physical Activity: Not on file   Stress: Not on file   Social Connections: Not on file   Intimate Partner  "Violence: Not on file   Housing Stability: Not on file          Allergies      No Known Allergies      Prior to Admission Medications      No current facility-administered medications on file prior to encounter.  methimazole (TAPAZOLE) 5 MG tablet, Take 10 mg by mouth daily  multivitamin therapeutic tablet, [MULTIVITAMIN THERAPEUTIC TABLET] Take 1 tablet by mouth daily.  XARELTO 20 mg Tab, [XARELTO 20 MG TAB] TAKE 1 TABLET(20 MG) BY MOUTH DAILY WITH SUPPER            Review of Systems     A 12 point comprehensive review of systems was negative except as noted above in HPI.    PHYSICAL EXAMINATION       Vitals      Vitals: BP (!) 158/95   Pulse (!) 136   Temp 97.4  F (36.3  C) (Temporal)   Resp 25   Ht 1.626 m (5' 4\")   Wt 70.3 kg (155 lb)   SpO2 98%   BMI 26.61 kg/m    BMI= Body mass index is 26.61 kg/m .      Examination     General Appearance:  Alert, cooperative, no distress  Head:    Normocephalic, without obvious abnormality, atraumatic  EENT:  PERRL, conjunctiva/corneas clear, EOM's intact.   Neck:   Supple, symmetrical, trachea midline, no adenopathy; no NVE  Back:  Symmetric, no curvature, no CVA tenderness  Chest/Lungs: Clear to auscultation bilaterally, respirations unlabored, No tenderness or deformity. No abdominal breathing or use of accessory muscles.   Heart:    Regular rate and rhythm, S1 and S2 normal, no murmur, rub   or gallop  Abdomen: Soft, non-tender, bowel sounds active all four quadrants, not peritoneal on palpation. Not distended  Extremities:  Extremities normal, atraumatic, no swelling   Skin:  Skin color, texture, turgor normal, no rashes or lesion  Neurologic:  Awake and alert, No lateralizing or localizing signs       Pertinent Lab     Results for orders placed or performed during the hospital encounter of 10/20/22   CT Chest Pulmonary Embolism w Contrast   Result Value Ref Range    Radiologist flags Pulmonary emboli. (Urgent)     Impression    IMPRESSION:  1.  Significant PE, " most marked in the left main pulmonary artery with some secondary findings of right heart strain. Please refer to above report for details.    2.  Minute pericardial effusion.    3.  Mild ascites left upper quadrant.      [Urgent Result: Pulmonary emboli.]    Finding was identified on 10/20/2022 8:05 PM.     Dr. Alberts was contacted by me on 10/20/2022 8:18 PM and verbalized understanding of the critical result.     Result Value Ref Range    INR 1.49 (H) 0.85 - 1.15   Partial thromboplastin time   Result Value Ref Range    aPTT 30 22 - 38 Seconds   Comprehensive metabolic panel   Result Value Ref Range    Sodium 137 136 - 145 mmol/L    Potassium 4.6 3.5 - 5.0 mmol/L    Chloride 110 (H) 98 - 107 mmol/L    Carbon Dioxide (CO2) 16 (L) 22 - 31 mmol/L    Anion Gap 11 5 - 18 mmol/L    Urea Nitrogen 25 (H) 8 - 22 mg/dL    Creatinine 0.96 0.60 - 1.10 mg/dL    Calcium 9.0 8.5 - 10.5 mg/dL    Glucose 98 70 - 125 mg/dL    Alkaline Phosphatase 212 (H) 45 - 120 U/L    AST 88 (H) 0 - 40 U/L     (H) 0 - 45 U/L    Protein Total 6.1 6.0 - 8.0 g/dL    Albumin 3.2 (L) 3.5 - 5.0 g/dL    Bilirubin Total 1.4 (H) 0.0 - 1.0 mg/dL    GFR Estimate 67 >60 mL/min/1.73m2   Result Value Ref Range    Troponin I 0.03 0.00 - 0.29 ng/mL   Result Value Ref Range    Magnesium 1.9 1.8 - 2.6 mg/dL   B-Type Natriuretic Peptide ([facility] Only)   Result Value Ref Range    BNP 3,001 (H) 0 - 93 pg/mL   CBC with platelets and differential   Result Value Ref Range    WBC Count 8.1 4.0 - 11.0 10e3/uL    RBC Count 4.97 3.80 - 5.20 10e6/uL    Hemoglobin 14.0 11.7 - 15.7 g/dL    Hematocrit 43.5 35.0 - 47.0 %    MCV 88 78 - 100 fL    MCH 28.2 26.5 - 33.0 pg    MCHC 32.2 31.5 - 36.5 g/dL    RDW 16.8 (H) 10.0 - 15.0 %    Platelet Count 184 150 - 450 10e3/uL    % Neutrophils 66 %    % Lymphocytes 19 %    % Monocytes 13 %    % Eosinophils 0 %    % Basophils 1 %    % Immature Granulocytes 1 %    NRBCs per 100 WBC 0 <1 /100    Absolute Neutrophils 5.4 1.6 - 8.3  10e3/uL    Absolute Lymphocytes 1.5 0.8 - 5.3 10e3/uL    Absolute Monocytes 1.0 0.0 - 1.3 10e3/uL    Absolute Eosinophils 0.0 0.0 - 0.7 10e3/uL    Absolute Basophils 0.1 0.0 - 0.2 10e3/uL    Absolute Immature Granulocytes 0.0 <=0.4 10e3/uL    Absolute NRBCs 0.0 10e3/uL   Extra Red Top Tube   Result Value Ref Range    Hold Specimen JIC    TSH with free T4 reflex   Result Value Ref Range    TSH 0.08 (L) 0.30 - 5.00 uIU/mL           Pertinent Radiology

## 2022-10-21 NOTE — PLAN OF CARE
Vss other then HR which has been intermittently tachy. A&O. Denies pain. PIV running heparin at 10.5 mL/hr. Last AntiXa was 0.60 recheck in Q6 hrs. Diet advanced from NPO to regular. Tolerating well. Plan to discharge home with son once medically cleared.     Pt transferring to . Nurse to nurse handoff completed.

## 2022-10-21 NOTE — PHARMACY-ADMISSION MEDICATION HISTORY
"Pharmacy Note - Admission Medication History    Pertinent Provider Information: Per patient, she hasn't taken her Xarelto in about a year.  When I asked her why she said \"no good reason.\"     ______________________________________________________________________    Prior To Admission (PTA) med list completed and updated in EMR.       PTA Med List   Medication Sig Last Dose     methimazole (TAPAZOLE) 5 MG tablet Take 10 mg by mouth daily 10/19/2022     multivitamin therapeutic tablet [MULTIVITAMIN THERAPEUTIC TABLET] Take 1 tablet by mouth daily. 10/19/2022     XARELTO 20 mg Tab [XARELTO 20 MG TAB] TAKE 1 TABLET(20 MG) BY MOUTH DAILY WITH SUPPER More than a month       Information source(s): [facility]  Method of interview communication: in-person    Summary of Changes to PTA Med List  New: none  Discontinued: Lexapro, ferrous sulfate, metoprolol  Changed: methimazole dose updated    Patient was asked about OTC/herbal products specifically.  PTA med list reflects this.    In the past week, patient estimated taking medication this percent of the time:  50-90% due to other.    Allergies were reviewed, assessed, and updated with the patient.      Patient does not use any multi-dose medications prior to admission.    The information provided in this note is only as accurate as the sources available at the time of the update(s).    Thank you for the opportunity to participate in the care of this patient.    Lilia Jaimes [facility]  10/20/2022 8:42 PM    "

## 2022-10-21 NOTE — CONSULTS
"[facility] Pulmonary/Critical Care Consult Team Note    Kallie Barker,  1960, MRN [id number]  Admitting Dx: Hyperthyroidism [E05.90]  Date / Time of Admission:  10/20/2022  6:28 PM    Overnight Events:  Intake/Output last 3 shifts:  I/O last 3 completed shifts:  In: -   Out: 1200 [Urine:1200]  Came in to the ER with weakness and SOB for weeks  Found to have large PE  Tele-ICU  was contacted overnight, I received page this morning for consultation  She reports this happened when she had her last blood clot in 2018  She can tell when she goes into afib with RVR, she can feel the palpitations  She is on room air  She denies dyspnea at this time    Assessment/Plan: Kallie Barker is a 62 year old female with PMHx of Graves disease, CHF, with CTA found to have a pulmonary Embolus.    Pulmonary embolus   - agree with heparin gtt and coumadin or elliquis  - hypercoagulopathy wkup including Antithrombin III deficiency, Factor 5, Factor 2, Protein C or protein S deficiency, Lupus anticoagulant, Homocystinuria. Also age appropriate screening for Occult neoplasm   - consulted IR for opinion on thrombolytics, they reviewed the images and she is not a candidate for thrombolytics  - medical management of other issues per primary team    Will sign off, please let our service know if any change in clinical condition or questions.      Consult Care Time excluding procedures and family discussions greater than: 45 Minutes    Risk Factors Present on Admission:  Clinically Significant Risk Factors Present on Admission              # Hypoalbuminemia: Lowest albumin = 3.2 g/dL (Ref range: 3.5-5.2) at 10/20/2022  6:48 PM, will monitor as appropriate   # Drug Induced Coagulation Defect: home medication list includes an anticoagulant medication    # Overweight: Estimated body mass index is 26.61 kg/m  as calculated from the following:    Height as of this encounter: 1.626 m (5' 4\").    Weight as of this encounter: 70.3 kg " "(155 lb).          Zita Zhu DO  Pulmonary and Critical Care Attending  pgr [phone]    No Known Allergies    Meds: See MAR    Physical Exam:  /81 (BP Location: Left arm)   Pulse 103   Temp 97.6  F (36.4  C) (Oral)   Resp 20   Ht 1.626 m (5' 4\")   Wt 70.3 kg (155 lb)   SpO2 95%   BMI 26.61 kg/m    Intake/Output this shift:  No intake/output data recorded.  GEN: laying in bed, NAD  HEENT: MMM  CVS: regular rhythm, no murmurs  RESP: CTA BL   ABD: Soft, No abdominal pain with palpation, no guarding, no rigidity  EXT: Warm, well perfused, no edema  NEURO:  Moving all extremities, nonfocal  PSYCH: pleasant    Pertinent Labs: Latest lab results in EHR personally reviewed.   CMP  Recent Labs   Lab 10/21/22  0253 10/20/22  1848    137   POTASSIUM 4.1 4.6   CHLORIDE 107 110*   CO2 17* 16*   ANIONGAP 12 11   * 98   BUN 28* 25*   CR 1.01 0.96   GFRESTIMATED 63 67   ALAN 8.7 9.0   MAG  --  1.9   PROTTOTAL 5.9* 6.1   ALBUMIN 3.3* 3.2*   BILITOTAL 1.6* 1.4*   ALKPHOS 206* 212*   AST 79* 88*   * 180*     CBC  Recent Labs   Lab 10/21/22  0253 10/20/22  1848   WBC 7.3 8.1   RBC 4.76 4.97   HGB 13.5 14.0   HCT 41.0 43.5   MCV 86 88   MCH 28.4 28.2   MCHC 32.9 32.2   RDW 16.5* 16.8*    184     INR  Recent Labs   Lab 10/20/22  1848   INR 1.49*       Imaging: personally reviewed.   Results for orders placed or performed during the hospital encounter of 10/20/22   CT Chest Pulmonary Embolism w Contrast     Value    Radiologist flags Pulmonary emboli. (Urgent)    Narrative    EXAM: CT CHEST PULMONARY EMBOLISM W CONTRAST  LOCATION: [facility]  DATE/TIME: 10/20/2022 7:57 PM    INDICATION: SOB, history of Afib  COMPARISON: 03/12/2018  TECHNIQUE: CT chest pulmonary angiogram during arterial phase injection of IV contrast. Multiplanar reformats and MIP reconstructions were performed. Dose reduction techniques were used.   CONTRAST: ISOVUE 370 75 " mL    FINDINGS:  ANGIOGRAM CHEST: There is significant PE seen in the left main pulmonary artery measuring approximately 5.7 cm x 2.1 cm in this involves the lateral periphery of the left main pulmonary artery and causes approximately 70% maximum stenosis involving the   left main pulmonary artery at the site of clot. There is a small area of clot seen involving the right interlobar pulmonary artery inferiorly measuring approximately 7 x 14 mm with no significant stenosis. There is a mild amount of clot seen involving   the proximal aspect of a left lower lobe segmental pulmonary artery anteriorly. No other PE is seen. There is significant reflux of contrast in the IVC and into the hepatic veins worrisome for right heart strain.Thoracic aorta is negative for dissection.    LUNGS AND PLEURA: There is minimal atelectasis seen primarily in the left upper lobe. No significant pleural effusions.    MEDIASTINUM/AXILLAE: Normal. There is a minute pericardial effusion.     CORONARY ARTERY CALCIFICATION: Mild.    UPPER ABDOMEN: Mild ascites in the left upper quadrant.    MUSCULOSKELETAL: Mild degenerative changes and a Schmorl's node in the thoracic spine.      Impression    IMPRESSION:  1.  Significant PE, most marked in the left main pulmonary artery with some secondary findings of right heart strain. Please refer to above report for details.    2.  Minute pericardial effusion.    3.  Mild ascites left upper quadrant.      [Urgent Result: Pulmonary emboli.]    Finding was identified on 10/20/2022 8:05 PM.     Dr. Alberts was contacted by me on 10/20/2022 8:18 PM and verbalized understanding of the critical result.         Patient Active Problem List   Diagnosis     Persistent atrial fibrillation (H)     Loculated pleural effusion     PE     Thyrotoxicosis with thyrotoxic crisis, unspecified thyrotoxicosis type     Tobacco user     Pneumonia     Pulmonary infarct (H)     History of pulmonary embolism     History of Graves'  disease     Dilated cardiomyopathy (H)     Acute on chronic systolic congestive heart failure (H)     Leukocytosis     History of DVT (deep vein thrombosis)     Hyperthyroidism     Atrial fibrillation with RVR (H)     Acute on chronic congestive heart failure, unspecified heart failure type (H)     Other acute pulmonary embolism, unspecified whether acute cor pulmonale present (H)     No past medical history on file.  Past Surgical History:   Procedure Laterality Date     APPENDECTOMY       CARDIOVERSION  2018     PICC  3/13/2018          Social History     Tobacco Use     Smoking status: Former     Packs/day: 1.00     Years: 30.00     Pack years: 30.00     Types: Cigarettes     Quit date: 2018     Years since quittin.6     Smokeless tobacco: Never   Substance Use Topics     Alcohol use: No     Drug use: No     No family history on file.      Zita Zhu DO  Pulmonary and Critical Care Attending  [facility] [phone]    Securely message with the Vocera Web Console (learn more here)

## 2022-10-22 LAB — UFH PPP CHRO-ACNC: 0.45 IU/ML

## 2022-10-22 PROCEDURE — 250N000013 HC RX MED GY IP 250 OP 250 PS 637: Performed by: INTERNAL MEDICINE

## 2022-10-22 PROCEDURE — 250N000011 HC RX IP 250 OP 636: Performed by: INTERNAL MEDICINE

## 2022-10-22 PROCEDURE — 85520 HEPARIN ASSAY: CPT | Performed by: INTERNAL MEDICINE

## 2022-10-22 PROCEDURE — 99232 SBSQ HOSP IP/OBS MODERATE 35: CPT | Performed by: INTERNAL MEDICINE

## 2022-10-22 PROCEDURE — 36415 COLL VENOUS BLD VENIPUNCTURE: CPT | Performed by: INTERNAL MEDICINE

## 2022-10-22 PROCEDURE — 99233 SBSQ HOSP IP/OBS HIGH 50: CPT | Performed by: INTERNAL MEDICINE

## 2022-10-22 PROCEDURE — 210N000002 HC R&B HEART CARE

## 2022-10-22 RX ADMIN — METHIMAZOLE 10 MG: 5 TABLET ORAL at 08:45

## 2022-10-22 RX ADMIN — FUROSEMIDE 10 MG: 10 INJECTION, SOLUTION INTRAVENOUS at 17:52

## 2022-10-22 RX ADMIN — FUROSEMIDE 10 MG: 10 INJECTION, SOLUTION INTRAVENOUS at 06:35

## 2022-10-22 RX ADMIN — PANTOPRAZOLE SODIUM 40 MG: 20 TABLET, DELAYED RELEASE ORAL at 06:35

## 2022-10-22 RX ADMIN — RIVAROXABAN 15 MG: 15 TABLET, FILM COATED ORAL at 16:07

## 2022-10-22 ASSESSMENT — ACTIVITIES OF DAILY LIVING (ADL)
FALL_HISTORY_WITHIN_LAST_SIX_MONTHS: NO
ADLS_ACUITY_SCORE: 38
ADLS_ACUITY_SCORE: 33
DRESSING/BATHING_DIFFICULTY: NO
CONCENTRATING,_REMEMBERING_OR_MAKING_DECISIONS_DIFFICULTY: NO
ADLS_ACUITY_SCORE: 34
TOILETING_ISSUES: NO
DOING_ERRANDS_INDEPENDENTLY_DIFFICULTY: NO
CHANGE_IN_FUNCTIONAL_STATUS_SINCE_ONSET_OF_CURRENT_ILLNESS/INJURY: NO
WALKING_OR_CLIMBING_STAIRS_DIFFICULTY: NO
ADLS_ACUITY_SCORE: 33
WEAR_GLASSES_OR_BLIND: NO
ADLS_ACUITY_SCORE: 34
ADLS_ACUITY_SCORE: 33
DIFFICULTY_EATING/SWALLOWING: NO
ADLS_ACUITY_SCORE: 33
ADLS_ACUITY_SCORE: 33

## 2022-10-22 NOTE — PLAN OF CARE
Problem: Plan of Care - These are the overarching goals to be used throughout the patient stay.    Goal: Absence of Hospital-Acquired Illness or Injury  Intervention: Identify and Manage Fall Risk  Recent Flowsheet Documentation  Taken 10/22/2022 0415 by Sonia Kong RN  Safety Promotion/Fall Prevention: fall prevention program maintained  Taken 10/22/2022 0015 by Sonia Kong RN  Safety Promotion/Fall Prevention: fall prevention program maintained  Taken 10/21/2022 2100 by Sonia Kong RN  Safety Promotion/Fall Prevention: fall prevention program maintained   Goal Outcome Evaluation:               Pt continues on Heparin gtt per protocol.  Anti xa WNL at 1900 last night.  No change in Heparin gtt rate. Pt denies shortness of breath or chest pain.  Sleeping at intervals.  O2 sats WNL.  Continue to monitor.

## 2022-10-22 NOTE — PROGRESS NOTES
PULMONARY / CRITICAL CARE PROGRESS NOTE    Date / Time of Admission:  10/20/2022  6:28 PM    Assessment:     Kallie Barker is a 62 year old female with history of atrial fibrillation, cardiomyopathy, Graves disease, pulmonary embolism 2018, previous tobacco user.   Presents to ED complaining of shortness of breath for three days.   Patient lost follow up over the last three years, not taking anticoagulation.  Patient was tachycardic, normotensive, mild hypoxia.   Chest CT showed pulmonary embolism, significant PE, most marked in the left main pulmonary artery with some secondary findings of right heart strain.   Patient was started on heparin drip. Echocardiogram showed EF 35-40%, dilated LA, mild decrease RV function, moderate MR, moderate TR, IVC > 2.1 cm, mild AR, small pericardial effusion   Pulmonary service consulted.     1. Pulmonary embolism  Significant pulmonary embolism, most marked in the left main pulmonary artery with right and left basal emboli, mild right heart strain. No DVT in LEs. Echo showed decrease EF, mild dilated RV.    Hx pulmonary embolism on 3/38351, chest CT scans at that time showed large filling defect in the left main pulmonary artery and left upper lobe artery was smaller filling defects in left and right lower lobes.  A short term follow up scan 3/12 showed improvement of blood clot located in left main pulmonary artery.   Case was discussed with radiology , in view of persistent location of pulmonary artery thrombus. Organized thrombus vs vascular tumor.   Plan for follow up chest CT scan in weeks with and without contrast to rule out vascular tumor.   Recommend long term anticoagulation with NOACs.   Hypercoagulable work up is not needed.   2. Atrial fibrillation   3. HTN, cardiomyopathy   4. Graves disease    Advance Directives: full code    Plan:   1. Titrate FiO2, keep SpO2 > 90%, patient is currently on RA  2. Titrate BP medications, agree adding gently diuresis    3. Continue treatment of Graves disease , and follow up with endocrinology as outpatient  4. Start NOACs and discontinue heparin drip   5. Long term anticoagulation   6. Follow up chest CT scan with and without contrast in 8 weeks to rule out vascular tumor/organized clot.   7. Follow up echocardiogram in 8 weeks  8. Follow up with pulmonary and cardiology as outpatient.     Please contact me if you have any questions.    Hugo Thomas  Pulmonary / Critical Care  10/22/2022  10:27 AM      Subjective:   HPI:  Kallie Barker is a 62 year old female with history of persistent atrial fibrillation, cardiomyopathy Graves disease, pulmonary embolism 2018, previous tobacco user.   Presents to ED complaining of shortness of breath for three days.   Patient lost follow up over the last three years, not taking anticoagulation.  Patient was tachycardic, normotensive, mild hypoxia.   Chest CT showed pulmonary embolism, Significant PE, most marked in the left main pulmonary artery with some secondary findings of right heart strain.   Patient was started on heparin drip. Echocardiogram showed EF 35-40%, dilated LA, mild decrease RV function, moderate MR, moderate TR, IVC > 2.1 cm, mild AR, small pericardial effusion   Pulmonary service consulted.     Events overnight  - Decrease shortness of breath  - Ambulating short distances    Allergies: Patient has no known allergies.     MEDS:  Current Facility-Administered Medications   Medication     acetaminophen (TYLENOL) tablet 325 mg    Or     acetaminophen (TYLENOL) Suppository 325 mg     furosemide (LASIX) injection 10 mg     heparin 25,000 units in 0.45% NaCl 250 mL ANTICOAGULANT infusion     influenza recomb quadrivalent PF (FLUBLOK) injection 0.5 mL     lidocaine (LMX4) cream     lidocaine 1 % 0.1-1 mL     LORazepam (ATIVAN) injection 0.25 mg     melatonin tablet 1 mg     methimazole (TAPAZOLE) tablet 10 mg     metoprolol (LOPRESSOR) injection 5 mg      "pantoprazole (PROTONIX) EC tablet 40 mg     Patient is already receiving anticoagulation with heparin, enoxaparin (LOVENOX), warfarin (COUMADIN)  or other anticoagulant medication     sodium chloride (PF) 0.9% PF flush 3 mL     sodium chloride (PF) 0.9% PF flush 3 mL       Objective:   VITALS:  /80 (BP Location: Left arm, Patient Position: Semi-May's, Cuff Size: Adult Regular)   Pulse 75   Temp 97.7  F (36.5  C) (Oral)   Resp 20   Ht 1.626 m (5' 4\")   Wt 74.2 kg (163 lb 9.3 oz)   SpO2 96%   BMI 28.08 kg/m    VENT:  Resp: 20    EXAM:   Gen: awake, alert, no distress  HEENT: pink conjunctiva, moist mucosa, Mallampati II/IV  Neck: no thyromegaly, masses or JVD  Lungs: clear  CV: regular, no murmurs or gallops appreciated  Abdomen: soft, NT, BS wnl  Ext: trace edema  Neuro: CN II-XII intact, non focal     Data Review:  Recent Labs   Lab 10/21/22  0253 10/20/22  1848   * 98      10/21/22 02:53   Sodium 136   Potassium 4.1   Chloride 107   Carbon Dioxide 17 (L)   Urea Nitrogen 28 (H)   Creatinine 1.01   GFR Estimate 63   Calcium 8.7   Anion Gap 12   Albumin 3.3 (L)   Protein Total 5.9 (L)   Alkaline Phosphatase 206 (H)    (H)   AST 79 (H)   Bilirubin Total 1.6 (H)   Procalcitonin 0.73 (H)   Troponin I 0.03      10/21/22 02:53   WBC 7.3   Hemoglobin 13.5   Hematocrit 41.0   Platelet Count 172   RBC Count 4.76   MCV 86   MCH 28.4   MCHC 32.9   RDW 16.5 (H)   % Neutrophils 82   % Lymphocytes 12   % Monocytes 5   % Eosinophils 0     Echocardiogram 10/21/2022  The left ventricle is borderline dilated.  Left ventricular function is decreased. The ejection fraction is 35-40%  (moderately reduced).  Mildly decreased right ventricular systolic function  The left atrium is severely dilated.  The right atrium is severely dilated.  The mitral leaflets appear thickened, hooded, and/or redundant, consistent  with myxomatous degeneration.  There is moderately severe (3+) mitral regurgitation.  There is " moderate (2+) tricuspid regurgitation.  IVC diameter >2.1 cm collapsing <50% with sniff suggests a high RA pressure  estimated at 15 mmHg or greater.  There is mild (1+) aortic regurgitation. Small pericardial effusion    US LOWER EXTREMITY VENOUS DUPLEX BILATERAL  LOCATION: [facility]  DATE/TIME: 10/21/2022 3:34 PM  IMPRESSION:  1.  No deep venous thrombosis in the bilateral lower extremities. The nonocclusive thrombus that was seen in the right femoral vein on the prior study is no longer present.    CT CHEST PULMONARY EMBOLISM W CONTRAST  LOCATION: [facility]  DATE/TIME: 10/20/2022 7:57 PM  INDICATION: SOB, history of Afib  COMPARISON: 03/12/2018  FINDINGS:  ANGIOGRAM CHEST: There is significant PE seen in the left main pulmonary artery measuring approximately 5.7 cm x 2.1 cm in this involves the lateral periphery of the left main pulmonary artery and causes approximately 70% maximum stenosis involving the   left main pulmonary artery at the site of clot. There is a small area of clot seen involving the right interlobar pulmonary artery inferiorly measuring approximately 7 x 14 mm with no significant stenosis. There is a mild amount of clot seen involving the proximal aspect of a left lower lobe segmental pulmonary artery anteriorly. No other PE is seen. There is significant reflux of contrast in the IVC and into the hepatic veins worrisome for right heart strain.Thoracic aorta is negative for dissection.  LUNGS AND PLEURA: There is minimal atelectasis seen primarily in the left upper lobe. No significant pleural effusions.  MEDIASTINUM/AXILLAE: Normal. There is a minute pericardial effusion.   CORONARY ARTERY CALCIFICATION: Mild.  UPPER ABDOMEN: Mild ascites in the left upper quadrant.  MUSCULOSKELETAL: Mild degenerative changes and a Schmorl's node in the thoracic spine.                                          IMPRESSION:  1.  Significant PE, most marked in  the left main pulmonary artery with some secondary findings of right heart strain. Please refer to above report for details.  2.  Minute pericardial effusion.   3.  Mild ascites left upper quadrant.      CTA CHEST PE RUN  3/2/2018 7:45 AM  INDICATION: Dyspnea, cardiac origin suspected dyspnea.  COMPARISON: None.   FINDINGS:  ANGIOGRAM CHEST: There is a large filling defect in the left main pulmonary artery extending into the left upper lobe artery consistent with large pulmonary embolus differential diagnosis would include neoplasm invading the pulmonary artery. There is no thoracic aortic aneurysm. There is additional partially occlusive filling defect in the left lower lobe pulmonary artery. There is a filling defect in a right lower lobe segmental artery on image 2/2 of series 4. Findings are consistent with large pulmonary embolus on the left and small pulmonary embolus on the right.  RV/LV RATIO: 1.4 which is abnormally elevated.  LUNGS AND PLEURA: There is a large partially loculated left pleural effusion. There is masslike opacity in the posterior left lower lobe there is a 1.9 x 3 cm on image 61 of series 5. I cannot exclude a mass versus atelectasis. There is mild atelectasis in the right upper lobe the right lung is otherwise clear. There is atelectasis in the left lower lobe. MEDIASTINUM: The atelectasis or mass in the left upper lung extends to the left hilum no discrete lymphadenopathy is identified. No right hilar lymphadenopathy. No pericardial effusion. There is an elevated RV LV ratio suggesting possible right heart strain. The main pulmonary artery measures 3.9 cm in diameter and the aortic root also measures 3.9 cm.  LIMITED UPPER ABDOMEN: There is reflux of contrast into the inferior vena cava and hepatic veins which can be seen with elevated right heart pressure.  MUSCULOSKELETAL: Negative.  Findings were called to Dr. Crabtree at 800 hours on 03/02/2018.  IMPRESSION:  CONCLUSION:  1.  Large  filling defect in the left main pulmonary artery and left upper lobe artery was smaller filling defects in left lower lobe and right lower lobe segmental arteries consistent with a large left-sided pulmonary embolus in small right-sided pulmonary embolus. Less likely would be a mass invading the left pulmonary artery catheter and bilateral filling defects.  2.  Large partially loculated left pleural effusion. There is atelectasis or mass in the left upper lobe and atelectasis in the left lower lobe. I cannot exclude an underlying left lung cancer.  3.  Cardiomegaly with signs of possible right heart strain.    By:  Hugo Thomas MD, 10/22/2022  10:27 AM    Primary Care Physician:  Waqas Guthrie

## 2022-10-22 NOTE — PROGRESS NOTES
[facility] MEDICINE PROGRESS NOTE      Identification/Summary: Kallie Barker is a 62 year old female with a past medical history of  who was admitted on 10/20/2022 for . Hospital course is notable for. PE and parox afib now resolved    Plan: switch over to Xarelto tomorrow  discharge sun or mon  Needs endo /follow-up for ablation/surgery for Graves; methimazole for now.  See below    Assessment and Plan:from Pulmonary consultant       1. Pulmonary embolism  Significant pulmonary embolism, most marked in the left main pulmonary artery with right and left basal emboli, mild right heart strain. No DVT in LEs. Echo showed decrease EF, mild dilated RV.    Hx pulmonary embolism on 3/91218, chest CT scans at that time showed large filling defect in the left main pulmonary artery and left upper lobe artery was smaller filling defects in left and right lower lobes.  A short term follow up scan 3/12 showed improvement of blood clot located in left main pulmonary artery.   Case was discussed with radiology , in view of persistent location of pulmonary artery thrombus. Organized thrombus vs vascular tumor.   Plan for follow up chest CT scan in weeks with and without contrast to rule out vascular tumor.   Recommend long term anticoagulation with NOACs.   Hypercoagulable work up is not needed.   2. Atrial fibrillation   3. HTN, cardiomyopathy   4. Graves disease     Advance Directives: full code     Plan:   1. Titrate FiO2, keep SpO2 > 90%, patient is currently on RA  2. Titrate BP medications, agree adding gently diuresis   3. Continue treatment of Graves disease , and follow up with endocrinology as outpatient  4. Start NOACs and discontinue heparin drip   5. Long term anticoagulation   6. Follow up chest CT scan with and without contrast in 8 weeks to rule out vascular tumor/organized clot.   7. Follow up echocardiogram in 8 weeks  8. Follow up with pulmonary and cardiology as outpatient.  "     Please contact me if you have any questions.        Clinically Significant Risk Factors              # Hypoalbuminemia: Lowest albumin = 3.2 g/dL (Ref range: 3.5-5.2) at 10/20/2022  6:48 PM, will monitor as appropriate          # Overweight: Estimated body mass index is 28.08 kg/m  as calculated from the following:    Height as of this encounter: 1.626 m (5' 4\").    Weight as of this encounter: 74.2 kg (163 lb 9.3 oz)., PRESENT ON ADMISSION         Diet: Regular Diet Adult  DVT Prophylaxis:    Code Status: Full Code    Anticipated possible discharge in  days to  once  milestones are met.  Disposition Plan      Expected Discharge Date: 10/24/2022        Discharge Comments: Heparin gtt, strict bedrest.  Change to PO lasix today. Likely home with family at D/C.        The patient's care was discussed with the Patient.    Owen Huerta MD  [facility]  [facility]  [facility]  Phone: #[phone]    Interval History/Subjective:      Physical Exam/Objective:  Temp:  [97.5  F (36.4  C)-98.4  F (36.9  C)] 97.5  F (36.4  C)  Pulse:  [65-78] 78  Resp:  [20-24] 22  BP: (108-137)/(63-91) 127/89  SpO2:  [93 %-97 %] 93 %  Body mass index is 28.08 kg/m .  All smiles   Lungs clear   Cor regular    Data reviewed today: I personally reviewed all new medications, labs, imaging/diagnostics reports over the past 24 hours. Pertinent findings include:    Imaging:   Recent Results (from the past 24 hour(s))   US Lower Extremity Venous Duplex Bilateral    Narrative    EXAM: US LOWER EXTREMITY VENOUS DUPLEX BILATERAL  LOCATION: [facility]  DATE/TIME: 10/21/2022 3:34 PM    INDICATION: large PE; history of DVT in past  COMPARISON: 3/2/2018.  TECHNIQUE: Venous Duplex ultrasound of bilateral lower extremities with and without compression, augmentation and duplex. Color flow and spectral Doppler with waveform analysis performed.    FINDINGS: Exam includes the common femoral, " femoral, popliteal veins as well as segmentally visualized deep calf veins and greater saphenous vein.     RIGHT: No deep vein thrombosis. No superficial thrombophlebitis. No popliteal cyst.    LEFT: No deep vein thrombosis. No superficial thrombophlebitis. No popliteal cyst.      Impression    IMPRESSION:  1.  No deep venous thrombosis in the bilateral lower extremities. The nonocclusive thrombus that was seen in the right femoral vein on the prior study is no longer present.       Labs:  Most Recent 3 CBC's:Recent Labs   Lab Test 10/21/22  0253 10/20/22  1848 03/25/18  0809 03/21/18  1225   WBC 7.3 8.1  --  9.6   HGB 13.5 14.0 10.7* 10.9*   MCV 86 88  --  75*    184  --  543*     Most Recent 3 BMP's:Recent Labs   Lab Test 10/21/22  0253 10/20/22  1848 05/04/18  1231 03/25/18  0809    137  --  141   POTASSIUM 4.1 4.6 4.5 3.9   CHLORIDE 107 110*  --  109*   CO2 17* 16*  --  20*   BUN 28* 25*  --  18   CR 1.01 0.96  --  0.57*   ANIONGAP 12 11  --  12   ALAN 8.7 9.0  --  9.4   * 98  --  67*       Medications:   Personally Reviewed.  Medications     heparin 1,050 Units/hr (10/22/22 1148)     - MEDICATION INSTRUCTIONS -         furosemide  10 mg Intravenous Q12H     influenza recomb quadrivalent PF  0.5 mL Intramuscular Prior to discharge     methimazole  10 mg Oral Daily     pantoprazole  40 mg Oral QAM AC     rivaroxaban ANTICOAGULANT  15 mg Oral BID w/meals    Followed by     [START ON 11/12/2022] rivaroxaban ANTICOAGULANT  20 mg Oral Daily with supper     sodium chloride (PF)  3 mL Intracatheter Q8H

## 2022-10-23 VITALS
DIASTOLIC BLOOD PRESSURE: 85 MMHG | HEART RATE: 67 BPM | HEIGHT: 64 IN | TEMPERATURE: 97.3 F | BODY MASS INDEX: 27.86 KG/M2 | SYSTOLIC BLOOD PRESSURE: 136 MMHG | OXYGEN SATURATION: 96 % | RESPIRATION RATE: 20 BRPM | WEIGHT: 163.2 LBS

## 2022-10-23 LAB
ALBUMIN SERPL-MCNC: 3.1 G/DL (ref 3.5–5)
ALP SERPL-CCNC: 146 U/L (ref 45–120)
ALT SERPL W P-5'-P-CCNC: 103 U/L (ref 0–45)
ANION GAP SERPL CALCULATED.3IONS-SCNC: 8 MMOL/L (ref 5–18)
AST SERPL W P-5'-P-CCNC: 30 U/L (ref 0–40)
BILIRUB SERPL-MCNC: 0.6 MG/DL (ref 0–1)
BUN SERPL-MCNC: 36 MG/DL (ref 8–22)
CALCIUM SERPL-MCNC: 8.1 MG/DL (ref 8.5–10.5)
CHLORIDE BLD-SCNC: 109 MMOL/L (ref 98–107)
CO2 SERPL-SCNC: 22 MMOL/L (ref 22–31)
CREAT SERPL-MCNC: 1.04 MG/DL (ref 0.6–1.1)
ERYTHROCYTE [DISTWIDTH] IN BLOOD BY AUTOMATED COUNT: 16.5 % (ref 10–15)
GFR SERPL CREATININE-BSD FRML MDRD: 61 ML/MIN/1.73M2
GLUCOSE BLD-MCNC: 104 MG/DL (ref 70–125)
HCT VFR BLD AUTO: 37.6 % (ref 35–47)
HGB BLD-MCNC: 12.2 G/DL (ref 11.7–15.7)
MAGNESIUM SERPL-MCNC: 1.6 MG/DL (ref 1.8–2.6)
MCH RBC QN AUTO: 28.2 PG (ref 26.5–33)
MCHC RBC AUTO-ENTMCNC: 32.4 G/DL (ref 31.5–36.5)
MCV RBC AUTO: 87 FL (ref 78–100)
PLATELET # BLD AUTO: 201 10E3/UL (ref 150–450)
POTASSIUM BLD-SCNC: 3.4 MMOL/L (ref 3.5–5)
POTASSIUM BLD-SCNC: 4.3 MMOL/L (ref 3.5–5)
PROT SERPL-MCNC: 5.7 G/DL (ref 6–8)
RBC # BLD AUTO: 4.32 10E6/UL (ref 3.8–5.2)
SODIUM SERPL-SCNC: 139 MMOL/L (ref 136–145)
WBC # BLD AUTO: 8.6 10E3/UL (ref 4–11)

## 2022-10-23 PROCEDURE — 36415 COLL VENOUS BLD VENIPUNCTURE: CPT | Performed by: INTERNAL MEDICINE

## 2022-10-23 PROCEDURE — 84132 ASSAY OF SERUM POTASSIUM: CPT | Performed by: INTERNAL MEDICINE

## 2022-10-23 PROCEDURE — G0008 ADMIN INFLUENZA VIRUS VAC: HCPCS | Performed by: INTERNAL MEDICINE

## 2022-10-23 PROCEDURE — 250N000011 HC RX IP 250 OP 636: Performed by: INTERNAL MEDICINE

## 2022-10-23 PROCEDURE — 250N000013 HC RX MED GY IP 250 OP 250 PS 637: Performed by: INTERNAL MEDICINE

## 2022-10-23 PROCEDURE — 99239 HOSP IP/OBS DSCHRG MGMT >30: CPT | Performed by: INTERNAL MEDICINE

## 2022-10-23 PROCEDURE — 90682 RIV4 VACC RECOMBINANT DNA IM: CPT | Performed by: INTERNAL MEDICINE

## 2022-10-23 PROCEDURE — 80053 COMPREHEN METABOLIC PANEL: CPT | Performed by: INTERNAL MEDICINE

## 2022-10-23 PROCEDURE — 99233 SBSQ HOSP IP/OBS HIGH 50: CPT | Performed by: INTERNAL MEDICINE

## 2022-10-23 PROCEDURE — 83735 ASSAY OF MAGNESIUM: CPT | Performed by: INTERNAL MEDICINE

## 2022-10-23 PROCEDURE — 82040 ASSAY OF SERUM ALBUMIN: CPT | Performed by: INTERNAL MEDICINE

## 2022-10-23 PROCEDURE — 85027 COMPLETE CBC AUTOMATED: CPT | Performed by: EMERGENCY MEDICINE

## 2022-10-23 RX ORDER — FUROSEMIDE 40 MG
40 TABLET ORAL DAILY
Qty: 30 TABLET
Start: 2022-10-23 | End: 2022-10-23

## 2022-10-23 RX ORDER — POTASSIUM CHLORIDE 1500 MG/1
40 TABLET, EXTENDED RELEASE ORAL ONCE
Status: COMPLETED | OUTPATIENT
Start: 2022-10-23 | End: 2022-10-23

## 2022-10-23 RX ORDER — FUROSEMIDE 40 MG
40 TABLET ORAL DAILY
Qty: 30 TABLET | Refills: 0 | Status: SHIPPED | OUTPATIENT
Start: 2022-10-23 | End: 2023-03-04

## 2022-10-23 RX ORDER — POTASSIUM CHLORIDE 750 MG/1
20 TABLET, EXTENDED RELEASE ORAL DAILY
Qty: 60 TABLET | Refills: 0 | Status: SHIPPED | OUTPATIENT
Start: 2022-10-23 | End: 2023-03-04

## 2022-10-23 RX ORDER — POTASSIUM CHLORIDE 750 MG/1
20 TABLET, EXTENDED RELEASE ORAL DAILY
Qty: 60 TABLET
Start: 2022-10-23 | End: 2022-10-23

## 2022-10-23 RX ORDER — MAGNESIUM SULFATE HEPTAHYDRATE 40 MG/ML
2 INJECTION, SOLUTION INTRAVENOUS ONCE
Status: COMPLETED | OUTPATIENT
Start: 2022-10-23 | End: 2022-10-23

## 2022-10-23 RX ADMIN — RIVAROXABAN 15 MG: 15 TABLET, FILM COATED ORAL at 08:18

## 2022-10-23 RX ADMIN — POTASSIUM CHLORIDE 40 MEQ: 1500 TABLET, EXTENDED RELEASE ORAL at 04:44

## 2022-10-23 RX ADMIN — FUROSEMIDE 10 MG: 10 INJECTION, SOLUTION INTRAVENOUS at 08:19

## 2022-10-23 RX ADMIN — PANTOPRAZOLE SODIUM 40 MG: 20 TABLET, DELAYED RELEASE ORAL at 06:31

## 2022-10-23 RX ADMIN — METHIMAZOLE 10 MG: 5 TABLET ORAL at 08:18

## 2022-10-23 RX ADMIN — INFLUENZA A VIRUS A/WISCONSIN/588/2019 (H1N1) RECOMBINANT HEMAGGLUTININ ANTIGEN, INFLUENZA A VIRUS A/DARWIN/6/2021 (H3N2) RECOMBINANT HEMAGGLUTININ ANTIGEN, INFLUENZA B VIRUS B/AUSTRIA/1359417/2021 RECOMBINANT HEMAGGLUTININ ANTIGEN, AND INFLUENZA B VIRUS B/PHUKET/3073/2013 RECOMBINANT HEMAGGLUTININ ANTIGEN 0.5 ML: 45; 45; 45; 45 INJECTION INTRAMUSCULAR at 10:16

## 2022-10-23 RX ADMIN — MAGNESIUM SULFATE HEPTAHYDRATE 2 G: 40 INJECTION, SOLUTION INTRAVENOUS at 04:44

## 2022-10-23 ASSESSMENT — ACTIVITIES OF DAILY LIVING (ADL)
ADLS_ACUITY_SCORE: 20
ADLS_ACUITY_SCORE: 33
ADLS_ACUITY_SCORE: 20
ADLS_ACUITY_SCORE: 20

## 2022-10-23 NOTE — DISCHARGE SUMMARY
[facility] MEDICINE  DISCHARGE SUMMARY     Primary Care Physician: Waqas Guthrie  Admission Date: 10/20/2022   Discharge Provider: Owen Huerta MD Discharge Date: 10/23/2022   Diet:   Active Diet and Nourishment Order   Procedures     Regular Diet Adult     Diet       Code Status: Full Code   Activity: DCACTIVITY: Activity as tolerated        Condition at Discharge: Good     REASON FOR PRESENTATION(See Admission Note for Details)   Shortness of breath    PRINCIPAL & ACTIVE DISCHARGE DIAGNOSES     Principal Problem:    Acute pulmonary embolism with acute cor pulmonale (H)  Active Problems:    Tobacco user    History of pulmonary embolism    Dilated cardiomyopathy (H)    History of DVT (deep vein thrombosis)    Paroxysmal atrial fibrillation (H)    Acute on chronic congestive heart failure, unspecified heart failure type (H)    Graves' disease    Nonrheumatic mitral valve regurgitation    Decreased cardiac ejection fraction    Hyperthyroidism      PENDING LABS     Unresulted Labs Ordered in the Past 30 Days of this Admission     No orders found from 9/20/2022 to 10/21/2022.            PROCEDURES ( this hospitalization only)          RECOMMENDATIONS TO OUTPATIENT PROVIDER FOR F/U VISIT     Follow-up Appointments     Follow-up and recommended labs and tests       1. 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  Compliance has been an issue with her taking her anticoagulation she has been on  methimazole for her Graves' disease its not entirely clear why she did not have definitive therapy with ablation or even surgery  It sounds like she has been lost to follow-up over the years  Her condition improved remarkably after 24 hours  She was seen in consultation by the pulmonary service who agreed with the plan of care and switched her over to Xarelto after 48 hours of heparin  She was able to be discharged home  With plans for follow-up with pulmonary 8 weeks at which time we will do a CT scan of the chest  She also needs to follow up with endocrinology regarding her history of Graves' disease and methimazole treatment    Discharge Medications with Med changes:     Discharge Medication List as of 10/23/2022 10:50 AM      CONTINUE these medications which have CHANGED    Details   furosemide (LASIX) 40 MG tablet Take 1 tablet (40 mg) by mouth daily Water pill for heart., Disp-30 tablet, R-0, E-Prescribe      potassium chloride ER (KLOR-CON M) 10 MEQ CR tablet Take 2 tablets (20 mEq) by mouth daily (Replace potassium while on furosemide- water pill, Disp-60 tablet, R-0, E-Prescribe      !! rivaroxaban ANTICOAGULANT (XARELTO) 15 MG TABS tablet Take 1 tablet (15 mg) by mouth 2 times daily (with meals) For blood clot lung- after 21 days then decrease dose to 20 mg once daily, Disp-42 tablet, R-0, E-Prescribe      !! rivaroxaban ANTICOAGULANT (XARELTO) 20 MG TABS tablet Take 1 tablet (20 mg) by mouth daily (with dinner) (Blood thinner)- need to take indefinitely to prevent blood clots, Disp-30 tablet, R-0, E-Prescribe       !! - Potential duplicate medications found. Please discuss with provider.      CONTINUE these medications which have NOT CHANGED    Details   methimazole (TAPAZOLE) 5 MG tablet Take 10 mg by mouth daily, Historical      multivitamin therapeutic tablet [MULTIVITAMIN THERAPEUTIC TABLET] Take 1 tablet by mouth daily., Historical                   Rationale for medication changes:    See  above        Consults   Pulmonary and Critical Care    PHARMACY IP CONSULT  PHARMACY IP CONSULT  INTENSIVIST IP CONSULT  INTERVENTIONAL RADIOLOGY ADULT/PEDS IP CONSULT    Immunizations given this encounter     Most Recent Immunizations   Administered Date(s) Administered     Influenza Quad, Recombinant, pf(RIV4) (Flublok) 10/23/2022     Influenza Vaccine IM > 6 months Valent IIV4 (Alfuria,Fluzone) 03/05/2018           Anticoagulation Information      Recent INR results:   Recent Labs   Lab 10/20/22  1848   INR 1.49*     Warfarin doses (if applicable) or name of other anticoagulant: Xarelto      SIGNIFICANT IMAGING FINDINGS     Results for orders placed or performed during the hospital encounter of 10/20/22   CT Chest Pulmonary Embolism w Contrast   Result Value Ref Range    Radiologist flags Pulmonary emboli. (Urgent)     Impression    IMPRESSION:  1.  Significant PE, most marked in the left main pulmonary artery with some secondary findings of right heart strain. Please refer to above report for details.    2.  Minute pericardial effusion.    3.  Mild ascites left upper quadrant.      [Urgent Result: Pulmonary emboli.]    Finding was identified on 10/20/2022 8:05 PM.     Dr. Alberts was contacted by me on 10/20/2022 8:18 PM and verbalized understanding of the critical result.     US Lower Extremity Venous Duplex Bilateral    Impression    IMPRESSION:  1.  No deep venous thrombosis in the bilateral lower extremities. The nonocclusive thrombus that was seen in the right femoral vein on the prior study is no longer present.   Echocardiogram Complete   Result Value Ref Range    LVEF  35-40% (moderately reduced)        SIGNIFICANT LABORATORY FINDINGS     Most Recent 3 CBC's:Recent Labs   Lab Test 10/23/22  0357 10/21/22  0253 10/20/22  1848   WBC 8.6 7.3 8.1   HGB 12.2 13.5 14.0   MCV 87 86 88    172 184     Most Recent 3 BMP's:Recent Labs   Lab Test 10/23/22  0942 10/23/22  0357 10/21/22  0253 10/20/22  1848   NA   --  139 136 137   POTASSIUM 4.3 3.4* 4.1 4.6   CHLORIDE  --  109* 107 110*   CO2  --  22 17* 16*   BUN  --  36* 28* 25*   CR  --  1.04 1.01 0.96   ANIONGAP  --  8 12 11   ALAN  --  8.1* 8.7 9.0   GLC  --  104 150* 98     Most Recent TSH and T4:Recent Labs   Lab Test 10/20/22  1848   TSH 0.08*   T4 1.24           Discharge Orders        CT Chest w/o & w Contrast     Reason for your hospital stay    Blood clots to lung and rapid heart rate (atrial fibrillation)   Overactive thyroid as before     Activity    Your activity upon discharge: activity as tolerated     Discharge Instructions    Very important to stop smoking and follow up with specialists as noted  Life long anticoagulation with Xarelto/Rivaroxaban is necessary to prevent clots     Follow-up and recommended labs and tests     1. Primary care 1-2 weeks  2 endocrinologist ~ 1 month to review plan for hyperthyroidism/Graves  3 Pulmonary [facility] [facility];  in 2 months after CT of chest (with and without contrast)  Dr Plata et al     Diet    Follow this diet upon discharge: Orders Placed This Encounter      Regular Diet Adult       Examination   Physical Exam   Temp:  [97.3  F (36.3  C)-98  F (36.7  C)] 97.3  F (36.3  C)  Pulse:  [67-74] 67  Resp:  [20-22] 20  BP: (103-136)/(63-85) 136/85  SpO2:  [94 %-97 %] 96 %  Wt Readings from Last 1 Encounters:   10/23/22 74 kg (163 lb 3.2 oz)     She is comfortable looks older than her stated age she is able to ambulate without difficulty  Lungs are clear  Heart sinus rhythm with occasional PAC  Neurologic unremarkable      Please see EMR for more detailed significant labs, imaging, consultant notes etc.    I, Owen Huerta MD, personally saw the patient today and spent greater than 30 minutes discharging this patient.    Owen Huerta MD  [facility]    CC:Waqas Guthrie

## 2022-10-23 NOTE — PLAN OF CARE
Problem: Gas Exchange Impaired  Goal: Optimal Gas Exchange  Intervention: Optimize Oxygenation and Ventilation     Problem: VTE (Venous Thromboembolism)  Goal: VTE (Venous Thromboembolism) Symptom Resolution    Pt resting comfortably, no events. She is A&O x4, makes needs known. Reporting mild PEREZ when up to bathroom, otherwise oxygen saturation remains WDL on RA. Tele showing SR w/ first degree AVB. Up to the bathroom independently. Heparin gtt discontinued, transitioned to PO xarelto. Plan to discharge either tomorrow (10/23) or Monday (10/24). Denies any pain, n/v or n/t.

## 2022-10-23 NOTE — PROGRESS NOTES
PULMONARY / CRITICAL CARE PROGRESS NOTE    Date / Time of Admission:  10/20/2022  6:28 PM    Assessment:     Kallie Barker is a 62 year old female with history of atrial fibrillation, cardiomyopathy, Graves disease, pulmonary embolism 2018, previous tobacco user.   Presents to ED complaining of shortness of breath for three days.   Patient lost follow up over the last three years, not taking anticoagulation.  Patient was tachycardic, normotensive, mild hypoxia.   Chest CT showed pulmonary embolism, significant PE, most marked in the left main pulmonary artery with some secondary findings of right heart strain.   Patient was started on heparin drip. Echocardiogram showed EF 35-40%, dilated LA, mild decrease RV function, moderate MR, moderate TR, IVC > 2.1 cm, mild AR, small pericardial effusion   Pulmonary service consulted.     1. Pulmonary embolism  Significant pulmonary embolism, most marked in the left main pulmonary artery with right and left basal emboli, mild right heart strain. No DVT in LEs. Echo showed decrease EF, mild dilated RV.    Hx pulmonary embolism on 3/64589, chest CT scans at that time showed large filling defect in the left main pulmonary artery and left upper lobe artery was smaller filling defects in left and right lower lobes.  A short term follow up scan 3/12 showed improvement of blood clot located in left main pulmonary artery.   Case was discussed with radiology , in view of persistent location of pulmonary artery thrombus. Organized thrombus vs vascular tumor.   Plan for follow up chest CT scan in weeks with and without contrast to rule out vascular tumor.   Recommend long term anticoagulation with NOACs.   Hypercoagulable work up is not needed.   2. Atrial fibrillation   3. HTN, cardiomyopathy   4. Graves disease    Advance Directives: full code    Plan:   1. Titrate FiO2, keep SpO2 > 90%, patient is currently on RA  2. Titrate BP medications, agree adding gently diuresis    3. Continue treatment of Graves disease , and follow up with endocrinology as outpatient  4. Start NOACs and discontinue heparin drip   5. Long term anticoagulation   6. Follow up echocardiogram in 8 weeks  7. Follow up with pulmonary and cardiology as outpatient  8. Follow up chest CT scan with and without contrast in 8 weeks to rule out vascular tumor vs organized clot.     Please contact me if you have any questions.    Hugo Thomas  Pulmonary / Critical Care  10/23/2022  8:56 AM      Subjective:   HPI:  Kallie Barker is a 62 year old female with history of persistent atrial fibrillation, cardiomyopathy Graves disease, pulmonary embolism 2018, previous tobacco user.   Presents to ED complaining of shortness of breath for three days.   Patient lost follow up over the last three years, not taking anticoagulation.  Patient was tachycardic, normotensive, mild hypoxia.   Chest CT showed pulmonary embolism, Significant PE, most marked in the left main pulmonary artery with some secondary findings of right heart strain.   Patient was started on heparin drip. Echocardiogram showed EF 35-40%, dilated LA, mild decrease RV function, moderate MR, moderate TR, IVC > 2.1 cm, mild AR, small pericardial effusion   Pulmonary service consulted. Heparin drip was changed to NOACs.    Events overnight  - Decrease shortness of breath  - Ambulating on the hair       Allergies: Patient has no known allergies.     MEDS:  Current Facility-Administered Medications   Medication     acetaminophen (TYLENOL) tablet 325 mg    Or     acetaminophen (TYLENOL) Suppository 325 mg     furosemide (LASIX) injection 10 mg     influenza recomb quadrivalent PF (FLUBLOK) injection 0.5 mL     lidocaine (LMX4) cream     lidocaine 1 % 0.1-1 mL     LORazepam (ATIVAN) injection 0.25 mg     melatonin tablet 1 mg     methimazole (TAPAZOLE) tablet 10 mg     metoprolol (LOPRESSOR) injection 5 mg     pantoprazole (PROTONIX) EC tablet 40 mg      "Patient is already receiving anticoagulation with heparin, enoxaparin (LOVENOX), warfarin (COUMADIN)  or other anticoagulant medication     rivaroxaban ANTICOAGULANT (XARELTO) tablet 15 mg    Followed by     [START ON 11/12/2022] rivaroxaban ANTICOAGULANT (XARELTO) tablet 20 mg     sodium chloride (PF) 0.9% PF flush 3 mL     sodium chloride (PF) 0.9% PF flush 3 mL       Objective:   VITALS:  /85 (BP Location: Left arm, Patient Position: Semi-May's, Cuff Size: Adult Regular)   Pulse 67   Temp 97.3  F (36.3  C) (Oral)   Resp 20   Ht 1.626 m (5' 4\")   Wt 74 kg (163 lb 3.2 oz)   SpO2 96%   BMI 28.01 kg/m    VENT:  Resp: 20    EXAM:   Gen: awake, alert, no distress  HEENT: pink conjunctiva, moist mucosa, Mallampati II/IV  Neck: no thyromegaly, masses or JVD  Lungs: clear  CV: regular, no murmurs or gallops appreciated  Abdomen: soft, NT, BS wnl  Ext: trace edema  Neuro: CN II-XII intact, non focal     Data Review:  Recent Labs   Lab 10/23/22  0357 10/21/22  0253 10/20/22  1848    150* 98      10/21/22 02:53   Sodium 136   Potassium 4.1   Chloride 107   Carbon Dioxide 17 (L)   Urea Nitrogen 28 (H)   Creatinine 1.01   GFR Estimate 63   Calcium 8.7   Anion Gap 12   Albumin 3.3 (L)   Protein Total 5.9 (L)   Alkaline Phosphatase 206 (H)    (H)   AST 79 (H)   Bilirubin Total 1.6 (H)   Procalcitonin 0.73 (H)   Troponin I 0.03      10/21/22 02:53   WBC 7.3   Hemoglobin 13.5   Hematocrit 41.0   Platelet Count 172   RBC Count 4.76   MCV 86   MCH 28.4   MCHC 32.9   RDW 16.5 (H)   % Neutrophils 82   % Lymphocytes 12   % Monocytes 5   % Eosinophils 0     Echocardiogram 10/21/2022  The left ventricle is borderline dilated.  Left ventricular function is decreased. The ejection fraction is 35-40%  (moderately reduced).  Mildly decreased right ventricular systolic function  The left atrium is severely dilated.  The right atrium is severely dilated.  The mitral leaflets appear thickened, hooded, and/or " redundant, consistent  with myxomatous degeneration.  There is moderately severe (3+) mitral regurgitation.  There is moderate (2+) tricuspid regurgitation.  IVC diameter >2.1 cm collapsing <50% with sniff suggests a high RA pressure  estimated at 15 mmHg or greater.  There is mild (1+) aortic regurgitation. Small pericardial effusion    US LOWER EXTREMITY VENOUS DUPLEX BILATERAL  LOCATION: [facility]  DATE/TIME: 10/21/2022 3:34 PM  IMPRESSION:  1.  No deep venous thrombosis in the bilateral lower extremities. The nonocclusive thrombus that was seen in the right femoral vein on the prior study is no longer present.    CT CHEST PULMONARY EMBOLISM W CONTRAST  LOCATION: [facility]  DATE/TIME: 10/20/2022 7:57 PM  INDICATION: SOB, history of Afib  COMPARISON: 03/12/2018  FINDINGS:  ANGIOGRAM CHEST: There is significant PE seen in the left main pulmonary artery measuring approximately 5.7 cm x 2.1 cm in this involves the lateral periphery of the left main pulmonary artery and causes approximately 70% maximum stenosis involving the   left main pulmonary artery at the site of clot. There is a small area of clot seen involving the right interlobar pulmonary artery inferiorly measuring approximately 7 x 14 mm with no significant stenosis. There is a mild amount of clot seen involving the proximal aspect of a left lower lobe segmental pulmonary artery anteriorly. No other PE is seen. There is significant reflux of contrast in the IVC and into the hepatic veins worrisome for right heart strain.Thoracic aorta is negative for dissection.  LUNGS AND PLEURA: There is minimal atelectasis seen primarily in the left upper lobe. No significant pleural effusions.  MEDIASTINUM/AXILLAE: Normal. There is a minute pericardial effusion.   CORONARY ARTERY CALCIFICATION: Mild.  UPPER ABDOMEN: Mild ascites in the left upper quadrant.  MUSCULOSKELETAL: Mild degenerative changes and a Schmorl's  node in the thoracic spine.                                          IMPRESSION:  1.  Significant PE, most marked in the left main pulmonary artery with some secondary findings of right heart strain. Please refer to above report for details.  2.  Minute pericardial effusion.   3.  Mild ascites left upper quadrant.      CTA CHEST PE RUN  3/2/2018 7:45 AM  INDICATION: Dyspnea, cardiac origin suspected dyspnea.  COMPARISON: None.   FINDINGS:  ANGIOGRAM CHEST: There is a large filling defect in the left main pulmonary artery extending into the left upper lobe artery consistent with large pulmonary embolus differential diagnosis would include neoplasm invading the pulmonary artery. There is no thoracic aortic aneurysm. There is additional partially occlusive filling defect in the left lower lobe pulmonary artery. There is a filling defect in a right lower lobe segmental artery on image 2/2 of series 4. Findings are consistent with large pulmonary embolus on the left and small pulmonary embolus on the right.  RV/LV RATIO: 1.4 which is abnormally elevated.  LUNGS AND PLEURA: There is a large partially loculated left pleural effusion. There is masslike opacity in the posterior left lower lobe there is a 1.9 x 3 cm on image 61 of series 5. I cannot exclude a mass versus atelectasis. There is mild atelectasis in the right upper lobe the right lung is otherwise clear. There is atelectasis in the left lower lobe. MEDIASTINUM: The atelectasis or mass in the left upper lung extends to the left hilum no discrete lymphadenopathy is identified. No right hilar lymphadenopathy. No pericardial effusion. There is an elevated RV LV ratio suggesting possible right heart strain. The main pulmonary artery measures 3.9 cm in diameter and the aortic root also measures 3.9 cm.  LIMITED UPPER ABDOMEN: There is reflux of contrast into the inferior vena cava and hepatic veins which can be seen with elevated right heart pressure.  MUSCULOSKELETAL:  Negative.  Findings were called to Dr. Crabtree at 800 hours on 03/02/2018.  IMPRESSION:  CONCLUSION:  1.  Large filling defect in the left main pulmonary artery and left upper lobe artery was smaller filling defects in left lower lobe and right lower lobe segmental arteries consistent with a large left-sided pulmonary embolus in small right-sided pulmonary embolus. Less likely would be a mass invading the left pulmonary artery catheter and bilateral filling defects.  2.  Large partially loculated left pleural effusion. There is atelectasis or mass in the left upper lobe and atelectasis in the left lower lobe. I cannot exclude an underlying left lung cancer.  3.  Cardiomegaly with signs of possible right heart strain.    By:  Hugo Thomas MD, 10/23/2022  8:56 AM    Primary Care Physician:  Waqas Guthrie

## 2022-10-23 NOTE — PLAN OF CARE
Problem: Electrolyte Imbalance  Goal: Electrolyte Imbalance: Plan of Care  Outcome: Progressing     Problem: Gas Exchange Impaired  Goal: Optimal Gas Exchange  Outcome: Progressing   Goal Outcome Evaluation:                  Pt noted to have two runs of V-tach 5 and 6 beats.  Pt asymptomatic.  Labs obtained and K+ and Mg+ both low.  Pt placed on High K+ and High Mg+ protocols and will replaced per protocol.  Pt denies chest pain or feeling of palpitations.  Pt anxious for discharge Monday.

## 2022-10-23 NOTE — PROGRESS NOTES
Care Management Initial Consult    General Information  Assessment completed with: Patient,    Type of CM/SW Visit: Initial Assessment    Primary Care Provider verified and updated as needed:  (same clinic but whatever doctor is available)      Communication Assessment  Patient's communication style: spoken language (English or Bilingual)    Hearing Difficulty or Deaf: no   Wear Glasses or Blind: no    Cognitive  Cognitive/Neuro/Behavioral: WDL                      Living Environment:   People in home: spouse     Current living Arrangements: house      Able to return to prior arrangements:  Plans to stay with son     Family/Social Support:  Care provided by: self  Provides care for: no one  Marital Status:              Current Resources:   Patient receiving home care services: none   Community Resources:  none  Equipment currently used at home: none  Supplies currently used at home: none     Employment/Financial:  Employment Status: retired        Financial Concerns: No concerns identified      Functional Status:  Prior to admission patient needed assistance: independent      Mental Health Status:  Mental Health Status: No Current Concerns       Chemical Dependency Status:  Chemical Dependency Status: No Current Concerns                Care Management Discharge Note    Discharge Date: 10/23/2022     Discharge Disposition: Home    Discharge Services: None    Discharge DME: None    Discharge Transportation: family or friend will provide    Education Provided on the Discharge Plan:  AVS per bedside RN.    Persons Notified of Discharge Plans: patient    Patient/Family in Agreement with the Plan: yes      Additional Information:  Chart reviewed. CM met with patient and assessed. Patient will be staying with son at time of hospital discharge. Denied needs from CM. Family will transport home at time of hospital discharge.         Esther Ureña RN

## 2022-10-23 NOTE — PLAN OF CARE
Goal Outcome Evaluation:  Patient discharged home with AVS. Notified of medications to  at pharmacy. Will follow up with CT scan at 8 weeks and pulmonary. Vitals stable on RA. Son to  from hospital.

## 2022-10-25 ENCOUNTER — PATIENT OUTREACH (OUTPATIENT)
Dept: CARE COORDINATION | Facility: CLINIC | Age: 62
End: 2022-10-25

## 2022-10-25 NOTE — PROGRESS NOTES
Clinic Care Coordination Contact  [facility]/Voicemail       Clinical Data: Care Coordinator Outreach-TCM  Outreach attempted x 2.  Left message on patient's voicemail with call back information and requested return call.  Plan: Care Coordinator will make no further outreaches at this time.    KIM Rodas   Social Work Clinic Care Coordinator   [facility]  PH: [phone]  [email or web address]

## 2022-12-13 ENCOUNTER — HOSPITAL ENCOUNTER (OUTPATIENT)
Dept: CT IMAGING | Facility: HOSPITAL | Age: 62
Discharge: HOME | End: 2022-12-13
Attending: INTERNAL MEDICINE | Admitting: INTERNAL MEDICINE
Payer: COMMERCIAL

## 2022-12-13 DIAGNOSIS — I26.09 OTHER ACUTE PULMONARY EMBOLISM WITH ACUTE COR PULMONALE (H): Chronic | ICD-10-CM

## 2022-12-13 LAB
CREAT BLD-MCNC: 0.9 MG/DL (ref 0.6–1.1)
GFR SERPL CREATININE-BSD FRML MDRD: >60 ML/MIN/1.73M2

## 2022-12-13 PROCEDURE — 250N000011 HC RX IP 250 OP 636: Performed by: INTERNAL MEDICINE

## 2022-12-13 PROCEDURE — 82565 ASSAY OF CREATININE: CPT

## 2022-12-13 PROCEDURE — 71275 CT ANGIOGRAPHY CHEST: CPT

## 2022-12-13 RX ORDER — IOPAMIDOL 755 MG/ML
100 INJECTION, SOLUTION INTRAVASCULAR ONCE
Status: COMPLETED | OUTPATIENT
Start: 2022-12-13 | End: 2022-12-13

## 2022-12-13 RX ADMIN — IOPAMIDOL 100 ML: 755 INJECTION, SOLUTION INTRAVENOUS at 19:08

## 2023-03-03 ENCOUNTER — HOSPITAL ENCOUNTER (INPATIENT)
Facility: CLINIC | Age: 63
LOS: 3 days | Discharge: HOME | End: 2023-03-06
Attending: EMERGENCY MEDICINE | Admitting: HOSPITALIST
Payer: COMMERCIAL

## 2023-03-03 ENCOUNTER — APPOINTMENT (OUTPATIENT)
Dept: CT IMAGING | Facility: CLINIC | Age: 63
End: 2023-03-03
Attending: EMERGENCY MEDICINE
Payer: COMMERCIAL

## 2023-03-03 DIAGNOSIS — J96.01 ACUTE RESPIRATORY FAILURE WITH HYPOXIA (H): ICD-10-CM

## 2023-03-03 DIAGNOSIS — R93.1 DECREASED CARDIAC EJECTION FRACTION: Primary | Chronic | ICD-10-CM

## 2023-03-03 DIAGNOSIS — I50.9 ACUTE ON CHRONIC CONGESTIVE HEART FAILURE, UNSPECIFIED HEART FAILURE TYPE (H): Chronic | ICD-10-CM

## 2023-03-03 DIAGNOSIS — I48.0 PAROXYSMAL ATRIAL FIBRILLATION (H): Chronic | ICD-10-CM

## 2023-03-03 DIAGNOSIS — R79.89 ELEVATED TSH: ICD-10-CM

## 2023-03-03 DIAGNOSIS — I50.9 CONGESTIVE HEART FAILURE, UNSPECIFIED HF CHRONICITY, UNSPECIFIED HEART FAILURE TYPE (H): ICD-10-CM

## 2023-03-03 LAB
ANION GAP SERPL CALCULATED.3IONS-SCNC: 13 MMOL/L (ref 5–18)
BASOPHILS # BLD AUTO: 0.1 10E3/UL (ref 0–0.2)
BASOPHILS NFR BLD AUTO: 1 %
BNP SERPL-MCNC: 2506 PG/ML (ref 0–96)
BUN SERPL-MCNC: 30 MG/DL (ref 8–22)
CALCIUM SERPL-MCNC: 8.6 MG/DL (ref 8.5–10.5)
CHLORIDE BLD-SCNC: 108 MMOL/L (ref 98–107)
CO2 SERPL-SCNC: 17 MMOL/L (ref 22–31)
CREAT SERPL-MCNC: 1.08 MG/DL (ref 0.6–1.1)
EOSINOPHIL # BLD AUTO: 0 10E3/UL (ref 0–0.7)
EOSINOPHIL NFR BLD AUTO: 0 %
ERYTHROCYTE [DISTWIDTH] IN BLOOD BY AUTOMATED COUNT: 15.4 % (ref 10–15)
GFR SERPL CREATININE-BSD FRML MDRD: 58 ML/MIN/1.73M2
GLUCOSE BLD-MCNC: 101 MG/DL (ref 70–125)
HCT VFR BLD AUTO: 43.8 % (ref 35–47)
HGB BLD-MCNC: 14.7 G/DL (ref 11.7–15.7)
IMM GRANULOCYTES # BLD: 0 10E3/UL
IMM GRANULOCYTES NFR BLD: 0 %
LYMPHOCYTES # BLD AUTO: 1.1 10E3/UL (ref 0.8–5.3)
LYMPHOCYTES NFR BLD AUTO: 16 %
MAGNESIUM SERPL-MCNC: 1.8 MG/DL (ref 1.8–2.6)
MCH RBC QN AUTO: 28.8 PG (ref 26.5–33)
MCHC RBC AUTO-ENTMCNC: 33.6 G/DL (ref 31.5–36.5)
MCV RBC AUTO: 86 FL (ref 78–100)
MONOCYTES # BLD AUTO: 0.6 10E3/UL (ref 0–1.3)
MONOCYTES NFR BLD AUTO: 8 %
NEUTROPHILS # BLD AUTO: 5.2 10E3/UL (ref 1.6–8.3)
NEUTROPHILS NFR BLD AUTO: 75 %
NRBC # BLD AUTO: 0 10E3/UL
NRBC BLD AUTO-RTO: 0 /100
PLATELET # BLD AUTO: 286 10E3/UL (ref 150–450)
POTASSIUM BLD-SCNC: 4.1 MMOL/L (ref 3.5–5)
RBC # BLD AUTO: 5.1 10E6/UL (ref 3.8–5.2)
SODIUM SERPL-SCNC: 138 MMOL/L (ref 136–145)
TROPONIN I SERPL-MCNC: 0.07 NG/ML (ref 0–0.29)
TSH SERPL DL<=0.005 MIU/L-ACNC: 6.57 UIU/ML (ref 0.3–5)
WBC # BLD AUTO: 6.9 10E3/UL (ref 4–11)

## 2023-03-03 PROCEDURE — 71275 CT ANGIOGRAPHY CHEST: CPT

## 2023-03-03 PROCEDURE — 36415 COLL VENOUS BLD VENIPUNCTURE: CPT | Performed by: EMERGENCY MEDICINE

## 2023-03-03 PROCEDURE — 99223 1ST HOSP IP/OBS HIGH 75: CPT | Mod: AI | Performed by: HOSPITALIST

## 2023-03-03 PROCEDURE — 250N000011 HC RX IP 250 OP 636: Performed by: EMERGENCY MEDICINE

## 2023-03-03 PROCEDURE — 120N000001 HC R&B MED SURG/OB

## 2023-03-03 PROCEDURE — 99285 EMERGENCY DEPT VISIT HI MDM: CPT | Mod: 25

## 2023-03-03 PROCEDURE — 84145 PROCALCITONIN (PCT): CPT | Performed by: HOSPITALIST

## 2023-03-03 PROCEDURE — 84439 ASSAY OF FREE THYROXINE: CPT | Performed by: EMERGENCY MEDICINE

## 2023-03-03 PROCEDURE — 84484 ASSAY OF TROPONIN QUANT: CPT | Performed by: EMERGENCY MEDICINE

## 2023-03-03 PROCEDURE — 83735 ASSAY OF MAGNESIUM: CPT | Performed by: EMERGENCY MEDICINE

## 2023-03-03 PROCEDURE — 84443 ASSAY THYROID STIM HORMONE: CPT | Performed by: EMERGENCY MEDICINE

## 2023-03-03 PROCEDURE — 83880 ASSAY OF NATRIURETIC PEPTIDE: CPT | Performed by: EMERGENCY MEDICINE

## 2023-03-03 PROCEDURE — 36415 COLL VENOUS BLD VENIPUNCTURE: CPT | Performed by: HOSPITALIST

## 2023-03-03 PROCEDURE — 85025 COMPLETE CBC W/AUTO DIFF WBC: CPT | Performed by: EMERGENCY MEDICINE

## 2023-03-03 PROCEDURE — 80048 BASIC METABOLIC PNL TOTAL CA: CPT | Performed by: EMERGENCY MEDICINE

## 2023-03-03 PROCEDURE — 93005 ELECTROCARDIOGRAM TRACING: CPT | Performed by: EMERGENCY MEDICINE

## 2023-03-03 PROCEDURE — 250N000013 HC RX MED GY IP 250 OP 250 PS 637: Performed by: EMERGENCY MEDICINE

## 2023-03-03 RX ORDER — PROCHLORPERAZINE 25 MG
25 SUPPOSITORY, RECTAL RECTAL EVERY 12 HOURS PRN
Status: DISCONTINUED | OUTPATIENT
Start: 2023-03-03 | End: 2023-03-05

## 2023-03-03 RX ORDER — ONDANSETRON 4 MG/1
4 TABLET, ORALLY DISINTEGRATING ORAL EVERY 6 HOURS PRN
Status: DISCONTINUED | OUTPATIENT
Start: 2023-03-03 | End: 2023-03-06 | Stop reason: HOSPADM

## 2023-03-03 RX ORDER — LIDOCAINE 40 MG/G
CREAM TOPICAL
Status: DISCONTINUED | OUTPATIENT
Start: 2023-03-03 | End: 2023-03-06 | Stop reason: HOSPADM

## 2023-03-03 RX ORDER — ONDANSETRON 2 MG/ML
4 INJECTION INTRAMUSCULAR; INTRAVENOUS EVERY 6 HOURS PRN
Status: DISCONTINUED | OUTPATIENT
Start: 2023-03-03 | End: 2023-03-05

## 2023-03-03 RX ORDER — DOCUSATE SODIUM 100 MG/1
100 CAPSULE, LIQUID FILLED ORAL 2 TIMES DAILY PRN
Status: DISCONTINUED | OUTPATIENT
Start: 2023-03-03 | End: 2023-03-06 | Stop reason: HOSPADM

## 2023-03-03 RX ORDER — FUROSEMIDE 10 MG/ML
60 INJECTION INTRAMUSCULAR; INTRAVENOUS EVERY 8 HOURS
Status: DISCONTINUED | OUTPATIENT
Start: 2023-03-04 | End: 2023-03-04

## 2023-03-03 RX ORDER — LANOLIN ALCOHOL/MO/W.PET/CERES
3 CREAM (GRAM) TOPICAL
Status: DISCONTINUED | OUTPATIENT
Start: 2023-03-03 | End: 2023-03-06 | Stop reason: HOSPADM

## 2023-03-03 RX ORDER — IOPAMIDOL 755 MG/ML
75 INJECTION, SOLUTION INTRAVASCULAR ONCE
Status: COMPLETED | OUTPATIENT
Start: 2023-03-03 | End: 2023-03-03

## 2023-03-03 RX ORDER — PROCHLORPERAZINE MALEATE 10 MG
10 TABLET ORAL EVERY 6 HOURS PRN
Status: DISCONTINUED | OUTPATIENT
Start: 2023-03-03 | End: 2023-03-05

## 2023-03-03 RX ORDER — FUROSEMIDE 10 MG/ML
40 INJECTION INTRAMUSCULAR; INTRAVENOUS ONCE
Status: COMPLETED | OUTPATIENT
Start: 2023-03-03 | End: 2023-03-03

## 2023-03-03 RX ADMIN — NITROGLYCERIN 15 MG: 20 OINTMENT TOPICAL at 22:46

## 2023-03-03 RX ADMIN — FUROSEMIDE 40 MG: 10 INJECTION, SOLUTION INTRAVENOUS at 22:42

## 2023-03-03 RX ADMIN — IOPAMIDOL 75 ML: 755 INJECTION, SOLUTION INTRAVENOUS at 20:31

## 2023-03-03 ASSESSMENT — ACTIVITIES OF DAILY LIVING (ADL)
ADLS_ACUITY_SCORE: 35

## 2023-03-03 ASSESSMENT — ENCOUNTER SYMPTOMS
FREQUENCY: 0
SHORTNESS OF BREATH: 1
HEMATURIA: 0
FEVER: 0
COUGH: 0
DYSURIA: 0

## 2023-03-04 ENCOUNTER — APPOINTMENT (OUTPATIENT)
Dept: CARDIOLOGY | Facility: CLINIC | Age: 63
End: 2023-03-04
Attending: HOSPITALIST
Payer: COMMERCIAL

## 2023-03-04 ENCOUNTER — APPOINTMENT (OUTPATIENT)
Dept: OCCUPATIONAL THERAPY | Facility: CLINIC | Age: 63
End: 2023-03-04
Attending: HOSPITALIST
Payer: COMMERCIAL

## 2023-03-04 LAB
ANION GAP SERPL CALCULATED.3IONS-SCNC: 13 MMOL/L (ref 5–18)
ATRIAL RATE - MUSE: 84 BPM
BUN SERPL-MCNC: 35 MG/DL (ref 8–22)
CALCIUM SERPL-MCNC: 8.6 MG/DL (ref 8.5–10.5)
CHLORIDE BLD-SCNC: 103 MMOL/L (ref 98–107)
CO2 SERPL-SCNC: 21 MMOL/L (ref 22–31)
CREAT SERPL-MCNC: 1.11 MG/DL (ref 0.6–1.1)
DIASTOLIC BLOOD PRESSURE - MUSE: NORMAL MMHG
GFR SERPL CREATININE-BSD FRML MDRD: 56 ML/MIN/1.73M2
GLUCOSE BLD-MCNC: 117 MG/DL (ref 70–125)
HOLD SPECIMEN: NORMAL
INTERPRETATION ECG - MUSE: NORMAL
LVEF ECHO: NORMAL
MAGNESIUM SERPL-MCNC: 1.6 MG/DL (ref 1.8–2.6)
P AXIS - MUSE: 59 DEGREES
POTASSIUM BLD-SCNC: 3.2 MMOL/L (ref 3.5–5)
POTASSIUM BLD-SCNC: 3.5 MMOL/L (ref 3.5–5)
PR INTERVAL - MUSE: 208 MS
PROCALCITONIN SERPL-MCNC: 0.13 NG/ML (ref 0–0.49)
QRS DURATION - MUSE: 146 MS
QT - MUSE: 446 MS
QTC - MUSE: 527 MS
R AXIS - MUSE: 67 DEGREES
SODIUM SERPL-SCNC: 137 MMOL/L (ref 136–145)
SYSTOLIC BLOOD PRESSURE - MUSE: NORMAL MMHG
T AXIS - MUSE: 10 DEGREES
T4 FREE SERPL-MCNC: 1.35 NG/DL (ref 0.9–1.7)
VENTRICULAR RATE- MUSE: 84 BPM

## 2023-03-04 PROCEDURE — 250N000013 HC RX MED GY IP 250 OP 250 PS 637: Performed by: INTERNAL MEDICINE

## 2023-03-04 PROCEDURE — 97110 THERAPEUTIC EXERCISES: CPT | Mod: GO

## 2023-03-04 PROCEDURE — 97535 SELF CARE MNGMENT TRAINING: CPT | Mod: GO

## 2023-03-04 PROCEDURE — 83735 ASSAY OF MAGNESIUM: CPT | Performed by: INTERNAL MEDICINE

## 2023-03-04 PROCEDURE — 93306 TTE W/DOPPLER COMPLETE: CPT | Mod: 26 | Performed by: INTERNAL MEDICINE

## 2023-03-04 PROCEDURE — 97165 OT EVAL LOW COMPLEX 30 MIN: CPT | Mod: GO

## 2023-03-04 PROCEDURE — 36415 COLL VENOUS BLD VENIPUNCTURE: CPT | Performed by: INTERNAL MEDICINE

## 2023-03-04 PROCEDURE — 99254 IP/OBS CNSLTJ NEW/EST MOD 60: CPT | Mod: 25 | Performed by: INTERNAL MEDICINE

## 2023-03-04 PROCEDURE — 80048 BASIC METABOLIC PNL TOTAL CA: CPT | Performed by: HOSPITALIST

## 2023-03-04 PROCEDURE — 36415 COLL VENOUS BLD VENIPUNCTURE: CPT | Performed by: HOSPITALIST

## 2023-03-04 PROCEDURE — 84132 ASSAY OF SERUM POTASSIUM: CPT | Performed by: INTERNAL MEDICINE

## 2023-03-04 PROCEDURE — 99233 SBSQ HOSP IP/OBS HIGH 50: CPT | Performed by: INTERNAL MEDICINE

## 2023-03-04 PROCEDURE — 93306 TTE W/DOPPLER COMPLETE: CPT

## 2023-03-04 PROCEDURE — 250N000011 HC RX IP 250 OP 636: Performed by: HOSPITALIST

## 2023-03-04 PROCEDURE — 250N000011 HC RX IP 250 OP 636: Performed by: INTERNAL MEDICINE

## 2023-03-04 PROCEDURE — 120N000001 HC R&B MED SURG/OB

## 2023-03-04 RX ORDER — FUROSEMIDE 10 MG/ML
40 INJECTION INTRAMUSCULAR; INTRAVENOUS EVERY 12 HOURS
Status: DISCONTINUED | OUTPATIENT
Start: 2023-03-04 | End: 2023-03-05

## 2023-03-04 RX ORDER — NICOTINE 21 MG/24HR
1 PATCH, TRANSDERMAL 24 HOURS TRANSDERMAL DAILY
Status: DISCONTINUED | OUTPATIENT
Start: 2023-03-04 | End: 2023-03-06 | Stop reason: HOSPADM

## 2023-03-04 RX ORDER — METOPROLOL SUCCINATE 25 MG/1
25 TABLET, EXTENDED RELEASE ORAL DAILY
Status: DISCONTINUED | OUTPATIENT
Start: 2023-03-04 | End: 2023-03-06 | Stop reason: HOSPADM

## 2023-03-04 RX ORDER — POTASSIUM CHLORIDE 1500 MG/1
40 TABLET, EXTENDED RELEASE ORAL ONCE
Status: COMPLETED | OUTPATIENT
Start: 2023-03-04 | End: 2023-03-04

## 2023-03-04 RX ADMIN — FUROSEMIDE 40 MG: 10 INJECTION, SOLUTION INTRAVENOUS at 18:24

## 2023-03-04 RX ADMIN — METOPROLOL SUCCINATE 25 MG: 25 TABLET, EXTENDED RELEASE ORAL at 10:26

## 2023-03-04 RX ADMIN — POTASSIUM CHLORIDE 40 MEQ: 1500 TABLET, EXTENDED RELEASE ORAL at 12:59

## 2023-03-04 RX ADMIN — RIVAROXABAN 20 MG: 10 TABLET, FILM COATED ORAL at 10:26

## 2023-03-04 RX ADMIN — LOSARTAN POTASSIUM 12.5 MG: 25 TABLET, FILM COATED ORAL at 10:28

## 2023-03-04 RX ADMIN — FUROSEMIDE 60 MG: 10 INJECTION, SOLUTION INTRAVENOUS at 04:18

## 2023-03-04 ASSESSMENT — ACTIVITIES OF DAILY LIVING (ADL)
ADLS_ACUITY_SCORE: 35
DOING_ERRANDS_INDEPENDENTLY_DIFFICULTY: NO
WALKING_OR_CLIMBING_STAIRS_DIFFICULTY: NO
ADLS_ACUITY_SCORE: 35
ADLS_ACUITY_SCORE: 18
FALL_HISTORY_WITHIN_LAST_SIX_MONTHS: NO
DRESSING/BATHING_DIFFICULTY: NO
DIFFICULTY_EATING/SWALLOWING: NO
ADLS_ACUITY_SCORE: 35
TOILETING_ISSUES: NO
ADLS_ACUITY_SCORE: 35
WEAR_GLASSES_OR_BLIND: NO
CHANGE_IN_FUNCTIONAL_STATUS_SINCE_ONSET_OF_CURRENT_ILLNESS/INJURY: NO
CONCENTRATING,_REMEMBERING_OR_MAKING_DECISIONS_DIFFICULTY: NO
ADLS_ACUITY_SCORE: 18
HEARING_DIFFICULTY_OR_DEAF: NO
EQUIPMENT_CURRENTLY_USED_AT_HOME: GRAB BAR, TOILET;GRAB BAR, TUB/SHOWER
DIFFICULTY_COMMUNICATING: NO
ADLS_ACUITY_SCORE: 35
ADLS_ACUITY_SCORE: 18

## 2023-03-04 NOTE — PROGRESS NOTES
03/04/23 1115   Appointment Info   Signing Clinician's Name / Credentials (OT) Lizet Dubon, MOTR/L, CLT   Living Environment   People in Home spouse   Self-Care   Usual Activity Tolerance good   Current Activity Tolerance moderate   Regular Exercise No   Equipment Currently Used at Home grab bar, tub/shower;grab bar, toilet   Activity/Exercise/Self-Care Comment indep with all ADL   General Information   Onset of Illness/Injury or Date of Surgery 03/03/23   Referring Physician Dr. Correa   Patient/Family Therapy Goal Statement (OT) home today if possible   Additional Occupational Profile Info/Pertinent History of Current Problem mod:Heart failure; elevated TSH   Existing Precautions/Restrictions   (on 1LO2 currently, but not at baseline)   Cognitive Status Examination   Orientation Status orientation to person, place and time   Affect/Mental Status (Cognitive) WFL   Visual Perception   Visual Impairment/Limitations WFL   Sensory   Sensory Quick Adds sensation intact   Pain Assessment   Patient Currently in Pain No   Posture   Posture not impaired   Range of Motion Comprehensive   General Range of Motion no range of motion deficits identified   Strength Comprehensive (MMT)   General Manual Muscle Testing (MMT) Assessment no strength deficits identified   Muscle Tone Assessment   Muscle Tone Quick Adds No deficits were identified   Coordination   Upper Extremity Coordination No deficits were identified   Bed Mobility   Comment (Bed Mobility) SBA   Transfers   Transfer Comments SBA   Balance   Balance Comments not impaired   Activities of Daily Living   BADL Assessment/Intervention lower body dressing;toileting   Lower Body Dressing Assessment/Training   [place] Level (Lower Body Dressing) supervision   Toileting   [place] Level (Toileting) supervision   Clinical Impression   Criteria for Skilled Therapeutic Interventions Met (OT) Yes, treatment indicated   OT Diagnosis decr ADL indep/safety   OT Problem  List-Impairments impacting ADL activity tolerance impaired   Assessment of Occupational Performance 1-3 Performance Deficits   Identified Performance Deficits dressing, home mgmt, bathing   Planned Therapy Interventions (OT) ADL retraining;progressive activity/exercise   Clinical Decision Making Complexity (OT) low complexity   Risk & Benefits of therapy have been explained care plan/treatment goals reviewed;daughter   OT Total Evaluation Time   OT Eval, Low Complexity Minutes (68118) 10   OT Goals   Therapy Frequency (OT) One time eval and treatment   OT Predicted Duration/Target Date for Goal Attainment 03/04/23   OT Goals Lower Body Dressing;Toilet Transfer/Toileting   OT: Lower Body Dressing Modified independent;within precautions;Goal Met;Completed   OT: Toilet Transfer/Toileting Modified independent;within precautions;Goal Met;Completed;using adaptive equipment;cleaning and garment management   Interventions   Interventions Quick Adds Self-Care/Home Management;Therapeutic Procedures/Exercise   Self-Care/Home Management   Self-Care/Home Mgmt/ADL, Compensatory, Meal Prep Minutes (84232) 15   Symptoms Noted During/After Treatment (Meal Preparation/Planning Training) fatigue   Treatment Detail/Skilled Intervention Educ pt in PLB tech for use during ADL, gave handout and answered all questions. Pt completed all transfers including bed, toilet,  with mod I/cues for hand placement/tech after instruction. Bed mobility with mod I. Grooming tasks in standing and toileting with mod I after set-up. Minimal SOB noted at this time on RA, but pt understood PLB and demo'd functional use of tech.   Therapeutic Procedures/Exercise   Therapeutic Procedure: strength, endurance, ROM, flexibillity minutes (06091) 10   Symptoms Noted During/After Treatment none   Treatment Detail/Skilled Intervention Educated pt in proper use of calisthenics, including when, how, and what amount of time to perform. Pt completed handout of 7  exercises with min v/c s and approximately 30sec to1 min of exercise and basically no break in-between each exercise, except to explain exercise. Pt demo d and verbalized understanding for HEP.   OT Discharge Planning   OT Plan OT d/c   OT Discharge Recommendation (DC Rec) (S)  home with assist   OT Rationale for DC Rec may need assist for I/ADL   OT Brief overview of current status mod I for all ADL   Total Session Time   Timed Code Treatment Minutes 25   Total Session Time (sum of timed and untimed services) 35

## 2023-03-04 NOTE — PROGRESS NOTES
Pt a&Ox4. Able to makes needs known. On continues I/O. Potassium replaced at 1200 per protocol . Re-ckeck lab ordered for 1700. Able to wean patient off oxygen. Sats 90%-93% RA. VSS.  Medication administered per order. Will continue to monitor.

## 2023-03-04 NOTE — ED TRIAGE NOTES
Patient presents to ED with SOB that has been ongoing for the past 4 weeks, hx of PE, taking Xarelto, denies pain, was satting @86% on RA upon arrival.  Brittni Acharya RN.......3/3/2023 7:00 PM     Triage Assessment     Row Name 03/03/23 4088       Triage Assessment (Adult)    Airway WDL WDL       Respiratory WDL    Respiratory WDL X  SOB       Skin Circulation/Temperature WDL    Skin Circulation/Temperature WDL WDL       Cardiac WDL    Cardiac WDL WDL       Peripheral/Neurovascular WDL    Peripheral Neurovascular WDL WDL       Cognitive/Neuro/Behavioral WDL    Cognitive/Neuro/Behavioral WDL WDL

## 2023-03-04 NOTE — PROGRESS NOTES
[facility]    Medicine Progress Note - Hospitalist Service    Date of Admission:  3/3/2023    Assessment & Plan   Kallie Barker 63-year-old female with history of paroxysmal A-fib, heart failure with reduced EF, pulmonary embolism in 2022, Graves' disease, thyrotoxicosis, active tobacco abuse came into ER with a complaint of progressive worsening of shortness of breath for several weeks.  On arrival to ER patient was noted to be hypoxic requiring oxygen supplementation.  Work-up revealed acute on chronic heart failure with reduced EF.  CT chest cardiomegaly and pulmonary interstitial edema, small to medium size pericardial effusion, mildly enlarged mediastinal lymph node, mild emphysema    Assessment and plan  #Acute hypoxic respiratory failure  #Acute on chronic heart failure with reduced EF  #Pericardial effusion on CT chest.  #Paroxysmal A-fib currently in sinus rhythm  Echo 3/4 EF 30 to 35% severe left atrial enlargement.  Moderate TR small pericardial effusion without tamponade physiology by echocardiogram.  Appreciate cardiology input.  IV Lasix 40 twice daily, will add metoprolol XL 25 mg daily.  Cardiology recommended losartan with plan of initiation of Entresto.  Continue anticoagulation with Xarelto  Strict RON's, daily weight.  Keep potassium above 4, magnesium above 2.    #History of pulmonary embolism in 2022  Anticoagulation with Xarelto.    #Graves' disease, thyrotoxicosis.  She was last seen by endocrine in 2018 recommendations were to continue Tapazole 10 mg once a day for at least 1 year if she is rebound into hypothyroid and then radioactive iodine treatment was recommended.  TSH 6.5  Will hold methimazole.  She will need a referral for endocrine at time of discharge.    #Active tobacco abuse  Counseled regarding cessation.  Nicotine patch.  Tobacco cessation may be difficult as her  also smokes inside the house.       Diet: 2 Gram Sodium Diet Other - please  "comment    DVT Prophylaxis: DOAC  Marlow Catheter: Not present  Lines: None     Cardiac Monitoring: ACTIVE order. Indication: Acute decompensated heart failure (48 hours)  Code Status:  Full Code    Clinically Significant Risk Factors Present on Admission        # Hypokalemia: Lowest K = 3.2 mmol/L in last 2 days, will replace as needed     # Hypomagnesemia: Lowest Mg = 1.6 mg/dL in last 2 days, will replace as needed    # Drug Induced Coagulation Defect: home medication list includes an anticoagulant medication     # Acute systolic heart failure: last echo with EF <40% and receiving IV diuretics     # Overweight: Estimated body mass index is 26.61 kg/m  as calculated from the following:    Height as of this encounter: 1.626 m (5' 4\").    Weight as of this encounter: 70.3 kg (155 lb).           Disposition Plan  Continue diuresis.     Expected Discharge Date: 03/05/2023                  Marifer Barraza MD  Hospitalist Service  [facility]  Securely message with [employer] (more info)  Text page via [facility] Paging/Directory      Disclaimer: This note consists of symbols derived from keyboarding, dictation and/or voice recognition software. As a result, there may be errors in the script that have gone undetected. Please consider this when interpreting information found in this chart.    ______________________________________________________________________    Interval History   Patient seen and examined this morning in the ER.  She reports that she is a lot better since being in the hospital.  She has not gotten out of the bed yet.  Denies any chest pain, nausea, vomiting, fever, chills.    Physical Exam   Vital Signs: Temp: 98.4  F (36.9  C) Temp src: Temporal BP: 121/80 Pulse: 73   Resp: 16 SpO2: 91 % O2 Device: None (Room air) Oxygen Delivery: 2 LPM  Weight: 155 lbs 0 oz    Constitutional: awake, alert, cooperative, no apparent distress, and appears stated age  Eyes: extra-ocular muscles " intact  Respiratory: tachypneic, Mild respiratory distress and crackles right base and left base  Cardiovascular: regular rate and rhythm, normal S1 and S2, S3 present, no murmur noted and no edema  GI: No scars, normal bowel sounds, soft, non-distended, non-tender, no masses palpated, no hepatosplenomegally  Skin: no bruising or bleeding  Musculoskeletal: no lower extremity pitting edema present  Neurologic: Alert oriented x3, no focal neurodeficit noted    Medical Decision Making       60 MINUTES SPENT BY ME on the date of service doing chart review, history, exam, documentation & further activities per the note.      Data     I have personally reviewed the following data over the past 24 hrs:    6.9  \   14.7   / 286     137 103 35 (H) /  117   3.2 (L) 21 (L) 1.11 (H) \       Trop: N/A BNP: 2,506 (H)       TSH: 6.57 (H) T4: 1.35 A1C: N/A       Procal: 0.13 CRP: N/A Lactic Acid: N/A         Imaging results reviewed over the past 24 hrs:   Recent Results (from the past 24 hour(s))   CT Chest Pulmonary Embolism w Contrast    Narrative    EXAM: CT CHEST PULMONARY EMBOLISM W CONTRAST  LOCATION: [facility]  DATE/TIME: 3/3/2023 8:44 PM    INDICATION: Hypoxia, history of pulmonary embolus  COMPARISON: 12/13/2022  TECHNIQUE: CT chest pulmonary angiogram during arterial phase injection of IV contrast. Multiplanar reformats and MIP reconstructions were performed. Dose reduction techniques were used.   CONTRAST: Isovue 370 75mL    FINDINGS:  ANGIOGRAM CHEST: Flow-related artifact in the pulmonary arteries, particularly centrally. No pulmonary artery filling defects. The main pulmonary artery is dilated up to 4 cm. Normal caliber aorta.    LUNGS AND PLEURA: Mild interlobular septal thickening. Mild emphysema and bronchial wall thickening. Patchy areas of atelectasis and scarring. No effusions. Small volume airway secretions.    MEDIASTINUM/AXILLAE: Cardiomegaly. Small to medium-sized pericardial  effusion. Reflux of contrast into the dilated IVC and hepatic veins. There are a few borderline to mildly enlarged mediastinal lymph nodes with the largest in the right hilum measuring   1.7 x 1.8 cm (series 5/101) and in the right upper paratracheal chain measuring 1.7 x 1.4 cm (series 5/40).    CORONARY ARTERY CALCIFICATION: Mild.    UPPER ABDOMEN: No significant finding.    MUSCULOSKELETAL: Small Schmorl's node and chronic central mild compression deformity of T12 is unchanged.      Impression    IMPRESSION:  1.  No pulmonary embolus.  2.  Cardiomegaly and pulmonary interstitial edema. Superimposed infection is in the differential.  3.  Small to medium-sized pericardial effusion.  4.  Mildly enlarged mediastinal lymph nodes may be reactive. Continued attention on follow-up.  5.  Mild emphysema.  6.  Findings suggestive of pulmonary hypertension.   Echocardiogram Complete   Result Value    LVEF  30-35%    [facility]    [id number]  [id number]  [id number]  [id number]^BRYANT^AROLDO^[place] [address]     Name: DANIELA BOONE  MRN: [id number]  : 1960  Study Date: 2023 08:14 AM  Age: 63 yrs  Gender: Female  Patient Location: [facility]  Reason For Study: Heart Failure  Ordering Physician: AROLDO HURTADO  Performed By: MB     BSA: 1.8 m2  Height: 64 in  Weight: 155 lb  HR: 65  BP: 116/60 mmHg  ______________________________________________________________________________  Procedure  Complete Echo Adult.  ______________________________________________________________________________  Interpretation Summary     Normal left-ventricular size. Left ventricular systolic function is moderately  globally reduced. Left ventricular ejection fraction estimated at 30 to  35%.Diastolic Doppler findings (E/E' ratio and/or other parameters) suggest  left ventricular filling pressures are increased.  Mild to moderate right ventricular enlargement. Mild decrease in RV systolic  function  suggested.  Severe left atrial enlargement. Moderate to severe right atrial enlargement.  Myxomatous changes of the mitral valve. Eccentric mitral regurgitation that  appears moderately severe.(2-3+)  Moderate tricuspid regurgitation. Estimate of RV systolic pressure is normal  at 24 mmHg possible atrial pressure.  Mild aortic insufficiency  IVC diameter >2.1 cm collapsing <50% with sniff suggests a high RA pressure  estimated at 15 mmHg or greater.  Small pericardial effusion. Measures approximate 1.1 cm posteriorly. No  suggestion of tamponade physiology by echocardiography.  Compared to the study October 2022. Heart rate is slower and now in sinus  rhythm. Estimate of LV systolic function is similar.Smalll pericardial  effsuion appears unchanged  ______________________________________________________________________________  Left Ventricle  The left ventricle is normal in size. There is normal left ventricular wall  thickness. The visual ejection fraction is 30-35%. Diastolic Doppler findings  (E/E' ratio and/or other parameters) suggest left ventricular filling  pressures are increased. There is moderate global hypokinesia of the left  ventricle.     Right Ventricle  The right ventricle is mild to moderately dilated. Moderately decreased right  ventricular systolic function. TAPSE is abnormal, which is consistent with  abnormal right ventricular systolic function.     Atria  The left atrium is severely dilated. The right atrium is moderate to severely  dilated.     Mitral Valve  The mitral valve leaflets appear thickened, but open well. The mitral leaflets  appear thickened, hooded, and/or redundant, consistent with myxomatous  degeneration. The mitral regurgitant jet is eccentrically directed. There is  moderate to mod-severe (2-3+) mitral regurgitation.     Tricuspid Valve  The tricuspid valve is not well visualized, but is grossly normal. The right  ventricular systolic pressure is approximated at 24.2 mmHg  plus the right  atrial pressure. There is moderate (2+) tricuspid regurgitation.     Aortic Valve  The aortic valve is trileaflet with aortic valve sclerosis. There is mild (1+)  aortic regurgitation. No hemodynamically significant valvular aortic stenosis.     Pulmonic Valve  The pulmonic valve is not well seen, but is grossly normal. There is trace  pulmonic valvular regurgitation.     Vessels  The aorta root is normal. Normal size ascending aorta. IVC diameter >2.1 cm  collapsing <50% with sniff suggests a high RA pressure estimated at 15 mmHg or  greater.     Pericardium  Small pericardial effusion. The pericardial effusion measures approximately  1.1 cm posteriorly.     ______________________________________________________________________________  MMode/2D Measurements & Calculations  IVSd: 1.1 cm  LVIDd: 5.1 cm  LVIDs: 4.2 cm  LVPWd: 0.91 cm  FS: 18.0 %     LV mass(C)d: 188.7 grams  LV mass(C)dI: 107.5 grams/m2  Ao root diam: 3.4 cm  asc Aorta Diam: 3.5 cm  LVOT diam: 2.0 cm  LVOT area: 3.3 cm2  LA Volume Indexed (AL/bp): 71.7 ml/m2  RWT: 0.36     Time Measurements  MM HR: 62.0 BPM     Doppler Measurements & Calculations  MV E max iraj: 97.5 cm/sec  MV A max iraj: 28.6 cm/sec  MV E/A: 3.4  MV max P.9 mmHg  MV mean P.3 mmHg  MV V2 VTI: 29.5 cm  MVA(VTI): 1.3 cm2  MV dec slope: 710.0 cm/sec2  MV dec time: 0.14 sec  Ao V2 max: 97.7 cm/sec  Ao max P.0 mmHg  Ao V2 mean: 63.4 cm/sec  Ao mean P.9 mmHg  Ao V2 VTI: 15.6 cm  JOSE(I,D): 2.5 cm2  JOSE(V,D): 2.6 cm2  AI P1/2t: 574.9 msec  LV V1 max P.3 mmHg  LV V1 max: 76.6 cm/sec  LV V1 VTI: 12.1 cm  SV(LVOT): 39.5 ml  SI(LVOT): 22.5 ml/m2  PA acc time: 0.10 sec  TR max iraj: 246.1 cm/sec  TR max P.2 mmHg  AV Iraj Ratio (DI): 0.78  JOSE Index (cm2/m2): 1.4  E/E': 24.4  E/E' av.0  Lateral E/e': 11.5  Medial E/e': 24.5     Peak E' Iraj: 4.0 cm/sec     ______________________________________________________________________________  Report approved by:  Prakash Ramsay 03/04/2023 10:10 AM

## 2023-03-04 NOTE — H&P
"[facility] MEDICINE ADMISSION HISTORY AND PHYSICAL     Brief Synopsis:     Kallie Barker is a 63 year old female who presented with complaints of shortness of breath.    Medical history is notable for heart failure with reduced ejection fraction, PE, Graves' disease, persistent atrial fibrillation.    Initial evaluation revealed oxygen saturation of 86% in room air.  Blood pressure elevated, normal heart rate.  Creatinine of 1.1, bicarb of 17, BNP of 2500, TSH of 6.7, normal CBC.  CT chest negative for PE but did show likely CHF, mediastinal adenopathy, small to medium sized pericardial effusion.  EKG with sinus rhythm, right bundle branch block.    Echocardiogram last year with ejection fraction of 35 to 40%, moderate mitral regurgitation.    Initial treatment included 40 mg IV Lasix, nitro ointment.    Assessment and Plan:  Acute congestive heart failure  Chronic congestive heart failure with reduced ejection fraction  Acute respiratory failure with hypoxia  Paroxysmal atrial fibrillation, on Xarelto, not on rate control  History of PE, anticoagulated  At least moderate mitral regurgitation  Continue IV Lasix  Check echocardiogram tomorrow  Monitor on telemetry, wonder if she is having intermittent A-fib with RVR  Supplemental oxygen as needed  Continue nitro ointment  Check procalcitonin given questionable infection by CT, mediastinal lymph nodes    Thyroid disease  TSH mildly elevated, check T4  On methimazole    Nicotine dependence  She declines nicotine replacement    Clinically Significant Risk Factors Present on Admission               # Drug Induced Coagulation Defect: home medication list includes an anticoagulant medication     # Chronic systolic heart failure: echo within the past year with EF <40%      # Overweight: Estimated body mass index is 26.61 kg/m  as calculated from the following:    Height as of this encounter: 1.626 m (5' 4\").    Weight as of this encounter: 70.3 " kg (155 lb).             DVTP: Direct Oral Anticagulants   Code Status: Prior  Disposition: Inpatient   Diet: Low-salt  Fluids: None    Disposition Plan      Expected Discharge Date: 2023               Chief Complaint  shortness of breath     HISTORY   Kallie Barker is a 63 year old female who presented with complaints of dyspnea.    Per ED provider:  Kallie Barker is a 63 year old female with a pertinent history of paroxysmal atrial fibrillation, CHF, PE (, ), DVT, pleural effusion, Graves' disease, thyrotoxicosis, tobacco use, who presents to this ED by private car accompanied by  for evaluation of shortness of breath.     Patient presents with persistent shortness of breath for the past several weeks, worse with exertion. As a result, she has had difficulty moving around. At times, she has some bladder incontinence when she is particularly short of breath. No recent illness or medication changes. History of PE in  and . Currently on Xarelto. She missed 1-2 doses since she started feeling unwell in the past few weeks. History of atrial fibrillation and hyperthyroidism (on methimazole). Denies chest pain, cough, congestion, fever, leg swelling, urinary symptoms.     She denies any chest pain, fever, chills, cough.  Slight abdominal discomfort, no vomiting, hematemesis, diarrhea, melena or hematochezia lately.  No dysuria.  No lower extremity edema.  Past Medical History     No past medical history on file.     Surgical History     Past Surgical History:   Procedure Laterality Date     APPENDECTOMY       CARDIOVERSION  2018     PICC  3/13/2018          Family History    No family history on file.   Social History      Social History     Tobacco Use     Smoking status: Former     Packs/day: 1.00     Years: 30.00     Pack years: 30.00     Types: Cigarettes     Quit date: 2018     Years since quittin.0     Smokeless tobacco: Never   Substance Use Topics     Alcohol use:  No     Drug use: No      Allergies   No Known Allergies  Prior to Admission Medications      Prior to Admission Medications   Prescriptions Last Dose Informant Patient Reported? Taking?   furosemide (LASIX) 40 MG tablet   No No   Sig: Take 1 tablet (40 mg) by mouth daily Water pill for heart.   methimazole (TAPAZOLE) 5 MG tablet   Yes No   Sig: Take 10 mg by mouth daily   multivitamin therapeutic tablet   Yes No   Sig: [MULTIVITAMIN THERAPEUTIC TABLET] Take 1 tablet by mouth daily.   potassium chloride ER (KLOR-CON M) 10 MEQ CR tablet   No No   Sig: Take 2 tablets (20 mEq) by mouth daily (Replace potassium while on furosemide- water pill   rivaroxaban ANTICOAGULANT (XARELTO) 15 MG TABS tablet   No No   Sig: Take 1 tablet (15 mg) by mouth 2 times daily (with meals) For blood clot lung- after 21 days then decrease dose to 20 mg once daily   rivaroxaban ANTICOAGULANT (XARELTO) 20 MG TABS tablet   No No   Sig: Take 1 tablet (20 mg) by mouth daily (with dinner) (Blood thinner)- need to take indefinitely to prevent blood clots      Facility-Administered Medications: None      Review of Systems     A 12 point comprehensive review of systems was negative except as noted above in HPI.    PHYSICAL EXAMINATION     Vitals      Temp:  [97  F (36.1  C)] 97  F (36.1  C)  Pulse:  [74-86] 84  Resp:  [15-29] 18  BP: (145-183)/() 183/73  SpO2:  [86 %-100 %] 100 %    Examination   Physical Exam:    Gen: no acute distress, comfortable, alert, pleasant  ENT: no scleral icterus  Pulm: Bibasilar crackles, slightly worse on the left, no wheezing, no retractions  CV: regular rate and rhythm, occasional ectopy, no significant pitting edema  GI: abdomen is soft, non-tender, non-distended with active bowel sounds.  MSK: no obvious deformities of the extremities  Derm: Not pale, no jaundice  Psych: appropriate affect      Pertinent Radiology     Radiology Results:   Recent Results (from the past 24 hour(s))   CT Chest Pulmonary Embolism w  Contrast    Narrative    EXAM: CT CHEST PULMONARY EMBOLISM W CONTRAST  LOCATION: [facility]  DATE/TIME: 3/3/2023 8:44 PM    INDICATION: Hypoxia, history of pulmonary embolus  COMPARISON: 12/13/2022  TECHNIQUE: CT chest pulmonary angiogram during arterial phase injection of IV contrast. Multiplanar reformats and MIP reconstructions were performed. Dose reduction techniques were used.   CONTRAST: Isovue 370 75mL    FINDINGS:  ANGIOGRAM CHEST: Flow-related artifact in the pulmonary arteries, particularly centrally. No pulmonary artery filling defects. The main pulmonary artery is dilated up to 4 cm. Normal caliber aorta.    LUNGS AND PLEURA: Mild interlobular septal thickening. Mild emphysema and bronchial wall thickening. Patchy areas of atelectasis and scarring. No effusions. Small volume airway secretions.    MEDIASTINUM/AXILLAE: Cardiomegaly. Small to medium-sized pericardial effusion. Reflux of contrast into the dilated IVC and hepatic veins. There are a few borderline to mildly enlarged mediastinal lymph nodes with the largest in the right hilum measuring   1.7 x 1.8 cm (series 5/101) and in the right upper paratracheal chain measuring 1.7 x 1.4 cm (series 5/40).    CORONARY ARTERY CALCIFICATION: Mild.    UPPER ABDOMEN: No significant finding.    MUSCULOSKELETAL: Small Schmorl's node and chronic central mild compression deformity of T12 is unchanged.      Impression    IMPRESSION:  1.  No pulmonary embolus.  2.  Cardiomegaly and pulmonary interstitial edema. Superimposed infection is in the differential.  3.  Small to medium-sized pericardial effusion.  4.  Mildly enlarged mediastinal lymph nodes may be reactive. Continued attention on follow-up.  5.  Mild emphysema.  6.  Findings suggestive of pulmonary hypertension.     EKG Results: personally reviewed.     Nikita Marin DO  [facility]  [facility]   Phone: #[phone]

## 2023-03-04 NOTE — PHARMACY-ADMISSION MEDICATION HISTORY
Pharmacy Note - Admission Medication History    Pertinent Provider Information:    ______________________________________________________________________    Prior To Admission (PTA) med list completed and updated in EMR.       PTA Med List   Medication Sig Last Dose     methimazole (TAPAZOLE) 5 MG tablet Take 10 mg by mouth daily 3/3/2023     multivitamin therapeutic tablet [MULTIVITAMIN THERAPEUTIC TABLET] Take 1 tablet by mouth daily. 3/3/2023     rivaroxaban ANTICOAGULANT (XARELTO) 20 MG TABS tablet Take 1 tablet (20 mg) by mouth daily (with dinner) (Blood thinner)- need to take indefinitely to prevent blood clots 3/2/2023       Information source(s): Patient    Method of interview communication: in-person    Patient was asked about OTC/herbal products specifically.  PTA med list reflects this.    Based on the pharmacist's assessment, the PTA med list information appears reliable    Medication Affordability:       Allergies were reviewed, assessed, and updated with the patient.      Patient does not use any multi-dose medications prior to admission.     Thank you for the opportunity to participate in the care of this patient.      Katy Lagunas RPH     3/4/2023     8:47 AM

## 2023-03-04 NOTE — CONSULTS
HEART CARE CONSULTATON NOTE            Reason for consult: 63-year-old female who presented with complaints of dyspnea to the emergency room.  History of paroxysmal atrial fibrillation, Graves' disease, history of reduced left ventricular dysfunction, history of pulmonary embolism.     Assessment:   1.  Congestive heart failure with prior documentation of reduced left ventricular function.  As noted patient has not been seen by cardiology since 2018 when she saw Dr. Gonzales.  She has a history of biventricular congestive heart failure with an ejection fraction as low as 20% at that time improved to 82% by report with control of atrial fibrillation.  In addition it was noted that right ventricular function improved with treatment of pulmonary embolism at that time.  She did not have additional follow-up with cardiology.  She was admitted in October with an acute pulmonary embolism with an echocardiogram at that time reporting an EF of 35 to 40% and a small pericardial effusion..  She has a history of Graves' disease.  Patient now admitted with hypoxic respiratory insufficiency.  CT did not suggest recurrence of pulmonary embolism did suggest a pulmonary interstitial edema with a BNP of 2506.  Troponin is 0.07.  TSH 6.57.  She has been administered IV furosemide currently scheduled to 60 mg every 8 hours.  Urine output recorded is -500 cc thus far.  Laboratory on admission demonstrated elevated BNP, normal potassium, elevated BUN with creatinine of 1.08.  Patient reports feeling significantly better.    2.  Pericardial effusion.  CT mentions a small to medium sized pericardial effusion.  Previous echocardiogram in October reported a small pericardial effusion in the setting of reduced ejection fraction and acute pulmonary embolism.  Plan for repeat echocardiogram this morning.  Current vital signs are stable with heart rates in the 60s and 70s and blood pressure most recent 116/60.  Respiratory rate is 12.    3.   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  Reported 3+.  Repeat echocardiogram requested and pending.    4.  History of pulmonary embolism.  Significant pulmonary embolism burden by report October 2022.  This was described in the left main pulmonary artery with some findings of right heart strain.  There is no been no interval follow-up echocardiogram.  It does not appear the patient followed up with pulmonary since discharge is also recommended.  Patient did indicate that she missed a few doses of Xarelto.    5.  History of Graves' disease on methimazole.  Home medications included only methimazole, multivitamin and Xarelto.     Plan:   1.  Echocardiogram as soon as possible this morning.  2.  Repeat chemistries this morning basic metabolic profile, magnesium  3.  Monitor I's and O's closely  4.  Change furosemide to 40 mg IV every 12 hours with parameters given significant clinical improvement already.  5.  Add losartan we will start at 12.5 mg daily.  If tolerates losartan and blood pressure adequate we could consider transitioning to Entresto.  Losartan  initiated instead of lisinopril with the potential for transitioning to Entresto.  Patient placed on metoprolol by the admitting physician.  We will discontinue nitroglycerin ointment.  Consider spironolactone pending additional observation.  6.  Discussed the importance of close follow-up.  Patient needs to establish both in cardiology clinic and with primary care.  7.  Discussed the importance of tobacco cessation.     History:      HPI: 63-year-old female with a history of paroxysmal atrial fibrillation, congestive heart failure with reduced ejection fraction, pulmonary embolism by report in 2022 in 2018, DVT, history of pleural effusion, history of Graves' disease, history of thyrotoxicosis, history of tobacco use who presents to the emergency room yesterday for increasing shortness of breath.  Chart note reviewed with  reported increasing shortness of breath over the past several weeks worse with exertion.  She has been on Xarelto but missed a few doses due to feeling unwell in the past few weeks.  She has a history of atrial fibrillation and hyperthyroidism on methimazole.  Per chart note in October 2022 patient presented with atrial fibrillation with rapid ventricular response and found to have an acute pulmonary embolism on CT scan with significant PE.  Patient has previously been seen by Dr. Gonzales.  In 2018 she had an ejection fraction of 20% with a repeat echocardiogram March 2018 of an EF of 52%.  Most recently she was hospitalized in October with an acute PE as noted and had an echocardiogram with a EF was 35 to 40%.  She had by report moderate to severe mitral regurgitation and moderate tricuspid regurgitation and a small pericardial effusion.  Patient was not seen by cardiology during that admission.  As noted she was seen both by Dr. Gonzales amd in the A-fib clinic in 2019 with persistent atrial fibrillation without recurrence at that time since cardioversion.  She was however noted to be in atrial fibrillation during the recent hospitalization.  Patient was to follow-up with Dr. Gonzales in 2019 which was not pursued.    Patient reports increased dyspnea primarily with exertion over the past 2 weeks but also noted that she had a prop her self up in bed to breathe.  She states that typically she can walk approximately a quarter block but can walk stairs in her home but noted that it became more difficult to walk up the stairs.  She reports feeling significantly better already with the treatment rendered in the emergency room.  She denies chest discomfort.  She does have awareness of a faster heart rhythm but primarily with activity.  She is not certain as to whether she has been back in atrial fibrillation.    Cardiovascular risk factors: Positive for ongoing tobacco use, negative for known hypertension or diabetes.  Lipids  are not known.  Positive for family history and she reports that her mother had valve replacement and aortic aneurysm repair.  No known coronary disease history.  Alcohol intake is infrequent.    Past medical history  Persistent atrial fibrillation  History of recurrent pulmonary embolism most recently 2022.  History by report of pulmonary infarction.  History of Graves' disease.  History of acute on chronic systolic heart failure.  Echocardiogram from October reported an EF of 35 to 40%.  Patient has been followed in cardiology but not since   History of hyperthyroidism.  History of atrial fibrillation with increased ventricular response  History of chronic tobacco use.  History of pericardial effusion  Past Surgical History:   Procedure Laterality Date     APPENDECTOMY       CARDIOVERSION  2018     PICC  3/13/2018           Social History     Socioeconomic History     Marital status:      Spouse name: Not on file     Number of children: Not on file     Years of education: Not on file     Highest education level: Not on file   Occupational History     Not on file   Tobacco Use     Smoking status: Former     Packs/day: 1.00     Years: 30.00     Pack years: 30.00     Types: Cigarettes     Quit date: 2018     Years since quittin.0     Smokeless tobacco: Never   Substance and Sexual Activity     Alcohol use: No     Drug use: No     Sexual activity: Not on file   Other Topics Concern     Not on file   Social History Narrative     Not on file     Social Determinants of Health     Financial Resource Strain: Not on file   Food Insecurity: Not on file   Transportation Needs: Not on file   Physical Activity: Not on file   Stress: Not on file   Social Connections: Not on file   Intimate Partner Violence: Not on file   Housing Stability: Not on file     No family history on file.  No Known Allergies  Current Outpatient Medications   Medication Sig Dispense Refill     methimazole (TAPAZOLE) 5  "MG tablet Take 10 mg by mouth daily       multivitamin therapeutic tablet [MULTIVITAMIN THERAPEUTIC TABLET] Take 1 tablet by mouth daily.       rivaroxaban ANTICOAGULANT (XARELTO) 20 MG TABS tablet Take 1 tablet (20 mg) by mouth daily (with dinner) (Blood thinner)- need to take indefinitely to prevent blood clots 30 tablet 0         Review of Systems  All pertinent positives and negatives reviewed in History.  All other 10 point review of systems reviewed and negative.      Objective:    Physical Exam  VITALS: /60   Pulse 66   Temp 97  F (36.1  C) (Temporal)   Resp 12   Ht 1.626 m (5' 4\")   Wt 70.3 kg (155 lb)   SpO2 99%   BMI 26.61 kg/m    BMI: Body mass index is 26.61 kg/m .  Wt Readings from Last 3 Encounters:   03/03/23 70.3 kg (155 lb)   10/23/22 74 kg (163 lb 3.2 oz)   10/01/18 58.1 kg (128 lb)       Intake/Output Summary (Last 24 hours) at 3/4/2023 0854  Last data filed at 3/4/2023 0326  Gross per 24 hour   Intake 500 ml   Output 1000 ml   Net -500 ml     General Appearance:  Alert, cooperative, no distress, appears stated age   HEENT:  Normocephalic, without obvious abnormality, missing teeth, poor dentition   Neck: Supple, symmetrical, trachea midline, no appreciated carotid bruit, jugular venous pressure appears mildly increased.   Back:   Symmetric,   Lungs:    Mildly diminished at the bases, respirations unlabored   Chest Wall:  No tenderness or deformity   Heart:  Regular rate and rhythm, S1, S2 normal soft systolic murmur, S4, no appreciated rales   Abdomen:   Soft, non-tender, bowel sounds active,  no masses, no organomegaly   Extremities: Extremities normal, atraumatic, no cyanosis or edema   Skin: Skin color, texture, turgor normal, no rashes or lesions   Neurologic: Alert and oriented X 3, Moves all 4 extremities     Cardiographics  ECG: March 3, 2023 sinus rhythm with sinus arrhythmia, right bundle branch block, nonspecific ST-T changes, prolonged QTc.  Compared to the EKG from " 2022 sinus rhythm has replaced atrial fibrillation.  Echocardiogram:  Echocardiogram Complete  Order: [id number]   Status: Edited Result - FINAL      Visible to patient: No (inaccessible in MyChart)      Next appt: Today at 10:15 AM in Occupational Therapy (Lizet Dubon, JAYLA)      0 Result Notes  Details    Reading Physician Reading Date Result Priority   Roly Rosales MD  [phone] 10/21/2022      Narrative & Impression  [id number]  [id number]  [id number]  [id number]^ANNA^[place] [address]     Name: DANIELA BOONE  MRN: [id number]  : 1960  Study Date: 10/21/2022 09:10 AM  Age: 62 yrs  Gender: Female  Patient Location[facility]  Reason For Study: Pulmonary Emboli  Ordering Physician: JAMAAL CASTILLO  Performed By: WR     BSA: 1.8 m2  Height: 64 in  Weight: 155 lb  HR: 106  BP: 119/81 mmHg  ______________________________________________________________________________  Procedure  Complete Echo Adult.  ______________________________________________________________________________  Interpretation Summary     The left ventricle is borderline dilated.  Left ventricular function is decreased. The ejection fraction is 35-40%  (moderately reduced).  Mildly decreased right ventricular systolic function  The left atrium is severely dilated.  The right atrium is severely dilated.  The mitral leaflets appear thickened, hooded, and/or redundant, consistent  with myxomatous degeneration.  There is moderately severe (3+) mitral regurgitation.  There is moderate (2+) tricuspid regurgitation.  IVC diameter >2.1 cm collapsing <50% with sniff suggests a high RA pressure  estimated at 15 mmHg or greater.  There is mild (1+) aortic regurgitation.  Small pericardial effusion  ______________________________________________________________________________  Left Ventricle  The left ventricle is borderline dilated. Left ventricular function is  decreased. The ejection  fraction is 35-40% (moderately reduced). There is  borderline eccentric left ventricular hypertrophy. Diastolic function not  assessed due to atrial fibrillation. There is moderate global hypokinesia of  the left ventricle.     Right Ventricle  The right ventricle is normal size. Mildly decreased right ventricular  systolic function.     Atria  The left atrium is severely dilated. The right atrium is severely dilated.  There is no color Doppler evidence of an atrial shunt.     Mitral Valve  The mitral leaflets appear thickened, hooded, and/or redundant, consistent  with myxomatous degeneration. There is prolapse of the posterior mitral  leaflet. There is moderately severe (3+) mitral regurgitation.     Tricuspid Valve  Tricuspid valve leaflets appear normal. There is moderate (2+) tricuspid  regurgitation. The right ventricular systolic pressure is approximated at 19.1  mmHg plus the right atrial pressure. IVC diameter >2.1 cm collapsing <50% with  sniff suggests a high RA pressure estimated at 15 mmHg or greater.     Aortic Valve  The aortic valve is trileaflet. There is mild (1+) aortic regurgitation. No  hemodynamically significant valvular aortic stenosis.     Pulmonic Valve  The pulmonic valve is not well seen, but is grossly normal.     Vessels  Normal size ascending aorta.     Pericardium  Small pericardial effusion. There are no echocardiographic indications of  cardiac tamponade.       Imaging  Chest x-ray:     EXAM: CT CHEST PULMONARY EMBOLISM W CONTRAST  LOCATION: [facility]  DATE/TIME: 3/3/2023 8:44 PM     INDICATION: Hypoxia, history of pulmonary embolus  COMPARISON: 12/13/2022  TECHNIQUE: CT chest pulmonary angiogram during arterial phase injection of IV contrast. Multiplanar reformats and MIP reconstructions were performed. Dose reduction techniques were used.   CONTRAST: Isovue 370 75mL     FINDINGS:  ANGIOGRAM CHEST: Flow-related artifact in the pulmonary arteries,  particularly centrally. No pulmonary artery filling defects. The main pulmonary artery is dilated up to 4 cm. Normal caliber aorta.     LUNGS AND PLEURA: Mild interlobular septal thickening. Mild emphysema and bronchial wall thickening. Patchy areas of atelectasis and scarring. No effusions. Small volume airway secretions.     MEDIASTINUM/AXILLAE: Cardiomegaly. Small to medium-sized pericardial effusion. Reflux of contrast into the dilated IVC and hepatic veins. There are a few borderline to mildly enlarged mediastinal lymph nodes with the largest in the right hilum measuring   1.7 x 1.8 cm (series 5/101) and in the right upper paratracheal chain measuring 1.7 x 1.4 cm (series 5/40).     CORONARY ARTERY CALCIFICATION: Mild.     UPPER ABDOMEN: No significant finding.     MUSCULOSKELETAL: Small Schmorl's node and chronic central mild compression deformity of T12 is unchanged.                                                                      IMPRESSION:  1.  No pulmonary embolus.  2.  Cardiomegaly and pulmonary interstitial edema. Superimposed infection is in the differential.  3.  Small to medium-sized pericardial effusion.  4.  Mildly enlarged mediastinal lymph nodes may be reactive. Continued attention on follow-up.  5.  Mild emphysema.  6.  Findings suggestive of pulmonary hypertension   Lab Results    Chemistry/lipid CBC Cardiac Enzymes/BNP/TSH/INR   No results for input(s): CHOL, HDL, LDL, TRIG, CHOLHDLRATIO in the last 80705 hours.  No results for input(s): LDL in the last 97279 hours.  Recent Labs   Lab Test 03/03/23 1947      POTASSIUM 4.1   CHLORIDE 108*   CO2 17*      BUN 30*   CR 1.08   GFRESTIMATED 58*   ALAN 8.6     Recent Labs   Lab Test 03/03/23 1947 12/13/22  1905 10/23/22  0357   CR 1.08 0.9 1.04     No results for input(s): A1C in the last 84008 hours.       Recent Labs   Lab Test 03/03/23 1947   WBC 6.9   HGB 14.7   HCT 43.8   MCV 86        Recent Labs   Lab Test  03/03/23  1947 10/23/22  0357 10/21/22  0253   HGB 14.7 12.2 13.5    Recent Labs   Lab Test 03/03/23  1947 10/21/22  0253 10/20/22  1848   TROPONINI 0.07 0.03 0.03     Recent Labs   Lab Test 03/03/23  1947 10/20/22  1848 03/19/18  0709   BNP 2,506* 3,001* 2,790*     Recent Labs   Lab Test 03/03/23 1947   TSH 6.57*     Recent Labs   Lab Test 10/20/22  1848 03/12/18  1935 03/02/18  0538   INR 1.49* 3.85* 1.60*

## 2023-03-04 NOTE — ED PROVIDER NOTES
Emergency Department Encounter     Evaluation Date & Time:   3/3/2023  6:48 PM    CHIEF COMPLAINT:  Shortness of Breath      Triage Note:     FINAL IMPRESSION:    ICD-10-CM    1. Congestive heart failure, unspecified HF chronicity, unspecified heart failure type (H)  I50.9       2. Elevated TSH  R79.89       3. Acute respiratory failure with hypoxia (H)  J96.01           Impression and Plan     ED COURSE & MEDICAL DECISION MAKIN:00 PM I met the patient and performed my initial interview and exam.      ED Course as of 03/03/23 2230   Fri Mar 03, 2023   1920 Kallie was initially hypoxic.  Her lungs are clear, however, and with her history of PEs twice in the past we will do CT imaging.  She has been taking her Xarelto consistently without really missing doses until she started feeling so short of breath in the past week where she may be missed 1.  No swelling that she is noted to suggest right-sided CHF but certainly may still be left-sided CHF although as mentioned I do not hear it on her lung examination.  Otherwise may be a mass.  With the hypoxia I think the source will likely be respiratory certain other problems may be considered but again she is exchanging air well without signs of any sort of stiff chest wall to suggest something like IPF due to medications.  Will do imaging and labs to evaluate these things.    So, her CT did not show any evidence of new PE but does show evidence of CHF as well as pulmonary hypertension.  As she was hypoxic with tachypnea on presentation here will be admission for stabilization and we will start with Lasix.  She takes 40 mg orally so I did give her dose of 40 mg IV.  Her TSH is interestingly elevated for her history of Graves' disease that she is on methimazole for.  Troponin is not elevated.    Spoke with the hospitalist and will admit to the hospital    Kallie states she actually is no longer on the furosemide and was only on it while she was in the  hospital for her pulmonary embolisms.  She was discharged only on the methimazole and the blood thinner rivaroxaban.       At the conclusion of the encounter I discussed the results of all the tests and the disposition. The questions were answered. The patient or family acknowledged understanding and was agreeable with the care plan.          0 minutes of critical care time        MEDICATIONS GIVEN IN THE EMERGENCY DEPARTMENT:  Medications   furosemide (LASIX) injection 40 mg (has no administration in time range)   nitroGLYcerin (NITRO-BID) 2 % ointment 15 mg (has no administration in time range)   lidocaine 1 % 0.1-1 mL (has no administration in time range)   lidocaine (LMX4) cream (has no administration in time range)   sodium chloride (PF) 0.9% PF flush 3 mL (has no administration in time range)   sodium chloride (PF) 0.9% PF flush 3 mL (has no administration in time range)   melatonin tablet 3 mg (has no administration in time range)   Patient is already receiving anticoagulation with heparin, enoxaparin (LOVENOX), warfarin (COUMADIN)  or other anticoagulant medication (has no administration in time range)   docusate sodium (COLACE) capsule 100 mg (has no administration in time range)   ondansetron (ZOFRAN ODT) ODT tab 4 mg (has no administration in time range)     Or   ondansetron (ZOFRAN) injection 4 mg (has no administration in time range)   prochlorperazine (COMPAZINE) injection 10 mg (has no administration in time range)     Or   prochlorperazine (COMPAZINE) tablet 10 mg (has no administration in time range)     Or   prochlorperazine (COMPAZINE) suppository 25 mg (has no administration in time range)   furosemide (LASIX) injection 60 mg (has no administration in time range)   iopamidol (ISOVUE-370) solution 75 mL (75 mLs Intravenous $Given 3/3/23 2031)       NEW PRESCRIPTIONS STARTED AT TODAY'S ED VISIT:  New Prescriptions    No medications on file       HPI     HPI     Kallie Barker is a 63 year old  female with a pertinent history of paroxysmal atrial fibrillation, CHF, PE (, ), DVT, pleural effusion, Graves' disease, thyrotoxicosis, tobacco use, who presents to this ED by private car accompanied by  for evaluation of shortness of breath.    Patient presents with persistent shortness of breath for the past several weeks, worse with exertion. As a result, she has had difficulty moving around. At times, she has some bladder incontinence when she is particularly short of breath. No recent illness or medication changes. History of PE in  and . Currently on Xarelto. She missed 1-2 doses since she started feeling unwell in the past few weeks. History of atrial fibrillation and hyperthyroidism (on methimazole). Denies chest pain, cough, congestion, fever, leg swelling, urinary symptoms.     REVIEW OF SYSTEMS:  Review of Systems   Constitutional: Negative for fever.   HENT: Negative for congestion.    Respiratory: Positive for shortness of breath. Negative for cough.    Cardiovascular: Negative for chest pain and leg swelling.   Genitourinary: Negative for dysuria, frequency and hematuria.   Neurological:        Positive for bladder incontinence.      remainder of systems are all otherwise negative.        Medical History     No past medical history on file.    Past Surgical History:   Procedure Laterality Date     APPENDECTOMY       CARDIOVERSION  2018     PICC  3/13/2018            No family history on file.    Social History     Tobacco Use     Smoking status: Former     Packs/day: 1.00     Years: 30.00     Pack years: 30.00     Types: Cigarettes     Quit date: 2018     Years since quittin.0     Smokeless tobacco: Never   Substance Use Topics     Alcohol use: No     Drug use: No       furosemide (LASIX) 40 MG tablet  methimazole (TAPAZOLE) 5 MG tablet  multivitamin therapeutic tablet  potassium chloride ER (KLOR-CON M) 10 MEQ CR tablet  rivaroxaban ANTICOAGULANT (XARELTO) 15 MG  "TABS tablet  rivaroxaban ANTICOAGULANT (XARELTO) 20 MG TABS tablet        Physical Exam     First Vitals:  Patient Vitals for the past 24 hrs:   BP Temp Temp src Pulse Resp SpO2 Height Weight   03/03/23 2145 -- -- -- 84 18 100 % -- --   03/03/23 2130 (!) 183/73 -- -- 83 23 99 % -- --   03/03/23 2115 (!) 174/99 -- -- 86 21 99 % -- --   03/03/23 2100 (!) 163/95 -- -- 83 15 99 % -- --   03/03/23 2058 -- -- -- 85 29 100 % -- --   03/03/23 2030 (!) 145/89 -- -- 82 19 -- -- --   03/03/23 2011 -- -- -- 86 15 99 % -- --   03/03/23 1948 (!) 164/102 -- -- 82 22 98 % -- --   03/03/23 1930 (!) 161/96 -- -- 85 19 99 % -- --   03/03/23 1915 (!) 152/91 -- -- 83 25 99 % -- --   03/03/23 1857 (!) 145/98 97  F (36.1  C) Temporal 74 22 98 % 1.626 m (5' 4\") 70.3 kg (155 lb)   03/03/23 1849 -- 97  F (36.1  C) -- -- -- (!) 86 % -- --       PHYSICAL EXAM:   Constitutional:   Sitting up in bed. No acute distress.  HENT:  Normocephalic. Wearing mask. Normal voice.   Eyes:  PERRL, EOMI, Conjunctiva normal, No discharge, no scleral icterus.  Respiratory:  Breathing easily. Lungs clear to ascultation.   Cardiovascular:  Rate controlled. Irregular rhythm. No murmurs, rubs, or gallops.  Peripheral pulses dp, pt, and radial are wnl.  No peripheral edema   GI:  Bowel sounds normal, Soft, No tenderness, No flank tenderness, nondistended.  :No CVA tenderness.   Musculoskeletal:  Moves all extremities.  No erythematous or swollen major joints,   Integument:  Normal color.  Lymphatic:  No cervical lymphadenopathy  Neurologic:  Alert & oriented x 3, Normal motor function, Normal sensory function, No focal deficits noted. Normal speech.  Psychiatric:  Affect normal, Judgment normal, Mood normal.     Results     LAB AND RADIOLOGY:  All pertinent labs reviewed and interpreted  Results for orders placed or performed during the hospital encounter of 03/03/23   CT Chest Pulmonary Embolism w Contrast     Status: None    Narrative    EXAM: CT CHEST PULMONARY " EMBOLISM W CONTRAST  LOCATION: [facility]  DATE/TIME: 3/3/2023 8:44 PM    INDICATION: Hypoxia, history of pulmonary embolus  COMPARISON: 12/13/2022  TECHNIQUE: CT chest pulmonary angiogram during arterial phase injection of IV contrast. Multiplanar reformats and MIP reconstructions were performed. Dose reduction techniques were used.   CONTRAST: Isovue 370 75mL    FINDINGS:  ANGIOGRAM CHEST: Flow-related artifact in the pulmonary arteries, particularly centrally. No pulmonary artery filling defects. The main pulmonary artery is dilated up to 4 cm. Normal caliber aorta.    LUNGS AND PLEURA: Mild interlobular septal thickening. Mild emphysema and bronchial wall thickening. Patchy areas of atelectasis and scarring. No effusions. Small volume airway secretions.    MEDIASTINUM/AXILLAE: Cardiomegaly. Small to medium-sized pericardial effusion. Reflux of contrast into the dilated IVC and hepatic veins. There are a few borderline to mildly enlarged mediastinal lymph nodes with the largest in the right hilum measuring   1.7 x 1.8 cm (series 5/101) and in the right upper paratracheal chain measuring 1.7 x 1.4 cm (series 5/40).    CORONARY ARTERY CALCIFICATION: Mild.    UPPER ABDOMEN: No significant finding.    MUSCULOSKELETAL: Small Schmorl's node and chronic central mild compression deformity of T12 is unchanged.      Impression    IMPRESSION:  1.  No pulmonary embolus.  2.  Cardiomegaly and pulmonary interstitial edema. Superimposed infection is in the differential.  3.  Small to medium-sized pericardial effusion.  4.  Mildly enlarged mediastinal lymph nodes may be reactive. Continued attention on follow-up.  5.  Mild emphysema.  6.  Findings suggestive of pulmonary hypertension.   Basic metabolic panel     Status: Abnormal   Result Value Ref Range    Sodium 138 136 - 145 mmol/L    Potassium 4.1 3.5 - 5.0 mmol/L    Chloride 108 (H) 98 - 107 mmol/L    Carbon Dioxide (CO2) 17 (L) 22 - 31 mmol/L     Anion Gap 13 5 - 18 mmol/L    Urea Nitrogen 30 (H) 8 - 22 mg/dL    Creatinine 1.08 0.60 - 1.10 mg/dL    Calcium 8.6 8.5 - 10.5 mg/dL    Glucose 101 70 - 125 mg/dL    GFR Estimate 58 (L) >60 mL/min/1.73m2   Troponin I     Status: Normal   Result Value Ref Range    Troponin I 0.07 0.00 - 0.29 ng/mL   Magnesium     Status: Normal   Result Value Ref Range    Magnesium 1.8 1.8 - 2.6 mg/dL   TSH with free T4 reflex     Status: Abnormal   Result Value Ref Range    TSH 6.57 (H) 0.30 - 5.00 uIU/mL   B-Type Natriuretic Peptide ([facility] Only)     Status: Abnormal   Result Value Ref Range    BNP 2,506 (H) 0 - 96 pg/mL   CBC with platelets and differential     Status: Abnormal   Result Value Ref Range    WBC Count 6.9 4.0 - 11.0 10e3/uL    RBC Count 5.10 3.80 - 5.20 10e6/uL    Hemoglobin 14.7 11.7 - 15.7 g/dL    Hematocrit 43.8 35.0 - 47.0 %    MCV 86 78 - 100 fL    MCH 28.8 26.5 - 33.0 pg    MCHC 33.6 31.5 - 36.5 g/dL    RDW 15.4 (H) 10.0 - 15.0 %    Platelet Count 286 150 - 450 10e3/uL    % Neutrophils 75 %    % Lymphocytes 16 %    % Monocytes 8 %    % Eosinophils 0 %    % Basophils 1 %    % Immature Granulocytes 0 %    NRBCs per 100 WBC 0 <1 /100    Absolute Neutrophils 5.2 1.6 - 8.3 10e3/uL    Absolute Lymphocytes 1.1 0.8 - 5.3 10e3/uL    Absolute Monocytes 0.6 0.0 - 1.3 10e3/uL    Absolute Eosinophils 0.0 0.0 - 0.7 10e3/uL    Absolute Basophils 0.1 0.0 - 0.2 10e3/uL    Absolute Immature Granulocytes 0.0 <=0.4 10e3/uL    Absolute NRBCs 0.0 10e3/uL   CBC with platelets differential     Status: Abnormal    Narrative    The following orders were created for panel order CBC with platelets differential.  Procedure                               Abnormality         Status                     ---------                               -----------         ------                     CBC with platelets and d...[[id number]]  Abnormal            Final result                 Please view results for these tests on the individual orders.        PROCEDURES:  Procedures:      [facility] Documentation     Medical Decision Making    History:    Supplemental history from: Documented in chart, if applicable    External Record(s) reviewed: Documented in chart, if applicable.    Work Up:    Chart documentation includes differential considered and any EKGs or imaging independently interpreted by provider, where specified.    In additional to work up documented, I considered the following work up: Documented in chart, if applicable.    External consultation:    Discussion of management with another provider: Hospitalist    Complicating factors:    Care impacted by chronic illness: Other: heart failure    Care affected by social determinants of health: N/A    Disposition considerations: Admit.     EKG:    Performed at: 1857  Impression:  nsr with marked sinus arrhythmia, rbbb with repolarization, lae, pac.  Pr 208, qrs 146 ms, qtc 527ms, prt axes 59 67 10.  Compared to 10/20/22 nsr has replaced atrial fibrillation.    The above ekg was independently reviewed and interpreted by me.    The creation of this record is based on the [place] peoples's, observations of the work being performed by Dr. Hawa Pinto and the provider s statements to them. This document has been checked and approved by MD Hawa Burton MD  Emergency Medicine  [facility] EMERGENCY ROOM         Hawa Pinto MD  03/03/23 1892

## 2023-03-05 LAB
ALT SERPL W P-5'-P-CCNC: 127 U/L (ref 0–45)
ANION GAP SERPL CALCULATED.3IONS-SCNC: 13 MMOL/L (ref 5–18)
AST SERPL W P-5'-P-CCNC: 82 U/L (ref 0–40)
BUN SERPL-MCNC: 30 MG/DL (ref 8–22)
CALCIUM SERPL-MCNC: 8.9 MG/DL (ref 8.5–10.5)
CHLORIDE BLD-SCNC: 103 MMOL/L (ref 98–107)
CHOLEST SERPL-MCNC: 193 MG/DL
CO2 SERPL-SCNC: 24 MMOL/L (ref 22–31)
CREAT SERPL-MCNC: 0.97 MG/DL (ref 0.6–1.1)
GFR SERPL CREATININE-BSD FRML MDRD: 66 ML/MIN/1.73M2
GLUCOSE BLD-MCNC: 87 MG/DL (ref 70–125)
HDLC SERPL-MCNC: 35 MG/DL
HOLD SPECIMEN: NORMAL
LDLC SERPL CALC-MCNC: 141 MG/DL
MAGNESIUM SERPL-MCNC: 1.8 MG/DL (ref 1.8–2.6)
POTASSIUM BLD-SCNC: 3.6 MMOL/L (ref 3.5–5)
SODIUM SERPL-SCNC: 140 MMOL/L (ref 136–145)
TRIGL SERPL-MCNC: 86 MG/DL

## 2023-03-05 PROCEDURE — 83735 ASSAY OF MAGNESIUM: CPT | Performed by: INTERNAL MEDICINE

## 2023-03-05 PROCEDURE — 120N000001 HC R&B MED SURG/OB

## 2023-03-05 PROCEDURE — 99232 SBSQ HOSP IP/OBS MODERATE 35: CPT | Performed by: INTERNAL MEDICINE

## 2023-03-05 PROCEDURE — 93005 ELECTROCARDIOGRAM TRACING: CPT | Performed by: INTERNAL MEDICINE

## 2023-03-05 PROCEDURE — 80061 LIPID PANEL: CPT | Performed by: INTERNAL MEDICINE

## 2023-03-05 PROCEDURE — 80048 BASIC METABOLIC PNL TOTAL CA: CPT | Performed by: INTERNAL MEDICINE

## 2023-03-05 PROCEDURE — 84450 TRANSFERASE (AST) (SGOT): CPT | Performed by: INTERNAL MEDICINE

## 2023-03-05 PROCEDURE — 93010 ELECTROCARDIOGRAM REPORT: CPT | Performed by: INTERNAL MEDICINE

## 2023-03-05 PROCEDURE — 999N000157 HC STATISTIC RCP TIME EA 10 MIN

## 2023-03-05 PROCEDURE — 250N000013 HC RX MED GY IP 250 OP 250 PS 637: Performed by: INTERNAL MEDICINE

## 2023-03-05 PROCEDURE — 84460 ALANINE AMINO (ALT) (SGPT): CPT | Performed by: INTERNAL MEDICINE

## 2023-03-05 PROCEDURE — 93005 ELECTROCARDIOGRAM TRACING: CPT

## 2023-03-05 PROCEDURE — 250N000011 HC RX IP 250 OP 636: Performed by: INTERNAL MEDICINE

## 2023-03-05 PROCEDURE — 36415 COLL VENOUS BLD VENIPUNCTURE: CPT | Performed by: INTERNAL MEDICINE

## 2023-03-05 RX ORDER — FUROSEMIDE 40 MG
40 TABLET ORAL DAILY
Status: DISCONTINUED | OUTPATIENT
Start: 2023-03-06 | End: 2023-03-06 | Stop reason: HOSPADM

## 2023-03-05 RX ADMIN — METOPROLOL SUCCINATE 25 MG: 25 TABLET, EXTENDED RELEASE ORAL at 08:09

## 2023-03-05 RX ADMIN — RIVAROXABAN 20 MG: 10 TABLET, FILM COATED ORAL at 11:02

## 2023-03-05 RX ADMIN — FUROSEMIDE 40 MG: 10 INJECTION, SOLUTION INTRAVENOUS at 06:27

## 2023-03-05 RX ADMIN — LOSARTAN POTASSIUM 12.5 MG: 25 TABLET, FILM COATED ORAL at 08:10

## 2023-03-05 ASSESSMENT — ACTIVITIES OF DAILY LIVING (ADL)
ADLS_ACUITY_SCORE: 18

## 2023-03-05 NOTE — PLAN OF CARE
"  Problem: Plan of Care - These are the overarching goals to be used throughout the patient stay.    Goal: Optimal Comfort and Wellbeing  Outcome: Progressing   Goal Outcome Evaluation:  /66 (BP Location: Left arm, Patient Position: Semi-May's)   Pulse 56   Temp 97.4  F (36.3  C) (Oral)   Resp 18   Ht 1.626 m (5' 4\")   Wt 67.5 kg (148 lb 14.4 oz)   SpO2 94%   BMI 25.56 kg/m    Patient is alert and oiented X4. Denied pain. Telemetry at 0400 read sinus cheryl with bundle branch block, 1st degree AV block. Oxygen at 2L via NC to maintain SATs above 92%. Ambulated to the bathroom and voided spontaneously. IV saline locked. Discharge plan pending Labs and medical clearance.                         "

## 2023-03-05 NOTE — PLAN OF CARE
Problem: Heart Failure Comorbidity  Goal: Maintenance of Heart Failure Symptom Control  Outcome: Progressing  Intervention: Maintain Heart Failure Management  Recent Flowsheet Documentation  Taken 3/5/2023 0818 by Karin Aguirre RN  Medication Review/Management: medications reviewed     Problem: Gas Exchange Impaired  Goal: Optimal Gas Exchange  Outcome: Progressing       Patient is A/Ox4, VSS on RA. SBA when ambulating. Voiding adequately. Denies SOB, full sensation per Pt. IV saline locked, patent. No new skin issues noted. Patient denying pain during shift. Patient's O2 ranged from 86%-91% when walking. Dr. Barraza notified and is active in patient's care. Telemetry showing sinus arrhythmia with a 1st degree AVB, BBB, and T-wave inversion. Patient is on the magnesium and potassium protocol, redraw in the morning. Strict RON monitored.

## 2023-03-05 NOTE — CONSULTS
Care Management Initial Consult    General Information  Assessment completed with:  ,    Type of CM/SW Visit: Chart Assessment    Communication Assessment  Patient's communication style: spoken language (English or Bilingual)    Hearing Difficulty or Deaf: no   Wear Glasses or Blind: no    Cognitive  Cognitive/Neuro/Behavioral: WDL                        Family/Social Support:  Care provided by: self, spouse/significant other    Equipment currently used at home: grab bar, toilet, grab bar, tub/shower       Lifestyle & Psychosocial Needs:  Social Determinants of Health     Tobacco Use: Not on file   Alcohol Use: Not on file   Financial Resource Strain: Not on file   Food Insecurity: Not on file   Transportation Needs: Not on file   Physical Activity: Not on file   Stress: Not on file   Social Connections: Not on file   Intimate Partner Violence: Not on file   Depression: Not on file   Housing Stability: Not on file          Additional Information:  8:28 AM  Chart reviewed.  Anticipate discharge home, no needs.  Anticipate family transport.      MAITE Patel

## 2023-03-05 NOTE — PROGRESS NOTES
Cardiology Progress Note    Assessment:  1.  Congestive heart failure with reduced systolic function.  Patient had previously been seen by Dr. Gonzales in 2018 with an ejection fraction as low as 20% at that time improving to 52% on an echocardiogram March 2018 with treatment of thyrotoxicosis and atrial fibrillation..  Patient underwent cardioversion from atrial fibrillation April 2018.  She was seen in the atrial fibrillation clinic July 2018 and was to follow-up in 6 months but was lost to follow-up.  Most recently she was admitted to hospital October 2022 with acute pulmonary embolism.  Echocardiogram from that admission reported an ejection fraction of 35 to 40%.  Myxomatous changes of the mitral valve with moderately severe mitral regurgitation.  Patient was not seen by cardiology during that admission or in subsequent follow-up.  She is admitted with worsening symptoms of shortness of breath and findings of congestive heart failure.  BNP of 2506 on admission.  Echocardiogram completed yesterday shows an ejection fraction of 30 to 35%.  Mild decrease in right ventricular function.  Severe biatrial enlargement.  Myxomatous changes of the mitral valve with moderately severe mitral regurgitation.  Small pericardial effusion was also identified.  Net urine output of 2.4 L.  Currently on furosemide 40 mg IV every 12 hours.  Weight today 148 pounds compared to 155 pounds March 3 but different scales.  Lab results are pending.  Patient started on losartan and metoprolol on admission.  Lab work today shows potassium of 3.6, BUN of 30, creatinine 0.97.  Creatinine improved.    2.  Paroxysmal atrial fibrillation.  History of atrial fibrillation associated with thyrotoxicosis and Graves' disease 2018.  Patient did have atrial fibrillation during the recent hospitalization October 2022 for acute pulmonary embolism.  Patient has been in sinus rhythm this admission.  Patient has been on Xarelto for both atrial fibrillation  and pulmonary embolism.  She does endorse missing a few doses recently.    3.  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  Likely will benefit from further evaluation and consideration of transesophageal echocardiogram to further define.    4.  Risk factors for coronary artery disease.  Would recommend ischemic evaluation for LV dysfunction.  Patient has a history of long-term tobacco use.  CT this admission suggested mild coronary calcification by report.  Active Problems:    Elevated TSH    Acute respiratory failure with hypoxia (H)    Congestive heart failure, unspecified HF chronicity, unspecified heart failure type (H)      Plan:  1.  Await laboratory studies pending from today.  Now reviewed.  2.  Patient initiated on losartan 12.5 mg daily and metoprolol XL 25 mg daily.  Up titration as tolerated.  She is noting some mild lightheadedness today.  We will consider transitioning to Entresto if cost is acceptable.  We will ask pharmacy to further address.  3.  Smoking cessation  4.  Check lipids.  These are not identified in the chart record.  5.  Low TSH per primary care  6.  Suggest CT angiogram to exclude occult coronary artery disease in the setting of LV dysfunction.  She does have risk factors.  We can arrange for tomorrow.  Discussed with patient she is in agreement.  7.  Eventual GLENYS to further assess the mitral valve and myxomatous valve.  8.  We will transition to oral furosemide.  Patient received 40 mg IV this morning will change to 40 mg daily starting tomorrow.  9.  I discontinued Compazine and Zofran secondary to increased QT.  Patient has not had additional nausea.  Will defer to hospitalist regarding ongoing treatment.    ecg from earlier reviewed    Subjective:   Kallie Barker is seen in follow-up.  Chart record reviewed.  She reports feeling at least 50% better.  She was able to lie flat in bed.  She notes feeling mild lightheadedness upon standing up.  We talked  "about the medicines and the current echocardiographic findings.    Objective:   Vital signs in last 24 hours:  VITALS: /75 (BP Location: Left arm)   Pulse 61   Temp 97.2  F (36.2  C) (Oral)   Resp 16   Ht 1.626 m (5' 4\")   Wt 67.5 kg (148 lb 14.4 oz)   SpO2 94%   BMI 25.56 kg/m    BMI: Body mass index is 25.56 kg/m .  Wt Readings from Last 3 Encounters:   23 67.5 kg (148 lb 14.4 oz)   10/23/22 74 kg (163 lb 3.2 oz)   10/01/18 58.1 kg (128 lb)       Intake/Output Summary (Last 24 hours) at 3/5/2023 0814  Last data filed at 3/5/2023 0723  Gross per 24 hour   Intake 820 ml   Output 2700 ml   Net -1880 ml       Physical Exam:  General: Resting comfortably in bed in no acute distress   Neck: Jugular venous pressure appears within normal limits on current exam.   Lungs: clear to auscultation   COR: RRR, S4, 2/6 systolic murmur   Abd: nondistended, BS present, nontender   Extrem: No edema, warm  Neuro: No focal deficits.    Cardiographics:    ECG: ECG: March 3, 2023 sinus rhythm with sinus arrhythmia, right bundle branch block, nonspecific ST-T changes, prolonged QTc.  Compared to the EKG from 2022 sinus rhythm has replaced atrial fibrillation.  Echocardiogram:   Echocardiogram Complete  Order: [id number]   Status: Edited Result - FINAL      Visible to patient: No (inaccessible in MyChart)      Next appt: None      0 Result Notes  Details    Reading Physician Reading Date Result Priority   Tyrell Correa MD  [phone] 3/4/2023      Narrative & Impression  [id number]  [id number]  [id number]  [id number]^BRYANT^AROLDO^[place] [address]     Name: DANIELA BOONE  MRN: [id number]  : 1960  Study Date: 2023 08:14 AM  Age: 63 yrs  Gender: Female  Patient Location: EME  Reason For Study: Heart Failure  Ordering Physician: AROLDO HURTADO  Performed By: MB     BSA: 1.8 m2  Height: 64 in  Weight: 155 lb  HR: 65  BP: 116/60 " mmHg  ______________________________________________________________________________  Procedure  Complete Echo Adult.  ______________________________________________________________________________  Interpretation Summary     Normal left-ventricular size. Left ventricular systolic function is moderately  globally reduced. Left ventricular ejection fraction estimated at 30 to  35%.Diastolic Doppler findings (E/E' ratio and/or other parameters) suggest  left ventricular filling pressures are increased.  Mild to moderate right ventricular enlargement. Mild decrease in RV systolic  function suggested.  Severe left atrial enlargement. Moderate to severe right atrial enlargement.  Myxomatous changes of the mitral valve. Eccentric mitral regurgitation that  appears moderately severe.(2-3+)  Moderate tricuspid regurgitation. Estimate of RV systolic pressure is normal  at 24 mmHg possible atrial pressure.  Mild aortic insufficiency  IVC diameter >2.1 cm collapsing <50% with sniff suggests a high RA pressure  estimated at 15 mmHg or greater.  Small pericardial effusion. Measures approximate 1.1 cm posteriorly. No  suggestion of tamponade physiology by echocardiography.  Compared to the study October 2022. Heart rate is slower and now in sinus  rhythm. Estimate of LV systolic function is similar.Smalll pericardial  effsuion appears unchanged  ______________________________________________________________________________  Left Ventricle  The left ventricle is normal in size. There is normal left ventricular wall  thickness. The visual ejection fraction is 30-35%. Diastolic Doppler findings  (E/E' ratio and/or other parameters) suggest left ventricular filling  pressures are increased. There is moderate global hypokinesia of the left  ventricle.     Right Ventricle  The right ventricle is mild to moderately dilated. Moderately decreased right  ventricular systolic function. TAPSE is abnormal, which is consistent with  abnormal  right ventricular systolic function.     Atria  The left atrium is severely dilated. The right atrium is moderate to severely  dilated.     Mitral Valve  The mitral valve leaflets appear thickened, but open well. The mitral leaflets  appear thickened, hooded, and/or redundant, consistent with myxomatous  degeneration. The mitral regurgitant jet is eccentrically directed. There is  moderate to mod-severe (2-3+) mitral regurgitation.     Tricuspid Valve  The tricuspid valve is not well visualized, but is grossly normal. The right  ventricular systolic pressure is approximated at 24.2 mmHg plus the right  atrial pressure. There is moderate (2+) tricuspid regurgitation.     Aortic Valve  The aortic valve is trileaflet with aortic valve sclerosis. There is mild (1+)  aortic regurgitation. No hemodynamically significant valvular aortic stenosis.     Pulmonic Valve  The pulmonic valve is not well seen, but is grossly normal. There is trace  pulmonic valvular regurgitation.     Vessels  The aorta root is normal. Normal size ascending aorta. IVC diameter >2.1 cm  collapsing <50% with sniff suggests a high RA pressure estimated at 15 mmHg or  greater.     Pericardium  Small pericardial effusion. The pericardial effusion measures approximately  1.1 cm posteriorly.       Telemetry: Sinus rhythm, wider complex tachycardia last evening.    Imaging:   Study Result    Narrative & Impression   EXAM: CT CHEST PULMONARY EMBOLISM W CONTRAST  LOCATION: [facility]  DATE/TIME: 3/3/2023 8:44 PM     INDICATION: Hypoxia, history of pulmonary embolus  COMPARISON: 12/13/2022  TECHNIQUE: CT chest pulmonary angiogram during arterial phase injection of IV contrast. Multiplanar reformats and MIP reconstructions were performed. Dose reduction techniques were used.   CONTRAST: Isovue 370 75mL     FINDINGS:  ANGIOGRAM CHEST: Flow-related artifact in the pulmonary arteries, particularly centrally. No pulmonary artery filling  defects. The main pulmonary artery is dilated up to 4 cm. Normal caliber aorta.     LUNGS AND PLEURA: Mild interlobular septal thickening. Mild emphysema and bronchial wall thickening. Patchy areas of atelectasis and scarring. No effusions. Small volume airway secretions.     MEDIASTINUM/AXILLAE: Cardiomegaly. Small to medium-sized pericardial effusion. Reflux of contrast into the dilated IVC and hepatic veins. There are a few borderline to mildly enlarged mediastinal lymph nodes with the largest in the right hilum measuring   1.7 x 1.8 cm (series 5/101) and in the right upper paratracheal chain measuring 1.7 x 1.4 cm (series 5/40).     CORONARY ARTERY CALCIFICATION: Mild.     UPPER ABDOMEN: No significant finding.     MUSCULOSKELETAL: Small Schmorl's node and chronic central mild compression deformity of T12 is unchanged.                                                                      IMPRESSION:  1.  No pulmonary embolus.  2.  Cardiomegaly and pulmonary interstitial edema. Superimposed infection is in the differential.  3.  Small to medium-sized pericardial effusion.  4.  Mildly enlarged mediastinal lymph nodes may be reactive. Continued attention on follow-up.  5.  Mild emphysema.  6.  Findings suggestive of pulmonary hypertension.          Lab Results    Chemistry/lipid CBC Cardiac Enzymes/BNP/TSH/INR   No results for input(s): CHOL, HDL, LDL, TRIG, CHOLHDLRATIO in the last 60685 hours.  No results for input(s): LDL in the last 76774 hours.  Recent Labs   Lab Test 03/04/23  1710 03/04/23  1154   NA  --  137   POTASSIUM 3.5 3.2*   CHLORIDE  --  103   CO2  --  21*   GLC  --  117   BUN  --  35*   CR  --  1.11*   GFRESTIMATED  --  56*   ALAN  --  8.6     Recent Labs   Lab Test 03/04/23  1154 03/03/23  1947 12/13/22  1905   CR 1.11* 1.08 0.9     No results for input(s): A1C in the last 89598 hours.       Recent Labs   Lab Test 03/03/23 1947   WBC 6.9   HGB 14.7   HCT 43.8   MCV 86        Recent Labs   Lab  Test 03/03/23  1947 10/23/22  0357 10/21/22  0253   HGB 14.7 12.2 13.5    Recent Labs   Lab Test 03/03/23  1947 10/21/22  0253 10/20/22  1848   TROPONINI 0.07 0.03 0.03     Recent Labs   Lab Test 03/03/23  1947 10/20/22  1848 03/19/18  0709   BNP 2,506* 3,001* 2,790*     Recent Labs   Lab Test 03/03/23 1947   TSH 6.57*     Recent Labs   Lab Test 10/20/22  1848 03/12/18  1935 03/02/18  0538   INR 1.49* 3.85* 1.60*

## 2023-03-05 NOTE — PROGRESS NOTES
[facility]    Medicine Progress Note - Hospitalist Service    Date of Admission:  3/3/2023    Assessment & Plan   Kallie Barker 63-year-old female with history of paroxysmal A-fib, heart failure with reduced EF, pulmonary embolism in 2022, Graves' disease, thyrotoxicosis, active tobacco abuse came into ER with a complaint of progressive worsening of shortness of breath for several weeks.  On arrival to ER patient was noted to be hypoxic requiring oxygen supplementation.  Work-up revealed acute on chronic heart failure with reduced EF.  CT chest cardiomegaly and pulmonary interstitial edema, small to medium size pericardial effusion, mildly enlarged mediastinal lymph node, mild emphysema    Assessment and plan  #Acute hypoxic respiratory failure resolved  #Acute on chronic heart failure with reduced EF  #Pericardial effusion on CT chest.  #Paroxysmal A-fib currently in sinus rhythm  Echo 3/4 EF 30 to 35% severe left atrial enlargement.  Moderate TR small pericardial effusion without tamponade physiology by echocardiogram.  Appreciate cardiology input.  Cardiology recommended CT angiogram, possible GLENYS for maximal  Transition Lasix to oral, continue losartan and Toprol-XL.  Pharmacy consulted for Entresto cost  Continue anticoagulation with Xarelto  Strict RON's, daily weight.  Keep potassium above 4, magnesium above 2.    #History of pulmonary embolism in 2022  Anticoagulation with Xarelto.    #Graves' disease, thyrotoxicosis.  She was last seen by endocrine in 2018 recommendations were to continue Tapazole 10 mg once a day for at least 1 year if she is rebound into hypothyroid and then radioactive iodine treatment was recommended.  TSH 6.5  Will hold methimazole at time of discharge  She will need a referral for endocrine at time of discharge.    #Active tobacco abuse  Counseled regarding cessation.  Nicotine patch.  Tobacco cessation may be difficult as her  also smokes inside  "the house.       Diet: 2 Gram Sodium Diet Other - please comment    DVT Prophylaxis: DOAC  Marlow Catheter: Not present  Lines: None     Cardiac Monitoring: ACTIVE order. Indication: Acute decompensated heart failure (48 hours)  Code Status: Full CodeFull Code    Clinically Significant Risk Factors        # Hypokalemia: Lowest K = 3.2 mmol/L in last 2 days, will replace as needed     # Hypomagnesemia: Lowest Mg = 1.6 mg/dL in last 2 days, will replace as needed              # Overweight: Estimated body mass index is 25.56 kg/m  as calculated from the following:    Height as of this encounter: 1.626 m (5' 4\").    Weight as of this encounter: 67.5 kg (148 lb 14.4 oz)., PRESENT ON ADMISSION         Disposition Plan  Continue diuresis.     Expected Discharge Date: 03/05/2023                  Marifer Barraza MD  Hospitalist Service  [facility]  Securely message with [employer] (more info)  Text page via [employer] Paging/Directory      Disclaimer: This note consists of symbols derived from keyboarding, dictation and/or voice recognition software. As a result, there may be errors in the script that have gone undetected. Please consider this when interpreting information found in this chart.    ______________________________________________________________________    Interval History   She is reporting feeling fatigue this morning denies any nausea, vomiting, shortness of breath.  No acute event reported overnight    Physical Exam   Vital Signs: Temp: 97.2  F (36.2  C) Temp src: Oral BP: 111/57 Pulse: 58   Resp: 16 SpO2: 96 % O2 Device: None (Room air) Oxygen Delivery: 2 LPM  Weight: 148 lbs 14.4 oz    Constitutional: awake, alert, cooperative, no apparent distress, and appears stated age  Eyes: extra-ocular muscles intact  Respiratory: Bilateral clear to auscultate, no wheeze no rhonchi.  Next cardiovascular: regular rate and rhythm, normal S1 and S2,  and no edema  GI: No scars, normal bowel sounds, soft, " non-distended, non-tender, no masses palpated, no hepatosplenomegally  Skin: no bruising or bleeding  Musculoskeletal: no lower extremity pitting edema present  Neurologic: Alert oriented x3, no focal neurodeficit noted    Medical Decision Making       45 MINUTES SPENT BY ME on the date of service doing chart review, history, exam, documentation & further activities per the note.      Data     I have personally reviewed the following data over the past 24 hrs:    N/A  \   N/A   / N/A     140 103 30 (H) /  87   3.6 24 0.97 \       ALT: 127 (H) AST: 82 (H) AP: N/A TBILI: N/A   ALB: N/A TOT PROTEIN: N/A LIPASE: N/A       Imaging results reviewed over the past 24 hrs:   No results found for this or any previous visit (from the past 24 hour(s)).

## 2023-03-05 NOTE — PLAN OF CARE
Problem: Gas Exchange Impaired  Goal: Optimal Gas Exchange  Outcome: Progressing  Pt denies SOB; on RA with sats low-mid 90s when awake. On 1.5 LPM O2 via NC while sleeping to keep sats > 92%.    Problem: Electrolyte Imbalance  Goal: Electrolyte Imbalance: Plan of Care  Outcome: Progressing  Pt is on Mag and K protocols, both labs recheck in AM. Pt receiving IV lasix BID for diuresis and continues I/Os, 2 gm Na diet. Tele is NSR.    Possible discharge home 3/5/23, pending medical clearance/labs.

## 2023-03-06 ENCOUNTER — TELEPHONE (OUTPATIENT)
Dept: CARDIOLOGY | Facility: CLINIC | Age: 63
End: 2023-03-06
Payer: COMMERCIAL

## 2023-03-06 ENCOUNTER — APPOINTMENT (OUTPATIENT)
Dept: CT IMAGING | Facility: CLINIC | Age: 63
End: 2023-03-06
Attending: INTERNAL MEDICINE
Payer: COMMERCIAL

## 2023-03-06 VITALS
OXYGEN SATURATION: 97 % | TEMPERATURE: 97.9 F | DIASTOLIC BLOOD PRESSURE: 61 MMHG | WEIGHT: 148.9 LBS | BODY MASS INDEX: 25.42 KG/M2 | RESPIRATION RATE: 16 BRPM | SYSTOLIC BLOOD PRESSURE: 112 MMHG | HEIGHT: 64 IN | HEART RATE: 60 BPM

## 2023-03-06 DIAGNOSIS — I50.9 ACUTE DECOMPENSATED HEART FAILURE (H): Primary | ICD-10-CM

## 2023-03-06 LAB
ANION GAP SERPL CALCULATED.3IONS-SCNC: 15 MMOL/L (ref 5–18)
ATRIAL RATE - MUSE: 54 BPM
BSA FOR ECHO PROCEDURE: 1.72 M2
BUN SERPL-MCNC: 29 MG/DL (ref 8–22)
CALCIUM SERPL-MCNC: 8.8 MG/DL (ref 8.5–10.5)
CCTA ASCENDING AORTA: 3.6
CCTA SINUS: 3.7
CHLORIDE BLD-SCNC: 106 MMOL/L (ref 98–107)
CO2 SERPL-SCNC: 18 MMOL/L (ref 22–31)
CREAT SERPL-MCNC: 0.9 MG/DL (ref 0.6–1.1)
DIASTOLIC BLOOD PRESSURE - MUSE: NORMAL MMHG
GFR SERPL CREATININE-BSD FRML MDRD: 72 ML/MIN/1.73M2
GLUCOSE BLD-MCNC: 115 MG/DL (ref 70–125)
INTERPRETATION ECG - MUSE: NORMAL
MAGNESIUM SERPL-MCNC: 1.7 MG/DL (ref 1.8–2.6)
P AXIS - MUSE: 41 DEGREES
POTASSIUM BLD-SCNC: 3.5 MMOL/L (ref 3.5–5)
POTASSIUM BLD-SCNC: 3.5 MMOL/L (ref 3.5–5)
PR INTERVAL - MUSE: 224 MS
QRS DURATION - MUSE: 148 MS
QT - MUSE: 532 MS
QTC - MUSE: 504 MS
R AXIS - MUSE: 29 DEGREES
SODIUM SERPL-SCNC: 139 MMOL/L (ref 136–145)
SYSTOLIC BLOOD PRESSURE - MUSE: NORMAL MMHG
T AXIS - MUSE: -60 DEGREES
VENTRICULAR RATE- MUSE: 54 BPM

## 2023-03-06 PROCEDURE — 250N000011 HC RX IP 250 OP 636: Performed by: INTERNAL MEDICINE

## 2023-03-06 PROCEDURE — 250N000013 HC RX MED GY IP 250 OP 250 PS 637: Performed by: INTERNAL MEDICINE

## 2023-03-06 PROCEDURE — 36415 COLL VENOUS BLD VENIPUNCTURE: CPT | Performed by: HOSPITALIST

## 2023-03-06 PROCEDURE — 75574 CT ANGIO HRT W/3D IMAGE: CPT | Mod: 26 | Performed by: GENERAL ACUTE CARE HOSPITAL

## 2023-03-06 PROCEDURE — 99239 HOSP IP/OBS DSCHRG MGMT >30: CPT | Performed by: INTERNAL MEDICINE

## 2023-03-06 PROCEDURE — 75574 CT ANGIO HRT W/3D IMAGE: CPT

## 2023-03-06 PROCEDURE — 83735 ASSAY OF MAGNESIUM: CPT | Performed by: HOSPITALIST

## 2023-03-06 PROCEDURE — 80048 BASIC METABOLIC PNL TOTAL CA: CPT | Performed by: INTERNAL MEDICINE

## 2023-03-06 PROCEDURE — 84132 ASSAY OF SERUM POTASSIUM: CPT | Performed by: HOSPITALIST

## 2023-03-06 RX ORDER — LIDOCAINE 40 MG/G
CREAM TOPICAL
Status: DISCONTINUED | OUTPATIENT
Start: 2023-03-06 | End: 2023-03-06

## 2023-03-06 RX ORDER — DILTIAZEM HYDROCHLORIDE 5 MG/ML
5 INJECTION INTRAVENOUS
Status: DISCONTINUED | OUTPATIENT
Start: 2023-03-06 | End: 2023-03-06 | Stop reason: HOSPADM

## 2023-03-06 RX ORDER — LOSARTAN POTASSIUM 25 MG/1
12.5 TABLET ORAL DAILY
Qty: 30 TABLET | Refills: 0 | Status: SHIPPED | OUTPATIENT
Start: 2023-03-07 | End: 2023-05-11

## 2023-03-06 RX ORDER — NITROGLYCERIN 0.4 MG/1
0.4 TABLET SUBLINGUAL ONCE
Status: COMPLETED | OUTPATIENT
Start: 2023-03-06 | End: 2023-03-06

## 2023-03-06 RX ORDER — FUROSEMIDE 40 MG
40 TABLET ORAL DAILY
Qty: 30 TABLET | Refills: 0 | Status: SHIPPED | OUTPATIENT
Start: 2023-03-07 | End: 2023-03-16

## 2023-03-06 RX ORDER — METOPROLOL TARTRATE 1 MG/ML
5 INJECTION, SOLUTION INTRAVENOUS
Status: DISCONTINUED | OUTPATIENT
Start: 2023-03-06 | End: 2023-03-06 | Stop reason: HOSPADM

## 2023-03-06 RX ORDER — DILTIAZEM HYDROCHLORIDE 5 MG/ML
10 INJECTION INTRAVENOUS
Status: DISCONTINUED | OUTPATIENT
Start: 2023-03-06 | End: 2023-03-06 | Stop reason: HOSPADM

## 2023-03-06 RX ORDER — IOPAMIDOL 755 MG/ML
100 INJECTION, SOLUTION INTRAVASCULAR ONCE
Status: COMPLETED | OUTPATIENT
Start: 2023-03-06 | End: 2023-03-06

## 2023-03-06 RX ORDER — METOPROLOL SUCCINATE 25 MG/1
25 TABLET, EXTENDED RELEASE ORAL DAILY
Qty: 30 TABLET | Refills: 0 | Status: SHIPPED | OUTPATIENT
Start: 2023-03-07 | End: 2023-03-15

## 2023-03-06 RX ORDER — NICOTINE 21 MG/24HR
1 PATCH, TRANSDERMAL 24 HOURS TRANSDERMAL DAILY
Qty: 30 PATCH | Refills: 0 | Status: SHIPPED | OUTPATIENT
Start: 2023-03-07

## 2023-03-06 RX ADMIN — LOSARTAN POTASSIUM 12.5 MG: 25 TABLET, FILM COATED ORAL at 09:50

## 2023-03-06 RX ADMIN — IOPAMIDOL 100 ML: 755 INJECTION, SOLUTION INTRAVENOUS at 13:58

## 2023-03-06 RX ADMIN — METOPROLOL SUCCINATE 25 MG: 25 TABLET, EXTENDED RELEASE ORAL at 09:49

## 2023-03-06 RX ADMIN — NITROGLYCERIN 0.4 MG: 0.4 TABLET SUBLINGUAL at 13:54

## 2023-03-06 RX ADMIN — RIVAROXABAN 20 MG: 10 TABLET, FILM COATED ORAL at 09:49

## 2023-03-06 RX ADMIN — FUROSEMIDE 40 MG: 40 TABLET ORAL at 09:49

## 2023-03-06 ASSESSMENT — ACTIVITIES OF DAILY LIVING (ADL)
ADLS_ACUITY_SCORE: 18
ADLS_ACUITY_SCORE: 19
ADLS_ACUITY_SCORE: 18
ADLS_ACUITY_SCORE: 19

## 2023-03-06 NOTE — PROGRESS NOTES
Patient denies pain. O2 Sats 92-95% on room air. Ambulated around unit. Procedure complete. Patient's ride is here and she wants to leave. [facility] notified. Cardiology paged per [facility] request. [facility] spoke with patient via phone regarding discharge.

## 2023-03-06 NOTE — PLAN OF CARE
Problem: Gas Exchange Impaired  Goal: Optimal Gas Exchange  Intervention: Optimize Oxygenation and Ventilation  Recent Flowsheet Documentation  Taken 3/6/2023 0033 by Atiya Ramires RN  Head of Bed (HOB) Positioning: HOB at 30-45 degrees   Goal Outcome Evaluation:  Patient is alert and oriented X 4. Telemetry reading Sinus Bradycardia with 1st degree AV block. Denied pain. Was on oxygen 2L via NC. Ambulated to the bathroom with SBA. IV saline locked. Discharge plan pending CT angiogram. Call light within reach.

## 2023-03-06 NOTE — PLAN OF CARE
Problem: Heart Failure Comorbidity  Goal: Maintenance of Heart Failure Symptom Control  Intervention: Maintain Heart Failure Management  Recent Flowsheet Documentation  Taken 3/5/2023 1530 by Maliha Mark RN  Medication Review/Management: medications reviewed     Problem: Gas Exchange Impaired  Goal: Optimal Gas Exchange  Outcome: Progressing     Pt is on RA, sats stable > 92% at rest; on continuous pulse oximeter. Reports some dyspnea with exertion and desats to mid-high 80s with ambulation but O2 sats recover quickly with rest. Placed on O2 @ 2 LPM via NC while sleeping for sats to remain >/= 92%.    Plan is angiogram 3/6/23. Pt continues Mag and K protocols which are recheck labs in AM. Continues 2 gm Na+ diet and I/Os monitoring. Tele reads sinus dysrhythmia with bradycardic rate in high 50s bpm with 1st degree AV block.

## 2023-03-06 NOTE — DISCHARGE SUMMARY
[facility]  Hospitalist Discharge Summary      Date of Admission:  3/3/2023  Date of Discharge:  3/6/2023  4:56 PM  Discharging Provider: Daily Mosquera MD  Discharge Service: Hospitalist Service    Discharge Diagnoses   Afib   CHD   PE   Thyrotoxicosis     Follow-ups Needed After Discharge   Follow-up Appointments     Add follow up information to the AVS prior to printing      Follow-up and recommended labs and tests       Follow up with primary care provider, Waqas Guthrie, within 7 days   to evaluate treatment change.  The following labs/tests are recommended:   bmp, TFTs             Unresulted Labs Ordered in the Past 30 Days of this Admission     No orders found from 2/1/2023 to 3/4/2023.      These results will be followed up by     Discharge Disposition   Discharged to home  Condition at discharge: Stable      Hospital Course   63-year-old female with history of paroxysmal A-fib, heart failure with reduced EF, pulmonary embolism in 2022, Graves' disease, thyrotoxicosis, active tobacco abuse came into ER with a complaint of progressive worsening of shortness of breath for several weeks.  On arrival to ER patient was noted to be hypoxic requiring oxygen supplementation.  Work-up revealed acute on chronic heart failure with reduced EF.  CT chest cardiomegaly and pulmonary interstitial edema, small to medium size pericardial effusion, mildly enlarged mediastinal lymph node, mild emphysema     Assessment and plan  #Acute hypoxic respiratory failure resolved  #Acute on chronic heart failure with reduced EF  #Pericardial effusion on CT chest.  #Paroxysmal A-fib currently in sinus rhythm  Echo 3/4 EF 30 to 35% severe left atrial enlargement.  Moderate TR small pericardial effusion without tamponade physiology by echocardiogram.  Transition Lasix to oral, continue losartan and Toprol-XL.    Continue anticoagulation with Xarelto  CTA coronary- no significant obstruction       #History of pulmonary embolism in 2022  Anticoagulation with Xarelto.     #Graves' disease, thyrotoxicosis.  She was last seen by endocrine in 2018 recommendations were to continue Tapazole 10 mg once a day for at least 1 year if she is rebound into hypothyroid and then radioactive iodine treatment was recommended.  TSH 6.5  Will hold methimazole at time of discharge  F/u PCP,endocrinology (patient says she has contact info and will schedule appt)      #Active tobacco abuse  Counseled regarding cessation.  Nicotine patch.  Tobacco cessation may be difficult as her  also smokes inside the house    Consultations This Hospital Stay   CORE CLINIC EVALUATION IP CONSULT  OCCUPATIONAL THERAPY ADULT IP CONSULT  NUTRITION SERVICES ADULT IP CONSULT  CARE MANAGEMENT / SOCIAL WORK IP CONSULT  CARDIOLOGY IP CONSULT    Code Status   Full Code    Time Spent on this Encounter   I, Daily Mosquera MD, personally saw the patient today and spent greater than 30 minutes discharging this patient.       Daily Mosquera MD  [address] [address]  Phone: [phone]  Fax: [phone]  ______________________________________________________________________    Physical Exam   Vital Signs: Temp: 97.9  F (36.6  C) Temp src: Oral BP: 112/61 Pulse: 60   Resp: 16 SpO2: 97 % O2 Device: None (Room air) Oxygen Delivery: 2 LPM  Weight: 148 lbs 14.41 oz    Constitutional: awake, alert, cooperative, no apparent distress, and appears stated age  Respiratory: Bilateral clear to auscultation   cardiovascular: regular rate and rhythm, normal S1 and S2,  and no edema  GI: No scars, normal bowel sounds, soft, non-distended  Neurologic: Alert oriented x3, no focal neurodeficit noted         Primary Care Physician   Waqas Guthrie    Discharge Orders      Follow-Up with Cardiology      Reason for your hospital stay    Afib, thyrotoxicosis, CHF     Follow-up and  recommended labs and tests     Follow up with primary care provider, Waqas Guthrie, within 7 days to evaluate treatment change.  The following labs/tests are recommended: bmp, TFTs     Activity    Your activity upon discharge: activity as tolerated     Monitor and record    Weigh yourself every morning     When to contact your care team    Contact your care team If increased shortness of breath     Discharge Follow Up - with Primary Care clinic within 3-5 days (RN to schedule prior to d/c for [facility] primary care     Add follow up information to the AVS prior to printing     Diet    Follow this diet upon discharge: Orders Placed This Encounter      2 Gram Sodium Diet Other - please comment       Significant Results and Procedures   Results for orders placed or performed during the hospital encounter of 03/03/23   CT Chest Pulmonary Embolism w Contrast    Narrative    EXAM: CT CHEST PULMONARY EMBOLISM W CONTRAST  LOCATION: [facility]  DATE/TIME: 3/3/2023 8:44 PM    INDICATION: Hypoxia, history of pulmonary embolus  COMPARISON: 12/13/2022  TECHNIQUE: CT chest pulmonary angiogram during arterial phase injection of IV contrast. Multiplanar reformats and MIP reconstructions were performed. Dose reduction techniques were used.   CONTRAST: Isovue 370 75mL    FINDINGS:  ANGIOGRAM CHEST: Flow-related artifact in the pulmonary arteries, particularly centrally. No pulmonary artery filling defects. The main pulmonary artery is dilated up to 4 cm. Normal caliber aorta.    LUNGS AND PLEURA: Mild interlobular septal thickening. Mild emphysema and bronchial wall thickening. Patchy areas of atelectasis and scarring. No effusions. Small volume airway secretions.    MEDIASTINUM/AXILLAE: Cardiomegaly. Small to medium-sized pericardial effusion. Reflux of contrast into the dilated IVC and hepatic veins. There are a few borderline to mildly enlarged mediastinal lymph nodes with the largest in the right  hilum measuring   1.7 x 1.8 cm (series 5/101) and in the right upper paratracheal chain measuring 1.7 x 1.4 cm (series 5/40).    CORONARY ARTERY CALCIFICATION: Mild.    UPPER ABDOMEN: No significant finding.    MUSCULOSKELETAL: Small Schmorl's node and chronic central mild compression deformity of T12 is unchanged.      Impression    IMPRESSION:  1.  No pulmonary embolus.  2.  Cardiomegaly and pulmonary interstitial edema. Superimposed infection is in the differential.  3.  Small to medium-sized pericardial effusion.  4.  Mildly enlarged mediastinal lymph nodes may be reactive. Continued attention on follow-up.  5.  Mild emphysema.  6.  Findings suggestive of pulmonary hypertension.   Radiologist Consult For Cardiology    Narrative    OVERREAD: DETAILED [place] [facility] EXTRACARDIAC OVERREAD OF CARDIAC CT   LOCATION: [facility]  DATE/TIME: 3/6/2023 2:35 PM    INDICATION: Chest pain.  TECHNIQUE: Dose reduction techniques were used.  COMPARISON: None.    FINDINGS:    LIMITED CHEST: Negative.  LIMITED MEDIASTINUM: Negative.  LIMITED UPPER ABDOMEN: Negative.      Impression    IMPRESSION:    1.  No significant incidental extracardiac findings.  2.  Please refer to cardiologist's dictation for the cardiac CT report.   Echocardiogram Complete     Value    LVEF  30-35%    Narrative    [id number]  [id number]  [id number]  [id number]^BRYANT^AROLDO^[place] [address]     Name: DANIELA BOONE  MRN: [id number]  : 1960  Study Date: 2023 08:14 AM  Age: 63 yrs  Gender: Female  Patient Location: [facility]  Reason For Study: Heart Failure  Ordering Physician: AROLDO HURTADO  Performed By: MB     BSA: 1.8 m2  Height: 64 in  Weight: 155 lb  HR: 65  BP: 116/60 mmHg  ______________________________________________________________________________  Procedure  Complete Echo  Adult.  ______________________________________________________________________________  Interpretation Summary     Normal left-ventricular size. Left ventricular systolic function is moderately  globally reduced. Left ventricular ejection fraction estimated at 30 to  35%.Diastolic Doppler findings (E/E' ratio and/or other parameters) suggest  left ventricular filling pressures are increased.  Mild to moderate right ventricular enlargement. Mild decrease in RV systolic  function suggested.  Severe left atrial enlargement. Moderate to severe right atrial enlargement.  Myxomatous changes of the mitral valve. Eccentric mitral regurgitation that  appears moderately severe.(2-3+)  Moderate tricuspid regurgitation. Estimate of RV systolic pressure is normal  at 24 mmHg possible atrial pressure.  Mild aortic insufficiency  IVC diameter >2.1 cm collapsing <50% with sniff suggests a high RA pressure  estimated at 15 mmHg or greater.  Small pericardial effusion. Measures approximate 1.1 cm posteriorly. No  suggestion of tamponade physiology by echocardiography.  Compared to the study October 2022. Heart rate is slower and now in sinus  rhythm. Estimate of LV systolic function is similar.Smalll pericardial  effsuion appears unchanged  ______________________________________________________________________________  Left Ventricle  The left ventricle is normal in size. There is normal left ventricular wall  thickness. The visual ejection fraction is 30-35%. Diastolic Doppler findings  (E/E' ratio and/or other parameters) suggest left ventricular filling  pressures are increased. There is moderate global hypokinesia of the left  ventricle.     Right Ventricle  The right ventricle is mild to moderately dilated. Moderately decreased right  ventricular systolic function. TAPSE is abnormal, which is consistent with  abnormal right ventricular systolic function.     Atria  The left atrium is severely dilated. The right atrium is  moderate to severely  dilated.     Mitral Valve  The mitral valve leaflets appear thickened, but open well. The mitral leaflets  appear thickened, hooded, and/or redundant, consistent with myxomatous  degeneration. The mitral regurgitant jet is eccentrically directed. There is  moderate to mod-severe (2-3+) mitral regurgitation.     Tricuspid Valve  The tricuspid valve is not well visualized, but is grossly normal. The right  ventricular systolic pressure is approximated at 24.2 mmHg plus the right  atrial pressure. There is moderate (2+) tricuspid regurgitation.     Aortic Valve  The aortic valve is trileaflet with aortic valve sclerosis. There is mild (1+)  aortic regurgitation. No hemodynamically significant valvular aortic stenosis.     Pulmonic Valve  The pulmonic valve is not well seen, but is grossly normal. There is trace  pulmonic valvular regurgitation.     Vessels  The aorta root is normal. Normal size ascending aorta. IVC diameter >2.1 cm  collapsing <50% with sniff suggests a high RA pressure estimated at 15 mmHg or  greater.     Pericardium  Small pericardial effusion. The pericardial effusion measures approximately  1.1 cm posteriorly.     ______________________________________________________________________________  MMode/2D Measurements & Calculations  IVSd: 1.1 cm  LVIDd: 5.1 cm  LVIDs: 4.2 cm  LVPWd: 0.91 cm  FS: 18.0 %     LV mass(C)d: 188.7 grams  LV mass(C)dI: 107.5 grams/m2  Ao root diam: 3.4 cm  asc Aorta Diam: 3.5 cm  LVOT diam: 2.0 cm  LVOT area: 3.3 cm2  LA Volume Indexed (AL/bp): 71.7 ml/m2  RWT: 0.36     Time Measurements  MM HR: 62.0 BPM     Doppler Measurements & Calculations  MV E max kathryn: 97.5 cm/sec  MV A max kathryn: 28.6 cm/sec  MV E/A: 3.4  MV max P.9 mmHg  MV mean P.3 mmHg  MV V2 VTI: 29.5 cm  MVA(VTI): 1.3 cm2  MV dec slope: 710.0 cm/sec2  MV dec time: 0.14 sec  Ao V2 max: 97.7 cm/sec  Ao max P.0 mmHg  Ao V2 mean: 63.4 cm/sec  Ao mean P.9 mmHg  Ao V2 VTI: 15.6  cm  JOSE(I,D): 2.5 cm2  JOSE(V,D): 2.6 cm2  AI P1/2t: 574.9 msec  LV V1 max P.3 mmHg  LV V1 max: 76.6 cm/sec  LV V1 VTI: 12.1 cm  SV(LVOT): 39.5 ml  SI(LVOT): 22.5 ml/m2  PA acc time: 0.10 sec  TR max iraj: 246.1 cm/sec  TR max P.2 mmHg  AV Iraj Ratio (DI): 0.78  JOSE Index (cm2/m2): 1.4  E/E': 24.4  E/E' av.0  Lateral E/e': 11.5  Medial E/e': 24.5     Peak E' Iraj: 4.0 cm/sec     ______________________________________________________________________________  Report approved by: Prakash Ramsay 2023 10:10 AM         CTA Angiogram coronary artery     Value    BSA 1.72    CCTA ASCENDING AORTA 3.6    CCTA SINUS 3.7    Narrative      Minimal nonobstructive coronary artery disease.    Mild biventricular enlargement.    Severe biatrial enlargement.    Myxomatous appearance of the mitral valve. Study protocol was not   optimized to evaluate mitral valve function.            Discharge Medications   Discharge Medication List as of 3/6/2023  4:24 PM      START taking these medications    Details   furosemide (LASIX) 40 MG tablet Take 1 tablet (40 mg) by mouth daily, Disp-30 tablet, R-0, E-Prescribe      losartan (COZAAR) 25 MG tablet Take 0.5 tablets (12.5 mg) by mouth daily, Disp-30 tablet, R-0, E-Prescribe      metoprolol succinate ER (TOPROL XL) 25 MG 24 hr tablet Take 1 tablet (25 mg) by mouth daily, Disp-30 tablet, R-0, E-Prescribe      nicotine (NICODERM CQ) 21 MG/24HR 24 hr patch Place 1 patch onto the skin daily, Disp-30 patch, R-0, E-Prescribe         CONTINUE these medications which have NOT CHANGED    Details   multivitamin therapeutic tablet [MULTIVITAMIN THERAPEUTIC TABLET] Take 1 tablet by mouth daily., Historical      rivaroxaban ANTICOAGULANT (XARELTO) 20 MG TABS tablet Take 1 tablet (20 mg) by mouth daily (with dinner) (Blood thinner)- need to take indefinitely to prevent blood clots, Disp-30 tablet, R-0, E-Prescribe         STOP taking these medications       methimazole (TAPAZOLE) 5 MG  tablet Comments:   Reason for Stopping:             Allergies   No Known Allergies

## 2023-03-06 NOTE — PLAN OF CARE
Problem: Plan of Care - These are the overarching goals to be used throughout the patient stay.    Goal: Plan of Care Review  Description: The Plan of Care Review/Shift note should be completed every shift.  The Outcome Evaluation is a brief statement about your assessment that the patient is improving, declining, or no change.  This information will be displayed automatically on your shift note.  Outcome: Progressing     Pt discharged home with spouse. Pt wanted to ambulate vs taking a wheelchair. Discharge instructions given, questions answered. IV taken out and Tele taken off.

## 2023-03-06 NOTE — PROGRESS NOTES
NUTRITION EDUCATION      REASON FOR ASSESSMENT:  CHF diet education     NUTRITION HISTORY:  Information obtained from pt  She states she has never been told to be on a low sodium diet so she was on a regular diet at home. She states her intake is good.       CURRENT DIET:  2g Na    NUTRITION DIAGNOSIS:  Food- and nutrition-related knowledge deficit R/t new diet    INTERVENTIONS:  Provided CHF packet and dicussed low sodium diet with pt. She was sleepy but paid attention.       Goals:      *  Patient will verbalize understanding of diet       *  All of the above goals met during the education session    Follow Up/Monitoring:      *  Provided RD contact information for future questions      *  Recommended Out-Patient Nutrition Referral, if further diet instructions are needed

## 2023-03-06 NOTE — PROGRESS NOTES
Coronary CTA:  Pt tolerated procedure well.  Received with HR:  59  B)P:  107/62.  VSS.  Post procedure HR:  60  BP:  112/61.  Pt instructed to drink an extra 6-8 glasses of water today above her usual intake. Transferring back to rm [id number].

## 2023-03-08 ENCOUNTER — PATIENT OUTREACH (OUTPATIENT)
Dept: CARE COORDINATION | Facility: CLINIC | Age: 63
End: 2023-03-08
Payer: COMMERCIAL

## 2023-03-08 NOTE — PROGRESS NOTES
Clinic Care Coordination Contact  [facility]/Voicemail       Clinical Data: Care Coordinator Outreach  Outreach attempted x 2.  Left message on patient's voicemail with call back information and requested return call.  Care Coordinator will do no further outreaches at this time.    Lacy Moran  [phone]  Care

## 2023-03-14 PROBLEM — R93.1 DECREASED CARDIAC EJECTION FRACTION: Chronic | Status: RESOLVED | Noted: 2022-10-21 | Resolved: 2023-03-14

## 2023-03-14 PROBLEM — I50.9 CONGESTIVE HEART FAILURE, UNSPECIFIED HF CHRONICITY, UNSPECIFIED HEART FAILURE TYPE (H): Status: RESOLVED | Noted: 2023-03-03 | Resolved: 2023-03-14

## 2023-03-14 PROBLEM — I50.23 ACUTE ON CHRONIC HFREF (HEART FAILURE WITH REDUCED EJECTION FRACTION) (H): Status: ACTIVE | Noted: 2023-03-14

## 2023-03-14 PROBLEM — I50.9 ACUTE ON CHRONIC CONGESTIVE HEART FAILURE, UNSPECIFIED HEART FAILURE TYPE (H): Chronic | Status: RESOLVED | Noted: 2022-10-20 | Resolved: 2023-03-14

## 2023-03-15 ENCOUNTER — OFFICE VISIT (OUTPATIENT)
Dept: CARDIOLOGY | Facility: CLINIC | Age: 63
End: 2023-03-15
Payer: COMMERCIAL

## 2023-03-15 ENCOUNTER — APPOINTMENT (OUTPATIENT)
Dept: CARDIOLOGY | Facility: CLINIC | Age: 63
End: 2023-03-15
Payer: COMMERCIAL

## 2023-03-15 VITALS
HEART RATE: 72 BPM | SYSTOLIC BLOOD PRESSURE: 130 MMHG | WEIGHT: 145.1 LBS | DIASTOLIC BLOOD PRESSURE: 78 MMHG | BODY MASS INDEX: 24.77 KG/M2 | RESPIRATION RATE: 16 BRPM | OXYGEN SATURATION: 100 % | HEIGHT: 64 IN

## 2023-03-15 DIAGNOSIS — J96.01 ACUTE RESPIRATORY FAILURE WITH HYPOXIA (H): ICD-10-CM

## 2023-03-15 DIAGNOSIS — Z86.39 HISTORY OF GRAVES' DISEASE: ICD-10-CM

## 2023-03-15 DIAGNOSIS — I42.0 DILATED CARDIOMYOPATHY (H): ICD-10-CM

## 2023-03-15 DIAGNOSIS — I48.0 PAROXYSMAL ATRIAL FIBRILLATION (H): Chronic | ICD-10-CM

## 2023-03-15 DIAGNOSIS — I50.23 ACUTE ON CHRONIC HFREF (HEART FAILURE WITH REDUCED EJECTION FRACTION) (H): Primary | ICD-10-CM

## 2023-03-15 DIAGNOSIS — Z86.711 HISTORY OF PULMONARY EMBOLISM: ICD-10-CM

## 2023-03-15 DIAGNOSIS — I34.0 NONRHEUMATIC MITRAL VALVE REGURGITATION: ICD-10-CM

## 2023-03-15 DIAGNOSIS — R79.89 ELEVATED TSH: ICD-10-CM

## 2023-03-15 DIAGNOSIS — Z72.0 TOBACCO USER: ICD-10-CM

## 2023-03-15 LAB
ANION GAP SERPL CALCULATED.3IONS-SCNC: 14 MMOL/L (ref 5–18)
BUN SERPL-MCNC: 41 MG/DL (ref 8–22)
CALCIUM SERPL-MCNC: 9.6 MG/DL (ref 8.5–10.5)
CHLORIDE BLD-SCNC: 107 MMOL/L (ref 98–107)
CO2 SERPL-SCNC: 21 MMOL/L (ref 22–31)
CREAT SERPL-MCNC: 1.02 MG/DL (ref 0.6–1.1)
GFR SERPL CREATININE-BSD FRML MDRD: 62 ML/MIN/1.73M2
GLUCOSE BLD-MCNC: 79 MG/DL (ref 70–125)
POTASSIUM BLD-SCNC: 3.9 MMOL/L (ref 3.5–5)
SODIUM SERPL-SCNC: 142 MMOL/L (ref 136–145)

## 2023-03-15 PROCEDURE — 99215 OFFICE O/P EST HI 40 MIN: CPT | Performed by: NURSE PRACTITIONER

## 2023-03-15 PROCEDURE — 80048 BASIC METABOLIC PNL TOTAL CA: CPT | Performed by: NURSE PRACTITIONER

## 2023-03-15 PROCEDURE — 36415 COLL VENOUS BLD VENIPUNCTURE: CPT | Performed by: NURSE PRACTITIONER

## 2023-03-15 RX ORDER — METOPROLOL SUCCINATE 25 MG/1
50 TABLET, EXTENDED RELEASE ORAL DAILY
Qty: 180 TABLET | Refills: 0 | Status: SHIPPED | OUTPATIENT
Start: 2023-03-15

## 2023-03-15 NOTE — PATIENT INSTRUCTIONS
Kallie Barker,    It was a pleasure to see you today at the [facility].     My recommendations after this visit include:    -Increase her metoprolol succinate from 25 mg to 50 mg daily.  Please call if you develop lightheadedness, dizziness excessive fatigue.  You may take this medication at bedtime if you develop fatigue.    -We will follow-up with you once your lab results back    - Follow up with Lily in 2-3 weeks     - Follow up with Dr. Correa in 4-6 weeks     - Please follow up with primary provider in 5-7 days per recommendation from recent hospitalization    -Follow-up with endocrinology as planned on 4/4/2023    - Please call Heart Failure Nurse Line at [phone], if you have any questions or concerns

## 2023-03-15 NOTE — PROGRESS NOTES
Assessment/Recommendations   Assessment:    1.  Acute on chronic systolic heart failure with LVEF of 30 to 35% per echo on 3/4/2023, NYHA Class I:Recent hospitalization with acute on chronic systolic heart failure.  BNP was found elevated up in 2000's.  Echo showed mild decrease in RV function and mild eczematous changes of mitral valve with moderate to severe mitral regurgitation with small pericardial effusion noted.    Patient was diuresed with 2.4 L removed.    Her admission weight was 155 pounds and discharge weight was 148 pounds.    Her current home weight is stable at 144 to 145 pounds since discharge from the hospital.  On low salt diet< 2000 mg per day  She reports adequate fluid intake.    Heart failure regimen includes:    -Beta-blocker therapy with metoprolol succinate 25 mg daily-started in the hospital    -ARB therapy with Losartan 25 mg daily-started in the hospital    -Not on aldosterone blocker therapy with    -Diuretic therapy with furosemide 40 mg daily-started in the hospital    Most recent BMP done on 3/6/2023 in the hospital showed sodium level 139, potassium 3.5, BUN 29 and creatinine 0.90.    We discussed and reviewed about heart failure, medication management, and lifestyle management including low sodium diet <2 g/day, daily weight, and staying physically active as tolerated. Patient already met with Марина [facility] Core nurse clinician for heart failure education in the hospital.    2.  Paroxysmal atrial fibrillation: History of atrial fibrillation associated with thyrotoxicosis and Graves' disease in 2018.  Recent hospitalization October 2022 for acute pulmonary embolism.  Patient is on Xarelto for atrial fibrillation and pulmonary embolism.    Heart rate stable.    3.  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 Correa recommended GLENYS for further evaluation.  Stable.    4.  Mild coronary artery disease per CT coronary angiogram on 3/6/2023: Denies chest pain.      5.  Tobacco abuse: She has cut back on smoking significantly.  She is currently smoking 2 cigarettes/day.  We discussed about the importance of smoking cessation with cardiovascular risk, stroke, and lung cancer.    6.  Graves' disease, thyrotoxicosis: With recent hospitalization TSH was found elevated at 6.5.  Methimazole was held.  Patient was recommended follow-up with endocrinology.  Patient has follow-up appointment with endocrinology on 4/4/2023.  She reached out to her endocrinology and restarted on methimazole.    Plan/Recommendation:  -We discussed about increasing her beta-blocker therapy to improve her systolic dysfunction which she agreed.  -Increase metoprolol succinate from 25 mg to 50 mg daily.  May take it at bedtime if develops fatigue.  We also discussed about importance of staying compliant with her heart failure medications.  -She was instructed to call if experience side effects.  -BMP pending  -Reinforced low-sodium diet and daily weight monitoring.  -Follow-up with endocrinology as planned.  -Smoking cessation.  Follow-up with PCP in 5 to 7 days.  -I will discuss with Dr. Correa if and when patient to have GLENYS.    Follow up with me in 2 to 3 weeks for heart failure med titration.  Patient has seen Dr. Gonzales in 2018.  She has recently seen Dr. Correa in the hospital.  She would like to establish care with Dr. Correa.  Requested follow-up appointment with Dr. Correa in 4 to 6 weeks.     History of Present Illness/Subjective    Ms. Kallie Barker is a 63 year old female with a past medical history of paroxysmal atrial fibrillation, heart failure with reduced ejection fraction, pulmonary embolism in 2022, Graves' disease, thyrotoxicosis, and tobacco abuse who is seen at [facility] for post hospitalization heart failure follow-up.    Patient was hospitalized from March 3 through March 6 with progressive worsening shortness of breath for several weeks.  He was found  hypoxic requiring oxygen supplement.  His work-up revealed acute on chronic heart failure with reduced ejection fraction.  Chest x-ray showed cardiomegaly with pulmonary interstitial edema, small to medium size pericardial effusion, mildly enlarged mediastinal lymph node, and mild emphysema.  Echocardiogram on 3/4/2023 showed LVEF moderately reduced to 30 to 35% with severe left atrial enlargement.  Also noted moderate tricuspid regurgitation with small pericardial effusion without tamponade.  Patient was treated with IV diuretic therapy and was transitioned to oral furosemide.  Patient underwent CTA coronary suggested no significant obstruction.    Patient was last seen by me in March 2018.  She was last seen by Dr. Gonzales in April 2018  She was last seen in A-fib clinic by Sidra Baltazar CNP in July 2018    Today, Kallie reports that her shortness of breath and leg swelling has resolved.  She initially had some lightheadedness which now has resolved.  She thinks is due to starting methimazole.  She denies fatigue, lightheadedness, shortness of breath, dyspnea on exertion, orthopnea, PND, palpitations, chest pain, abdominal fullness/bloating and lower extremity edema.  She does not report any bleeding complications.  Patient reports that she has stopped taking all her medications in the last 2 to 3 years.      ECHO from 3/3/23-Reviewed:   Interpretation Summary     Normal left-ventricular size. Left ventricular systolic function is moderately  globally reduced. Left ventricular ejection fraction estimated at 30 to  35%.Diastolic Doppler findings (E/E' ratio and/or other parameters) suggest  left ventricular filling pressures are increased.  Mild to moderate right ventricular enlargement. Mild decrease in RV systolic  function suggested.  Severe left atrial enlargement. Moderate to severe right atrial enlargement.  Myxomatous changes of the mitral valve. Eccentric mitral regurgitation that  appears moderately  severe.(2-3+)  Moderate tricuspid regurgitation. Estimate of RV systolic pressure is normal  at 24 mmHg possible atrial pressure.  Mild aortic insufficiency  IVC diameter >2.1 cm collapsing <50% with sniff suggests a high RA pressure  estimated at 15 mmHg or greater.  Small pericardial effusion. Measures approximate 1.1 cm posteriorly. No  suggestion of tamponade physiology by echocardiography.  Compared to the study October 2022. Heart rate is slower and now in sinus  rhythm. Estimate of LV systolic function is similar.Smalll pericardial  effsuion appears unchanged     Physical Examination Review of Systems   There were no vitals taken for this visit.  There is no height or weight on file to calculate BMI.  Wt Readings from Last 3 Encounters:   03/06/23 67.5 kg (148 lb 14.4 oz)   10/23/22 74 kg (163 lb 3.2 oz)   10/01/18 58.1 kg (128 lb)     General Appearance:   no distress, normal body habitus   ENT/Mouth: membranes moist, no oral lesions or bleeding gums.      EYES:  no scleral icterus, normal conjunctivae   Neck: no carotid bruits or thyromegaly   Chest/Lungs:   lungs are clear to auscultation, no rales or wheezing, equal chest wall expansion    Cardiovascular:   Heart rate regular. Normal first and second heart sounds with no murmurs, rubs, or gallops; the carotid, radial and posterior tibial pulses are intact, Jugular venous pressure flat with no edema bilaterally    Abdomen:  no organomegaly, masses, bruits, or tenderness; bowel sounds are present   Extremities   no cyanosis or clubbing   Radial pulses and Pedal pulses intact and symmetrical.  CMS intact.   Skin: no xanthelasma, warm.    Neurologic: normal  bilateral, no tremors     Psychiatric: alert and oriented x3, calm                                      Negative unless noted in HPI     Medical History  Surgical History Family History Social History   No past medical history on file. Past Surgical History:   Procedure Laterality Date      APPENDECTOMY       CARDIOVERSION  2018     PICC  3/13/2018         No family history on file. Social History     Socioeconomic History     Marital status:      Spouse name: Not on file     Number of children: Not on file     Years of education: Not on file     Highest education level: Not on file   Occupational History     Not on file   Tobacco Use     Smoking status: Former     Packs/day: 1.00     Years: 30.00     Pack years: 30.00     Types: Cigarettes     Quit date: 2018     Years since quittin.0     Smokeless tobacco: Never   Substance and Sexual Activity     Alcohol use: No     Drug use: No     Sexual activity: Not on file   Other Topics Concern     Not on file   Social History Narrative     Not on file     Social Determinants of Health     Financial Resource Strain: Not on file   Food Insecurity: Not on file   Transportation Needs: Not on file   Physical Activity: Not on file   Stress: Not on file   Social Connections: Not on file   Intimate Partner Violence: Not on file   Housing Stability: Not on file          Medications  Allergies   Current Outpatient Medications   Medication Sig Dispense Refill     furosemide (LASIX) 40 MG tablet Take 1 tablet (40 mg) by mouth daily 30 tablet 0     losartan (COZAAR) 25 MG tablet Take 0.5 tablets (12.5 mg) by mouth daily 30 tablet 0     metoprolol succinate ER (TOPROL XL) 25 MG 24 hr tablet Take 1 tablet (25 mg) by mouth daily 30 tablet 0     multivitamin therapeutic tablet [MULTIVITAMIN THERAPEUTIC TABLET] Take 1 tablet by mouth daily.       nicotine (NICODERM CQ) 21 MG/24HR 24 hr patch Place 1 patch onto the skin daily 30 patch 0     rivaroxaban ANTICOAGULANT (XARELTO) 20 MG TABS tablet Take 1 tablet (20 mg) by mouth daily (with dinner) (Blood thinner)- need to take indefinitely to prevent blood clots 30 tablet 0    No Known Allergies      Lab Results    Chemistry/lipid CBC Cardiac Enzymes/BNP/TSH/INR   Lab Results   Component Value Date    CHOL 193  03/05/2023    HDL 35 (L) 03/05/2023    TRIG 86 03/05/2023    BUN 29 (H) 03/06/2023     03/06/2023    CO2 18 (L) 03/06/2023    Lab Results   Component Value Date    WBC 6.9 03/03/2023    HGB 14.7 03/03/2023    HCT 43.8 03/03/2023    MCV 86 03/03/2023     03/03/2023    Lab Results   Component Value Date    TROPONINI 0.07 03/03/2023    BNP 2,506 (H) 03/03/2023    TSH 6.57 (H) 03/03/2023    INR 1.49 (H) 10/20/2022        40  minutes spent on the date of encounter doing chart review, review of test results, interpretation with above tests, patient visit, documentation and discussion with other provider.        This note has been dictated using voice recognition software. Any grammatical, typographical, or context distortions are unintentional and inherent to the software

## 2023-03-15 NOTE — LETTER
3/15/2023    [facility]  [facility] [address]  [facility] [id number]    RE: Kallie Barker       Dear Colleague,     I had the pleasure of seeing Kallie Barker in the [facility].          Assessment/Recommendations   Assessment:    1.  Acute on chronic systolic heart failure with LVEF of 30 to 35% per echo on 3/4/2023, NYHA Class I:Recent hospitalization with acute on chronic systolic heart failure.  BNP was found elevated up in 2000's.  Echo showed mild decrease in RV function and mild eczematous changes of mitral valve with moderate to severe mitral regurgitation with small pericardial effusion noted.    Patient was diuresed with 2.4 L removed.    Her admission weight was 155 pounds and discharge weight was 148 pounds.    Her current home weight is stable at 144 to 145 pounds since discharge from the hospital.  On low salt diet< 2000 mg per day  She reports adequate fluid intake.    Heart failure regimen includes:    -Beta-blocker therapy with metoprolol succinate 25 mg daily-started in the hospital    -ARB therapy with Losartan 25 mg daily-started in the hospital    -Not on aldosterone blocker therapy with    -Diuretic therapy with furosemide 40 mg daily-started in the hospital    Most recent BMP done on 3/6/2023 in the hospital showed sodium level 139, potassium 3.5, BUN 29 and creatinine 0.90.    We discussed and reviewed about heart failure, medication management, and lifestyle management including low sodium diet <2 g/day, daily weight, and staying physically active as tolerated. Patient already met with Марина HF Core nurse clinician for heart failure education in the hospital.    2.  Paroxysmal atrial fibrillation: History of atrial fibrillation associated with thyrotoxicosis and Graves' disease in 2018.  Recent hospitalization October 2022 for acute pulmonary embolism.  Patient is on Xarelto for atrial fibrillation and pulmonary  embolism.    Heart rate stable.    3.  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 Correa recommended GLENYS for further evaluation.  Stable.    4.  Mild coronary artery disease per CT coronary angiogram on 3/6/2023: Denies chest pain.     5.  Tobacco abuse: She has cut back on smoking significantly.  She is currently smoking 2 cigarettes/day.  We discussed about the importance of smoking cessation with cardiovascular risk, stroke, and lung cancer.    6.  Graves' disease, thyrotoxicosis: With recent hospitalization TSH was found elevated at 6.5.  Methimazole was held.  Patient was recommended follow-up with endocrinology.  Patient has follow-up appointment with endocrinology on 4/4/2023.  She reached out to her endocrinology and restarted on methimazole.    Plan/Recommendation:  -We discussed about increasing her beta-blocker therapy to improve her systolic dysfunction which she agreed.  -Increase metoprolol succinate from 25 mg to 50 mg daily.  May take it at bedtime if develops fatigue.  We also discussed about importance of staying compliant with her heart failure medications.  -She was instructed to call if experience side effects.  -BMP pending  -Reinforced low-sodium diet and daily weight monitoring.  -Follow-up with endocrinology as planned.  -Smoking cessation.  Follow-up with PCP in 5 to 7 days.  -I will discuss with Dr. Correa if and when patient to have GLENYS.    Follow up with me in 2 to 3 weeks for heart failure med titration.  Patient has seen Dr. Gonzales in 2018.  She has recently seen Dr. Correa in the hospital.  She would like to establish care with Dr. Correa.  Requested follow-up appointment with Dr. Correa in 4 to 6 weeks.     History of Present Illness/Subjective    Ms. Kallie Barker is a 63 year old female with a past medical history of paroxysmal atrial fibrillation, heart failure with reduced ejection fraction, pulmonary embolism in 2022, Graves' disease, thyrotoxicosis, and tobacco  abuse who is seen at [facility] for post hospitalization heart failure follow-up.    Patient was hospitalized from March 3 through March 6 with progressive worsening shortness of breath for several weeks.  He was found hypoxic requiring oxygen supplement.  His work-up revealed acute on chronic heart failure with reduced ejection fraction.  Chest x-ray showed cardiomegaly with pulmonary interstitial edema, small to medium size pericardial effusion, mildly enlarged mediastinal lymph node, and mild emphysema.  Echocardiogram on 3/4/2023 showed LVEF moderately reduced to 30 to 35% with severe left atrial enlargement.  Also noted moderate tricuspid regurgitation with small pericardial effusion without tamponade.  Patient was treated with IV diuretic therapy and was transitioned to oral furosemide.  Patient underwent CTA coronary suggested no significant obstruction.    Patient was last seen by me in March 2018.  She was last seen by Dr. Gonzales in April 2018  She was last seen in A-fib clinic by Sidra Baltazar CNP in July 2018    Today, Kallie reports that her shortness of breath and leg swelling has resolved.  She initially had some lightheadedness which now has resolved.  She thinks is due to starting methimazole.  She denies fatigue, lightheadedness, shortness of breath, dyspnea on exertion, orthopnea, PND, palpitations, chest pain, abdominal fullness/bloating and lower extremity edema.  She does not report any bleeding complications.  Patient reports that she has stopped taking all her medications in the last 2 to 3 years.      ECHO from 3/3/23-Reviewed:   Interpretation Summary     Normal left-ventricular size. Left ventricular systolic function is moderately  globally reduced. Left ventricular ejection fraction estimated at 30 to  35%.Diastolic Doppler findings (E/E' ratio and/or other parameters) suggest  left ventricular filling pressures are increased.  Mild to moderate right  ventricular enlargement. Mild decrease in RV systolic  function suggested.  Severe left atrial enlargement. Moderate to severe right atrial enlargement.  Myxomatous changes of the mitral valve. Eccentric mitral regurgitation that  appears moderately severe.(2-3+)  Moderate tricuspid regurgitation. Estimate of RV systolic pressure is normal  at 24 mmHg possible atrial pressure.  Mild aortic insufficiency  IVC diameter >2.1 cm collapsing <50% with sniff suggests a high RA pressure  estimated at 15 mmHg or greater.  Small pericardial effusion. Measures approximate 1.1 cm posteriorly. No  suggestion of tamponade physiology by echocardiography.  Compared to the study October 2022. Heart rate is slower and now in sinus  rhythm. Estimate of LV systolic function is similar.Smalll pericardial  effsuion appears unchanged     Physical Examination Review of Systems   There were no vitals taken for this visit.  There is no height or weight on file to calculate BMI.  Wt Readings from Last 3 Encounters:   03/06/23 67.5 kg (148 lb 14.4 oz)   10/23/22 74 kg (163 lb 3.2 oz)   10/01/18 58.1 kg (128 lb)     General Appearance:   no distress, normal body habitus   ENT/Mouth: membranes moist, no oral lesions or bleeding gums.      EYES:  no scleral icterus, normal conjunctivae   Neck: no carotid bruits or thyromegaly   Chest/Lungs:   lungs are clear to auscultation, no rales or wheezing, equal chest wall expansion    Cardiovascular:   Heart rate regular. Normal first and second heart sounds with no murmurs, rubs, or gallops; the carotid, radial and posterior tibial pulses are intact, Jugular venous pressure flat with no edema bilaterally    Abdomen:  no organomegaly, masses, bruits, or tenderness; bowel sounds are present   Extremities   no cyanosis or clubbing   Radial pulses and Pedal pulses intact and symmetrical.  CMS intact.   Skin: no xanthelasma, warm.    Neurologic: normal  bilateral, no tremors     Psychiatric: alert and  oriented x3, calm                                      Negative unless noted in HPI     Medical History  Surgical History Family History Social History   No past medical history on file. Past Surgical History:   Procedure Laterality Date     APPENDECTOMY       CARDIOVERSION  2018     PICC  3/13/2018         No family history on file. Social History     Socioeconomic History     Marital status:      Spouse name: Not on file     Number of children: Not on file     Years of education: Not on file     Highest education level: Not on file   Occupational History     Not on file   Tobacco Use     Smoking status: Former     Packs/day: 1.00     Years: 30.00     Pack years: 30.00     Types: Cigarettes     Quit date: 2018     Years since quittin.0     Smokeless tobacco: Never   Substance and Sexual Activity     Alcohol use: No     Drug use: No     Sexual activity: Not on file   Other Topics Concern     Not on file   Social History Narrative     Not on file     Social Determinants of Health     Financial Resource Strain: Not on file   Food Insecurity: Not on file   Transportation Needs: Not on file   Physical Activity: Not on file   Stress: Not on file   Social Connections: Not on file   Intimate Partner Violence: Not on file   Housing Stability: Not on file          Medications  Allergies   Current Outpatient Medications   Medication Sig Dispense Refill     furosemide (LASIX) 40 MG tablet Take 1 tablet (40 mg) by mouth daily 30 tablet 0     losartan (COZAAR) 25 MG tablet Take 0.5 tablets (12.5 mg) by mouth daily 30 tablet 0     metoprolol succinate ER (TOPROL XL) 25 MG 24 hr tablet Take 1 tablet (25 mg) by mouth daily 30 tablet 0     multivitamin therapeutic tablet [MULTIVITAMIN THERAPEUTIC TABLET] Take 1 tablet by mouth daily.       nicotine (NICODERM CQ) 21 MG/24HR 24 hr patch Place 1 patch onto the skin daily 30 patch 0     rivaroxaban ANTICOAGULANT (XARELTO) 20 MG TABS tablet Take 1 tablet (20 mg) by  mouth daily (with dinner) (Blood thinner)- need to take indefinitely to prevent blood clots 30 tablet 0    No Known Allergies      Lab Results    Chemistry/lipid CBC Cardiac Enzymes/BNP/TSH/INR   Lab Results   Component Value Date    CHOL 193 03/05/2023    HDL 35 (L) 03/05/2023    TRIG 86 03/05/2023    BUN 29 (H) 03/06/2023     03/06/2023    CO2 18 (L) 03/06/2023    Lab Results   Component Value Date    WBC 6.9 03/03/2023    HGB 14.7 03/03/2023    HCT 43.8 03/03/2023    MCV 86 03/03/2023     03/03/2023    Lab Results   Component Value Date    TROPONINI 0.07 03/03/2023    BNP 2,506 (H) 03/03/2023    TSH 6.57 (H) 03/03/2023    INR 1.49 (H) 10/20/2022        40  minutes spent on the date of encounter doing chart review, review of test results, interpretation with above tests, patient visit, documentation and discussion with other provider.        This note has been dictated using voice recognition software. Any grammatical, typographical, or context distortions are unintentional and inherent to the software            Thank you for allowing me to participate in the care of your patient.      Sincerely,     HARINDER Hubbard [facility]  cc:   Tiffanie Magallanes MD  [address] [place] [address]

## 2023-03-16 ENCOUNTER — TRANSCRIBE ORDERS (OUTPATIENT)
Dept: CARDIOLOGY | Facility: CLINIC | Age: 63
End: 2023-03-16
Payer: COMMERCIAL

## 2023-03-16 ENCOUNTER — TELEPHONE (OUTPATIENT)
Dept: CARDIOLOGY | Facility: CLINIC | Age: 63
End: 2023-03-16
Payer: COMMERCIAL

## 2023-03-16 DIAGNOSIS — R93.1 DECREASED CARDIAC EJECTION FRACTION: Chronic | ICD-10-CM

## 2023-03-16 RX ORDER — FUROSEMIDE 20 MG
20 TABLET ORAL DAILY
Qty: 90 TABLET | Refills: 3 | Status: SHIPPED | OUTPATIENT
Start: 2023-03-16 | End: 2023-05-11

## 2023-03-17 ENCOUNTER — TELEPHONE (OUTPATIENT)
Dept: CARDIOLOGY | Facility: CLINIC | Age: 63
End: 2023-03-17
Payer: COMMERCIAL

## 2023-03-17 ENCOUNTER — PREP FOR PROCEDURE (OUTPATIENT)
Dept: CARDIOLOGY | Facility: CLINIC | Age: 63
End: 2023-03-17
Payer: COMMERCIAL

## 2023-03-17 DIAGNOSIS — I34.0 NONRHEUMATIC MITRAL VALVE REGURGITATION: Primary | ICD-10-CM

## 2023-03-17 RX ORDER — SODIUM CHLORIDE 9 MG/ML
INJECTION, SOLUTION INTRAVENOUS CONTINUOUS
Status: CANCELLED | OUTPATIENT
Start: 2023-03-17

## 2023-03-17 RX ORDER — LIDOCAINE 40 MG/G
CREAM TOPICAL
Status: CANCELLED | OUTPATIENT
Start: 2023-03-17

## 2023-03-17 NOTE — TELEPHONE ENCOUNTER
===View-only below this line===  ----- Message -----  From: Tyrell Correa MD  Sent: 3/17/2023   1:30 PM CDT  To: LUPE Soto.

## 2023-03-17 NOTE — TELEPHONE ENCOUNTER
Pt scheduled with Dr Correa 4/12/23.  Ok to wait to schedule GLENYS until after that?  -rah    ===View-only below this line===  ----- Message -----  From: Mel Richmond  Sent: 3/17/2023  12:52 PM CDT  To: Lorena Cleaning, Ana Rowell RN, [facility]  Subject: RE: GLENYS                                          Pt does not want to schedule until she speaks with Dr Correa first.

## 2023-03-17 NOTE — TELEPHONE ENCOUNTER
GLENYS per 3/5/23 progress note:  Eventual GLENYS to further assess the mitral valve and myxomatous valve.    H&P with Lily 3/15/23.  Message sent to scheduling team.  -[facility]    ----- Message -----  From: Tyrell Correa MD  Sent: 3/16/2023  12:42 PM CDT  To: HARINDER Hubbard CNP    I am not really seeing new patients to me but if she insists I guess I can do it.  I would recommend that if she is willing that we pursue the GLENYS.    ----- Message -----  From: Lily Olivas APRN CNP  Sent: 3/15/2023   4:46 PM CDT  To: Tyrell Correa MD    Hi Dr. Correa,  Saw patient today for heart failure follow-up.  She is doing well.  She has seen Dr. Guerin in 2018.  She recently started in the hospital and would prefer to establish care with you.  She is scheduled to see you in April.  You have mentioned about doing a GLENYS to reevaluate her MR.  I am seeing her back in couple weeks for heart failure med titration. Let me now how soon you would recommend GLENYS?  CY

## 2023-03-30 ENCOUNTER — TRANSFERRED RECORDS (OUTPATIENT)
Dept: HEALTH INFORMATION MANAGEMENT | Facility: CLINIC | Age: 63
End: 2023-03-30
Payer: COMMERCIAL

## 2023-03-30 ENCOUNTER — LAB REQUISITION (OUTPATIENT)
Dept: LAB | Facility: CLINIC | Age: 63
End: 2023-03-30

## 2023-03-30 PROCEDURE — 80053 COMPREHEN METABOLIC PANEL: CPT | Performed by: FAMILY MEDICINE

## 2023-03-30 PROCEDURE — 85025 COMPLETE CBC W/AUTO DIFF WBC: CPT | Performed by: FAMILY MEDICINE

## 2023-03-30 PROCEDURE — 84439 ASSAY OF FREE THYROXINE: CPT | Performed by: FAMILY MEDICINE

## 2023-03-30 PROCEDURE — 84443 ASSAY THYROID STIM HORMONE: CPT | Performed by: FAMILY MEDICINE

## 2023-03-31 LAB
ALBUMIN SERPL BCG-MCNC: 4.4 G/DL (ref 3.5–5.2)
ALP SERPL-CCNC: 97 U/L (ref 35–104)
ALT SERPL W P-5'-P-CCNC: 20 U/L (ref 10–35)
ANION GAP SERPL CALCULATED.3IONS-SCNC: 19 MMOL/L (ref 7–15)
AST SERPL W P-5'-P-CCNC: 23 U/L (ref 10–35)
BASOPHILS # BLD AUTO: 0 10E3/UL (ref 0–0.2)
BASOPHILS NFR BLD AUTO: 1 %
BILIRUB SERPL-MCNC: 0.5 MG/DL
BUN SERPL-MCNC: 48.2 MG/DL (ref 8–23)
CALCIUM SERPL-MCNC: 10 MG/DL (ref 8.8–10.2)
CHLORIDE SERPL-SCNC: 103 MMOL/L (ref 98–107)
CREAT SERPL-MCNC: 1.7 MG/DL (ref 0.51–0.95)
DEPRECATED HCO3 PLAS-SCNC: 17 MMOL/L (ref 22–29)
EOSINOPHIL # BLD AUTO: 0 10E3/UL (ref 0–0.7)
EOSINOPHIL NFR BLD AUTO: 0 %
ERYTHROCYTE [DISTWIDTH] IN BLOOD BY AUTOMATED COUNT: 15.6 % (ref 10–15)
GFR SERPL CREATININE-BSD FRML MDRD: 34 ML/MIN/1.73M2
GLUCOSE SERPL-MCNC: 93 MG/DL (ref 70–99)
HCT VFR BLD AUTO: 49 % (ref 35–47)
HGB BLD-MCNC: 15.7 G/DL (ref 11.7–15.7)
IMM GRANULOCYTES # BLD: 0 10E3/UL
IMM GRANULOCYTES NFR BLD: 0 %
LYMPHOCYTES # BLD AUTO: 1.2 10E3/UL (ref 0.8–5.3)
LYMPHOCYTES NFR BLD AUTO: 22 %
MCH RBC QN AUTO: 27.8 PG (ref 26.5–33)
MCHC RBC AUTO-ENTMCNC: 32 G/DL (ref 31.5–36.5)
MCV RBC AUTO: 87 FL (ref 78–100)
MONOCYTES # BLD AUTO: 0.6 10E3/UL (ref 0–1.3)
MONOCYTES NFR BLD AUTO: 11 %
NEUTROPHILS # BLD AUTO: 3.7 10E3/UL (ref 1.6–8.3)
NEUTROPHILS NFR BLD AUTO: 66 %
NRBC # BLD AUTO: 0 10E3/UL
NRBC BLD AUTO-RTO: 0 /100
PLATELET # BLD AUTO: 212 10E3/UL (ref 150–450)
POTASSIUM SERPL-SCNC: 4.1 MMOL/L (ref 3.4–5.3)
PROT SERPL-MCNC: 7.3 G/DL (ref 6.4–8.3)
RBC # BLD AUTO: 5.64 10E6/UL (ref 3.8–5.2)
SODIUM SERPL-SCNC: 139 MMOL/L (ref 136–145)
T4 FREE SERPL-MCNC: 1.19 NG/DL (ref 0.9–1.7)
TSH SERPL DL<=0.005 MIU/L-ACNC: 1.56 UIU/ML (ref 0.3–4.2)
WBC # BLD AUTO: 5.6 10E3/UL (ref 4–11)

## 2023-04-12 ENCOUNTER — OFFICE VISIT (OUTPATIENT)
Dept: CARDIOLOGY | Facility: CLINIC | Age: 63
End: 2023-04-12
Payer: COMMERCIAL

## 2023-04-12 VITALS
OXYGEN SATURATION: 96 % | SYSTOLIC BLOOD PRESSURE: 96 MMHG | WEIGHT: 141 LBS | HEART RATE: 75 BPM | RESPIRATION RATE: 16 BRPM | BODY MASS INDEX: 24.2 KG/M2 | DIASTOLIC BLOOD PRESSURE: 64 MMHG

## 2023-04-12 DIAGNOSIS — J96.01 ACUTE RESPIRATORY FAILURE WITH HYPOXIA (H): ICD-10-CM

## 2023-04-12 DIAGNOSIS — R79.89 ELEVATED TSH: ICD-10-CM

## 2023-04-12 DIAGNOSIS — I34.0 NONRHEUMATIC MITRAL VALVE REGURGITATION: ICD-10-CM

## 2023-04-12 DIAGNOSIS — I48.0 PAROXYSMAL ATRIAL FIBRILLATION (H): Chronic | ICD-10-CM

## 2023-04-12 DIAGNOSIS — I50.23 ACUTE ON CHRONIC HFREF (HEART FAILURE WITH REDUCED EJECTION FRACTION) (H): ICD-10-CM

## 2023-04-12 DIAGNOSIS — Z72.0 TOBACCO USER: ICD-10-CM

## 2023-04-12 DIAGNOSIS — I42.0 DILATED CARDIOMYOPATHY (H): ICD-10-CM

## 2023-04-12 LAB
ANION GAP SERPL CALCULATED.3IONS-SCNC: 16 MMOL/L (ref 5–18)
BNP SERPL-MCNC: 149 PG/ML (ref 0–96)
BUN SERPL-MCNC: 33 MG/DL (ref 8–22)
CALCIUM SERPL-MCNC: 10.4 MG/DL (ref 8.5–10.5)
CHLORIDE BLD-SCNC: 103 MMOL/L (ref 98–107)
CO2 SERPL-SCNC: 21 MMOL/L (ref 22–31)
CREAT SERPL-MCNC: 2.09 MG/DL (ref 0.6–1.1)
GFR SERPL CREATININE-BSD FRML MDRD: 26 ML/MIN/1.73M2
GLUCOSE BLD-MCNC: 92 MG/DL (ref 70–125)
POTASSIUM BLD-SCNC: 4.6 MMOL/L (ref 3.5–5)
SODIUM SERPL-SCNC: 140 MMOL/L (ref 136–145)

## 2023-04-12 PROCEDURE — 99214 OFFICE O/P EST MOD 30 MIN: CPT | Performed by: INTERNAL MEDICINE

## 2023-04-12 PROCEDURE — 80048 BASIC METABOLIC PNL TOTAL CA: CPT | Performed by: INTERNAL MEDICINE

## 2023-04-12 PROCEDURE — 83880 ASSAY OF NATRIURETIC PEPTIDE: CPT | Performed by: INTERNAL MEDICINE

## 2023-04-12 PROCEDURE — 36415 COLL VENOUS BLD VENIPUNCTURE: CPT | Performed by: INTERNAL MEDICINE

## 2023-04-12 RX ORDER — METHIMAZOLE 10 MG/1
5 TABLET ORAL DAILY
COMMUNITY

## 2023-04-12 NOTE — PATIENT INSTRUCTIONS
We are going to plan a heart ultrasound and a 24 hour monitor.Please call my nurse Ana with any heart related questions.We are going to plan a transesophageal echocardiogram where you swallow a tube to allow us to look closer at the heart valve.We are going to plan a 24 hour heart monitor.Her number is [phone]

## 2023-04-12 NOTE — PROGRESS NOTES
HEART CARE ENCOUNTER CONSULTATON NOTE      [facility]  [phone]      Assessment/Recommendations   Assessment/Plan:  1.  Congestive heart failure with reduced ejection fraction.  As noted previously evaluated in 2019 with some improvement in ejection fraction after treatment for atrial fibrillation and thyrotoxicosis but not followed up in the interim.  She has a history of myxomatous mitral valve.  She was admitted last month with worsening congestive heart failure with a prior admission for pulmonary embolism in October 2022.  I met her in the hospitalization.  She appears to be well compensated today.  Plan for follow-up laboratory studies.  Was noted that her creatinine had increased to 1.7 when she saw her primary care provider and likely will need to cut back or even change the furosemide to as needed.  As noted coronary CT angiography did not suggest obstructive coronary artery disease by report.  We talked about the importance of tobacco cessation.  Consideration could be given to Jardiance.  If her ejection fraction remains less than 35% will need to consider formal consultation with EP colleagues.  Estimate of RV systolic pressure was within normal limits on the most recent echocardiogram at 24 mmHg plus right atrial pressure.    2.  Myxomatous mitral valve with mitral regurgitation described as moderately severe.  I reviewed this in detail.  We talked about the potential relationship between the mitral regurgitation and left ventricular dysfunction.  We discussed the transesophageal echocardiogram.  After discussion she is willing to proceed.  Further recommendations pending the outcome of the transesophageal echocardiogram.  It might be reasonable to consider cardiac MRI as well.  We will discuss further pending the results of the transesophageal echocardiogram.  I did also ask her to utilize a Holter monitor in the setting of reduced left ventricular systolic function.    3.   Mild nonobstructive coronary artery disease based on CT angiography.  Again it is recommended that she fully discontinue tobacco.  She states that she is working on tobacco cessation.  She is not on statin.  Lipids from March 5 finds a total cholesterol 193 with an LDL of 141.  She may benefit from statin but we decided to discuss further pending the outcome of the above.    Plan 1.  Transesophageal echocardiogram  2.  Holter monitor  3.  Laboratory studies with additional recommendations regarding medication management pending the results.  4.  Consideration of cardiac MRI  5.  Anticoagulation for pulmonary embolism per primary care.  6.  Patient previously on Tapazole.  This was placed on hold at the time of discharge with follow-up recommended.         History of Present Illness/Subjective    HPI: Kallie Barker is a 63 year old female who is seen in follow-up.  We met recently in hospital.  She had been seen previously by my colleague Dr. Gonzales but had not followed up since 2018.  He was admitted in March with congestive heart failure with reduced ejection fraction.  In 2018 her EF was as low as 20% which had improved to 52% with medical management of the thyrotoxicosis and atrial fibrillation.  Chart review indicates that she did undergo atrial fibrillation cardioversion April 2019.  She was admitted in October 2022 and cardiology was not consulted at that time.  She was admitted with an acute pulmonary embolism.  Her ejection fraction by echocardiography was reported to be 35 to 40% with a myxomatous mitral valve changes noted.  He was described as moderately severe mitral regurgitation.  We met March 2023 in the hospital with acute heart failure and she was diuresed with a BNP initially of 2506.  Echocardiogram continues to show moderate reduction in left ventricular ejection fraction of 30 to 35%.  She is feeling well since discharge from the hospital without symptoms of congestive heart failure.  She  denies orthopnea or PND.  She denies chest discomfort dizziness or lightheadedness.  I reviewed the cardiovascular findings including the echocardiogram with myxomatous changes and mitral regurgitation.  She had a CT coronary angiogram completed March 3 that demonstrated minimal nonobstructive coronary artery disease with mild biventricular enlargement and severe biatrial enlargement and myxomatous changes of the mitral valve previously described on echocardiography.    She had lab results by her primary care physician  where her creatinine was up to 1.7 but did not have any changes in her medical regimen.  She has remained on low-dose furosemide.  Plan to recheck chemistries today as well as BNP and likely will need to cut back on the furosemide if not discontinue.  I spent some time discussing the importance of further defining the mitral regurgitation especially in the setting of LV dysfunction.  She currently is only on low-dose losartan as well as metoprolol and her blood pressure today is in the 90s systolic.    Recent Echocardiogram Results:  PACS Images     Show images for Echocardiogram Complete  Echocardiogram Complete  Order: [id number]   Status: Edited Result - FINAL      Visible to patient: No (inaccessible in MyChart)      Next appt: 2023 at 09:50 AM in Cardiology (Tyrell Correa MD)      0 Result Notes  Details    Reading Physician Reading Date Result Priority   Tyrell Correa MD  [phone] 3/4/2023      Narrative & Impression  [id number]  [id number]  [id number]  [id number]^BRYANT^AROLDO^[place] [address]     Name: DANIELA BOONE  MRN: [id number]  : 1960  Study Date: 2023 08:14 AM  Age: 63 yrs  Gender: Female  Patient Location: [facility]  Reason For Study: Heart Failure  Ordering Physician: AROLDO HURTADO  Performed By: MB     BSA: 1.8 m2  Height: 64 in  Weight: 155 lb  HR: 65  BP: 116/60  mmHg  ______________________________________________________________________________  Procedure  Complete Echo Adult.  ______________________________________________________________________________  Interpretation Summary     Normal left-ventricular size. Left ventricular systolic function is moderately  globally reduced. Left ventricular ejection fraction estimated at 30 to  35%.Diastolic Doppler findings (E/E' ratio and/or other parameters) suggest  left ventricular filling pressures are increased.  Mild to moderate right ventricular enlargement. Mild decrease in RV systolic  function suggested.  Severe left atrial enlargement. Moderate to severe right atrial enlargement.  Myxomatous changes of the mitral valve. Eccentric mitral regurgitation that  appears moderately severe.(2-3+)  Moderate tricuspid regurgitation. Estimate of RV systolic pressure is normal  at 24 mmHg possible atrial pressure.  Mild aortic insufficiency  IVC diameter >2.1 cm collapsing <50% with sniff suggests a high RA pressure  estimated at 15 mmHg or greater.  Small pericardial effusion. Measures approximate 1.1 cm posteriorly. No  suggestion of tamponade physiology by echocardiography.  Compared to the study October 2022. Heart rate is slower and now in sinus  rhythm. Estimate of LV systolic function is similar.Smalll pericardial  effsuion appears unchanged  ______________________________________________________________________________  Left Ventricle  The left ventricle is normal in size. There is normal left ventricular wall  thickness. The visual ejection fraction is 30-35%. Diastolic Doppler findings  (E/E' ratio and/or other parameters) suggest left ventricular filling  pressures are increased. There is moderate global hypokinesia of the left  ventricle.     Right Ventricle  The right ventricle is mild to moderately dilated. Moderately decreased right  ventricular systolic function. TAPSE is abnormal, which is consistent with  abnormal  right ventricular systolic function.     Atria  The left atrium is severely dilated. The right atrium is moderate to severely  dilated.     Mitral Valve  The mitral valve leaflets appear thickened, but open well. The mitral leaflets  appear thickened, hooded, and/or redundant, consistent with myxomatous  degeneration. The mitral regurgitant jet is eccentrically directed. There is  moderate to mod-severe (2-3+) mitral regurgitation.     Tricuspid Valve  The tricuspid valve is not well visualized, but is grossly normal. The right  ventricular systolic pressure is approximated at 24.2 mmHg plus the right  atrial pressure. There is moderate (2+) tricuspid regurgitation.     Aortic Valve  The aortic valve is trileaflet with aortic valve sclerosis. There is mild (1+)  aortic regurgitation. No hemodynamically significant valvular aortic stenosis.     Pulmonic Valve  The pulmonic valve is not well seen, but is grossly normal. There is trace  pulmonic valvular regurgitation.     Vessels  The aorta root is normal. Normal size ascending aorta. IVC diameter >2.1 cm  collapsing <50% with sniff suggests a high RA pressure estimated at 15 mmHg or  greater.     Pericardium  Small pericardial effusion. The pericardial effusion measures approximately  1.1 cm posteriorly         Recent Coronary Angiogram Results:    CTA Angiogram coronary artery  Order# [id number]  Reading physician: Jose C Wallace MD Ordering physician: Tyrell Correa MD Study date: 2023     Patient Information    Patient Name   Kallie Barker MRN   [id number] Legal Sex   Female              Age   1960 (63 year old)     Reason for Exam  Priority: Routine  chf, cardiomyopathy     Interpretation Summary      Minimal nonobstructive coronary artery disease.    Mild biventricular enlargement.    Severe biatrial enlargement.    Myxomatous appearance of the mitral valve. Study protocol was not optimized to evaluate mitral valve function.          Physical  Examination  Review of Systems   Vitals: 96/64, weight 141 pounds, heart rate of 75 and regular  Wt Readings from Last 3 Encounters:   03/15/23 65.8 kg (145 lb 1.6 oz)   23 67.5 kg (148 lb 14.4 oz)   10/23/22 74 kg (163 lb 3.2 oz)       General Appearance:   no distress, normal body habitus   ENT/Mouth:  Wearing a facemask      EYES:  no scleral icterus, normal conjunctivae   Neck: no carotid bruits or thyromegaly   Chest/Lungs:   lungs are clear to auscultation, no rales or wheezing, , equal chest wall expansion    Cardiovascular:   Regular. Normal first and second heart sounds with 2/6 systolic murmur, rubs, or gallops; the carotid, radial and posterior tibial pulses are intact, Jugular venous pressure within normal limits, no significant edema bilaterally    Abdomen:  no organomegaly, masses, bruits, or tenderness; bowel sounds are present   Extremities: no cyanosis or clubbing   Skin: no xanthelasma, warm.    Neurologic: , no tremors     Psychiatric: alert and oriented x3, calm        Please refer above for cardiac ROS details.        Medical History  Surgical History Family History Social History   No past medical history on file.  Past Surgical History:   Procedure Laterality Date     APPENDECTOMY       CARDIOVERSION  2018     PICC  3/13/2018          No family history on file.     Social History     Socioeconomic History     Marital status:      Spouse name: Not on file     Number of children: Not on file     Years of education: Not on file     Highest education level: Not on file   Occupational History     Not on file   Tobacco Use     Smoking status: Former     Packs/day: 1.00     Years: 30.00     Pack years: 30.00     Types: Cigarettes     Quit date: 2018     Years since quittin.1     Smokeless tobacco: Never   Vaping Use     Vaping status: Not on file   Substance and Sexual Activity     Alcohol use: No     Drug use: No     Sexual activity: Not on file   Other Topics Concern      Not on file   Social History Narrative     Not on file     Social Determinants of Health     Financial Resource Strain: Not on file   Food Insecurity: Not on file   Transportation Needs: Not on file   Physical Activity: Not on file   Stress: Not on file   Social Connections: Not on file   Intimate Partner Violence: Not on file   Housing Stability: Not on file           Medications  Allergies   Current Outpatient Medications   Medication Sig Dispense Refill     furosemide (LASIX) 20 MG tablet Take 1 tablet (20 mg) by mouth daily 90 tablet 3     losartan (COZAAR) 25 MG tablet Take 0.5 tablets (12.5 mg) by mouth daily 30 tablet 0     metoprolol succinate ER (TOPROL XL) 25 MG 24 hr tablet Take 2 tablets (50 mg) by mouth daily 180 tablet 0     multivitamin therapeutic tablet [MULTIVITAMIN THERAPEUTIC TABLET] Take 1 tablet by mouth daily.       nicotine (NICODERM CQ) 21 MG/24HR 24 hr patch Place 1 patch onto the skin daily 30 patch 0     rivaroxaban ANTICOAGULANT (XARELTO) 20 MG TABS tablet Take 1 tablet (20 mg) by mouth daily (with dinner) (Blood thinner)- need to take indefinitely to prevent blood clots 30 tablet 0     No Known Allergies       Lab Results    Chemistry/lipid CBC Cardiac Enzymes/BNP/TSH/INR   Recent Labs   Lab Test 03/05/23  0755   CHOL 193   HDL 35*   *   TRIG 86     Recent Labs   Lab Test 03/05/23  0755   *     Recent Labs   Lab Test 03/30/23  1642      POTASSIUM 4.1   CHLORIDE 103   CO2 17*   GLC 93   BUN 48.2*   CR 1.70*   GFRESTIMATED 34*   ALAN 10.0     Recent Labs   Lab Test 03/30/23 1642 03/15/23  1457 03/06/23  0732   CR 1.70* 1.02 0.90     No results for input(s): A1C in the last 08884 hours.       Recent Labs   Lab Test 03/30/23  1642   WBC 5.6   HGB 15.7   HCT 49.0*   MCV 87        Recent Labs   Lab Test 03/30/23  1642 03/03/23  1947 10/23/22  0357   HGB 15.7 14.7 12.2    Recent Labs   Lab Test 03/03/23  1947 10/21/22  0253 10/20/22  1848   TROPONINI 0.07 0.03 0.03      Recent Labs   Lab Test 03/03/23  1947 10/20/22  1848 03/19/18  0709   BNP 2,506* 3,001* 2,790*     Recent Labs   Lab Test 03/30/23  1642   TSH 1.56     Recent Labs   Lab Test 10/20/22  1848 03/12/18  1935 03/02/18  0538   INR 1.49* 3.85* 1.60*        Tyrell Correa MD

## 2023-04-12 NOTE — LETTER
4/12/2023    [facility]  [facility] [address] [place]  [facility] [id number]    RE: Kallie Barker       Dear Colleague,     I had the pleasure of seeing Kallie Barker in the [facility].    HEART CARE ENCOUNTER CONSULTATON NOTE      [facility]  [phone]      Assessment/Recommendations   Assessment/Plan:  1.  Congestive heart failure with reduced ejection fraction.  As noted previously evaluated in 2019 with some improvement in ejection fraction after treatment for atrial fibrillation and thyrotoxicosis but not followed up in the interim.  She has a history of myxomatous mitral valve.  She was admitted last month with worsening congestive heart failure with a prior admission for pulmonary embolism in October 2022.  I met her in the hospitalization.  She appears to be well compensated today.  Plan for follow-up laboratory studies.  Was noted that her creatinine had increased to 1.7 when she saw her primary care provider and likely will need to cut back or even change the furosemide to as needed.  As noted coronary CT angiography did not suggest obstructive coronary artery disease by report.  We talked about the importance of tobacco cessation.  Consideration could be given to Jardiance.  If her ejection fraction remains less than 35% will need to consider formal consultation with EP colleagues.  Estimate of RV systolic pressure was within normal limits on the most recent echocardiogram at 24 mmHg plus right atrial pressure.    2.  Myxomatous mitral valve with mitral regurgitation described as moderately severe.  I reviewed this in detail.  We talked about the potential relationship between the mitral regurgitation and left ventricular dysfunction.  We discussed the transesophageal echocardiogram.  After discussion she is willing to proceed.  Further recommendations pending the outcome of the transesophageal echocardiogram.  It might be  reasonable to consider cardiac MRI as well.  We will discuss further pending the results of the transesophageal echocardiogram.  I did also ask her to utilize a Holter monitor in the setting of reduced left ventricular systolic function.    3.  Mild nonobstructive coronary artery disease based on CT angiography.  Again it is recommended that she fully discontinue tobacco.  She states that she is working on tobacco cessation.  She is not on statin.  Lipids from March 5 finds a total cholesterol 193 with an LDL of 141.  She may benefit from statin but we decided to discuss further pending the outcome of the above.    Plan 1.  Transesophageal echocardiogram  2.  Holter monitor  3.  Laboratory studies with additional recommendations regarding medication management pending the results.  4.  Consideration of cardiac MRI  5.  Anticoagulation for pulmonary embolism per primary care.  6.  Patient previously on Tapazole.  This was placed on hold at the time of discharge with follow-up recommended.         History of Present Illness/Subjective    HPI: Kallie Barker is a 63 year old female who is seen in follow-up.  We met recently in hospital.  She had been seen previously by my colleague Dr. Gonzales but had not followed up since 2018.  He was admitted in March with congestive heart failure with reduced ejection fraction.  In 2018 her EF was as low as 20% which had improved to 52% with medical management of the thyrotoxicosis and atrial fibrillation.  Chart review indicates that she did undergo atrial fibrillation cardioversion April 2019.  She was admitted in October 2022 and cardiology was not consulted at that time.  She was admitted with an acute pulmonary embolism.  Her ejection fraction by echocardiography was reported to be 35 to 40% with a myxomatous mitral valve changes noted.  He was described as moderately severe mitral regurgitation.  We met March 2023 in the hospital with acute heart failure and she was diuresed  with a BNP initially of 2506.  Echocardiogram continues to show moderate reduction in left ventricular ejection fraction of 30 to 35%.  She is feeling well since discharge from the hospital without symptoms of congestive heart failure.  She denies orthopnea or PND.  She denies chest discomfort dizziness or lightheadedness.  I reviewed the cardiovascular findings including the echocardiogram with myxomatous changes and mitral regurgitation.  She had a CT coronary angiogram completed March 3 that demonstrated minimal nonobstructive coronary artery disease with mild biventricular enlargement and severe biatrial enlargement and myxomatous changes of the mitral valve previously described on echocardiography.    She had lab results by her primary care physician  where her creatinine was up to 1.7 but did not have any changes in her medical regimen.  She has remained on low-dose furosemide.  Plan to recheck chemistries today as well as BNP and likely will need to cut back on the furosemide if not discontinue.  I spent some time discussing the importance of further defining the mitral regurgitation especially in the setting of LV dysfunction.  She currently is only on low-dose losartan as well as metoprolol and her blood pressure today is in the 90s systolic.    Recent Echocardiogram Results:  PACS Images     Show images for Echocardiogram Complete  Echocardiogram Complete  Order: [id number]  Status: Edited Result - FINAL     Visible to patient: No (inaccessible in MyChart)     Next appt: 2023 at 09:50 AM in Cardiology (Tyrell Correa MD)     0 Result Notes  Details    Reading Physician Reading Date Result Priority   Tyrell Correa MD  [phone] 3/4/2023      Narrative & Impression  [id number]  [id number]  [id number]  [id number]^BRYANT^AROLDO^[place] [address]     Name: DANIELA BOONE  MRN: [id number]  : 1960  Study Date: 2023 08:14 AM  Age: 63  yrs  Gender: Female  Patient Location: [facility]  Reason For Study: Heart Failure  Ordering Physician: AROLDO HURTADO  Performed By: MB     BSA: 1.8 m2  Height: 64 in  Weight: 155 lb  HR: 65  BP: 116/60 mmHg  ______________________________________________________________________________  Procedure  Complete Echo Adult.  ______________________________________________________________________________  Interpretation Summary     Normal left-ventricular size. Left ventricular systolic function is moderately  globally reduced. Left ventricular ejection fraction estimated at 30 to  35%.Diastolic Doppler findings (E/E' ratio and/or other parameters) suggest  left ventricular filling pressures are increased.  Mild to moderate right ventricular enlargement. Mild decrease in RV systolic  function suggested.  Severe left atrial enlargement. Moderate to severe right atrial enlargement.  Myxomatous changes of the mitral valve. Eccentric mitral regurgitation that  appears moderately severe.(2-3+)  Moderate tricuspid regurgitation. Estimate of RV systolic pressure is normal  at 24 mmHg possible atrial pressure.  Mild aortic insufficiency  IVC diameter >2.1 cm collapsing <50% with sniff suggests a high RA pressure  estimated at 15 mmHg or greater.  Small pericardial effusion. Measures approximate 1.1 cm posteriorly. No  suggestion of tamponade physiology by echocardiography.  Compared to the study October 2022. Heart rate is slower and now in sinus  rhythm. Estimate of LV systolic function is similar.Smalll pericardial  effsuion appears unchanged  ______________________________________________________________________________  Left Ventricle  The left ventricle is normal in size. There is normal left ventricular wall  thickness. The visual ejection fraction is 30-35%. Diastolic Doppler findings  (E/E' ratio and/or other parameters) suggest left ventricular filling  pressures are increased. There is moderate global hypokinesia of the  left  ventricle.     Right Ventricle  The right ventricle is mild to moderately dilated. Moderately decreased right  ventricular systolic function. TAPSE is abnormal, which is consistent with  abnormal right ventricular systolic function.     Atria  The left atrium is severely dilated. The right atrium is moderate to severely  dilated.     Mitral Valve  The mitral valve leaflets appear thickened, but open well. The mitral leaflets  appear thickened, hooded, and/or redundant, consistent with myxomatous  degeneration. The mitral regurgitant jet is eccentrically directed. There is  moderate to mod-severe (2-3+) mitral regurgitation.     Tricuspid Valve  The tricuspid valve is not well visualized, but is grossly normal. The right  ventricular systolic pressure is approximated at 24.2 mmHg plus the right  atrial pressure. There is moderate (2+) tricuspid regurgitation.     Aortic Valve  The aortic valve is trileaflet with aortic valve sclerosis. There is mild (1+)  aortic regurgitation. No hemodynamically significant valvular aortic stenosis.     Pulmonic Valve  The pulmonic valve is not well seen, but is grossly normal. There is trace  pulmonic valvular regurgitation.     Vessels  The aorta root is normal. Normal size ascending aorta. IVC diameter >2.1 cm  collapsing <50% with sniff suggests a high RA pressure estimated at 15 mmHg or  greater.     Pericardium  Small pericardial effusion. The pericardial effusion measures approximately  1.1 cm posteriorly         Recent Coronary Angiogram Results:    CTA Angiogram coronary artery  Order# [id number]  Reading physician: Jose C Wallace MD Ordering physician: Tyrell Correa MD Study date: 2023     Patient Information    Patient Name   Kallie Barker MRN   [id number] Legal Sex   Female              Age   1960 (63 year old)     Reason for Exam  Priority: Routine  chf, cardiomyopathy     Interpretation Summary    Minimal nonobstructive coronary artery  disease.  Mild biventricular enlargement.  Severe biatrial enlargement.  Myxomatous appearance of the mitral valve. Study protocol was not optimized to evaluate mitral valve function.          Physical Examination  Review of Systems   Vitals: 96/64, weight 141 pounds, heart rate of 75 and regular  Wt Readings from Last 3 Encounters:   03/15/23 65.8 kg (145 lb 1.6 oz)   23 67.5 kg (148 lb 14.4 oz)   10/23/22 74 kg (163 lb 3.2 oz)       General Appearance:   no distress, normal body habitus   ENT/Mouth:  Wearing a facemask      EYES:  no scleral icterus, normal conjunctivae   Neck: no carotid bruits or thyromegaly   Chest/Lungs:   lungs are clear to auscultation, no rales or wheezing, , equal chest wall expansion    Cardiovascular:   Regular. Normal first and second heart sounds with 2/6 systolic murmur, rubs, or gallops; the carotid, radial and posterior tibial pulses are intact, Jugular venous pressure within normal limits, no significant edema bilaterally    Abdomen:  no organomegaly, masses, bruits, or tenderness; bowel sounds are present   Extremities: no cyanosis or clubbing   Skin: no xanthelasma, warm.    Neurologic: , no tremors     Psychiatric: alert and oriented x3, calm        Please refer above for cardiac ROS details.        Medical History  Surgical History Family History Social History   No past medical history on file.  Past Surgical History:   Procedure Laterality Date    APPENDECTOMY      CARDIOVERSION  2018    PICC  3/13/2018          No family history on file.     Social History     Socioeconomic History    Marital status:      Spouse name: Not on file    Number of children: Not on file    Years of education: Not on file    Highest education level: Not on file   Occupational History    Not on file   Tobacco Use    Smoking status: Former     Packs/day: 1.00     Years: 30.00     Pack years: 30.00     Types: Cigarettes     Quit date: 2018     Years since quittin.1     Smokeless tobacco: Never   Vaping Use    Vaping status: Not on file   Substance and Sexual Activity    Alcohol use: No    Drug use: No    Sexual activity: Not on file   Other Topics Concern    Not on file   Social History Narrative    Not on file     Social Determinants of Health     Financial Resource Strain: Not on file   Food Insecurity: Not on file   Transportation Needs: Not on file   Physical Activity: Not on file   Stress: Not on file   Social Connections: Not on file   Intimate Partner Violence: Not on file   Housing Stability: Not on file           Medications  Allergies   Current Outpatient Medications   Medication Sig Dispense Refill    furosemide (LASIX) 20 MG tablet Take 1 tablet (20 mg) by mouth daily 90 tablet 3    losartan (COZAAR) 25 MG tablet Take 0.5 tablets (12.5 mg) by mouth daily 30 tablet 0    metoprolol succinate ER (TOPROL XL) 25 MG 24 hr tablet Take 2 tablets (50 mg) by mouth daily 180 tablet 0    multivitamin therapeutic tablet [MULTIVITAMIN THERAPEUTIC TABLET] Take 1 tablet by mouth daily.      nicotine (NICODERM CQ) 21 MG/24HR 24 hr patch Place 1 patch onto the skin daily 30 patch 0    rivaroxaban ANTICOAGULANT (XARELTO) 20 MG TABS tablet Take 1 tablet (20 mg) by mouth daily (with dinner) (Blood thinner)- need to take indefinitely to prevent blood clots 30 tablet 0     No Known Allergies       Lab Results    Chemistry/lipid CBC Cardiac Enzymes/BNP/TSH/INR   Recent Labs   Lab Test 03/05/23  0755   CHOL 193   HDL 35*   *   TRIG 86     Recent Labs   Lab Test 03/05/23  0755   *     Recent Labs   Lab Test 03/30/23  1642      POTASSIUM 4.1   CHLORIDE 103   CO2 17*   GLC 93   BUN 48.2*   CR 1.70*   GFRESTIMATED 34*   ALAN 10.0     Recent Labs   Lab Test 03/30/23  1642 03/15/23  1457 03/06/23  0732   CR 1.70* 1.02 0.90     No results for input(s): A1C in the last 63080 hours.       Recent Labs   Lab Test 03/30/23  1642   WBC 5.6   HGB 15.7   HCT 49.0*   MCV 87         Recent Labs   Lab Test 03/30/23  1642 03/03/23  1947 10/23/22  0357   HGB 15.7 14.7 12.2    Recent Labs   Lab Test 03/03/23 1947 10/21/22  0253 10/20/22  1848   TROPONINI 0.07 0.03 0.03     Recent Labs   Lab Test 03/03/23 1947 10/20/22  1848 03/19/18  0709   BNP 2,506* 3,001* 2,790*     Recent Labs   Lab Test 03/30/23  1642   TSH 1.56     Recent Labs   Lab Test 10/20/22  1848 03/12/18  1935 03/02/18  0538   INR 1.49* 3.85* 1.60*        Tyrell Correa MD                                        Thank you for allowing me to participate in the care of your patient.      Sincerely,     Tyrell Correa MD     [facility]  cc:   Tyrell Correa MD  [address] [facility]  [address]  [place] [address]

## 2023-04-13 DIAGNOSIS — I50.23 ACUTE ON CHRONIC HFREF (HEART FAILURE WITH REDUCED EJECTION FRACTION) (H): Primary | ICD-10-CM

## 2023-04-13 NOTE — PROGRESS NOTES
Tyrell Correa MD   4/13/2023  8:40 AM CDT Back to Top      Bnp much improved but creat has increased, please ask her to hold the losartan and lasix, monitor for fluid overload and rechck bmp on Monday      BMP ordered.  -elias

## 2023-04-13 NOTE — RESULT ENCOUNTER NOTE
Bnp much improved but creat has increased, please ask her to hold the losartan and lasix, monitor for fluid overload and rechck bmp on Monday

## 2023-04-19 NOTE — PROGRESS NOTES
LVM - follow-up on labs that were supposed to be completed early this week, and to schedule GLENYS.  -elias

## 2023-05-03 ENCOUNTER — OFFICE VISIT (OUTPATIENT)
Dept: CARDIOLOGY | Facility: CLINIC | Age: 63
End: 2023-05-03
Attending: NURSE PRACTITIONER
Payer: COMMERCIAL

## 2023-05-03 VITALS
OXYGEN SATURATION: 95 % | SYSTOLIC BLOOD PRESSURE: 128 MMHG | HEIGHT: 64 IN | WEIGHT: 152.1 LBS | DIASTOLIC BLOOD PRESSURE: 72 MMHG | RESPIRATION RATE: 16 BRPM | HEART RATE: 70 BPM | BODY MASS INDEX: 25.97 KG/M2

## 2023-05-03 DIAGNOSIS — I42.0 DILATED CARDIOMYOPATHY (H): ICD-10-CM

## 2023-05-03 DIAGNOSIS — N18.32 STAGE 3B CHRONIC KIDNEY DISEASE (H): ICD-10-CM

## 2023-05-03 DIAGNOSIS — J96.01 ACUTE RESPIRATORY FAILURE WITH HYPOXIA (H): ICD-10-CM

## 2023-05-03 DIAGNOSIS — Z72.0 TOBACCO USER: ICD-10-CM

## 2023-05-03 DIAGNOSIS — I34.0 NONRHEUMATIC MITRAL VALVE REGURGITATION: ICD-10-CM

## 2023-05-03 DIAGNOSIS — I48.0 PAROXYSMAL ATRIAL FIBRILLATION (H): Chronic | ICD-10-CM

## 2023-05-03 DIAGNOSIS — I50.22 CHRONIC HFREF (HEART FAILURE WITH REDUCED EJECTION FRACTION) (H): Primary | ICD-10-CM

## 2023-05-03 LAB
ANION GAP SERPL CALCULATED.3IONS-SCNC: 7 MMOL/L (ref 5–18)
BUN SERPL-MCNC: 16 MG/DL (ref 8–22)
CALCIUM SERPL-MCNC: 8.8 MG/DL (ref 8.5–10.5)
CHLORIDE BLD-SCNC: 111 MMOL/L (ref 98–107)
CO2 SERPL-SCNC: 23 MMOL/L (ref 22–31)
CREAT SERPL-MCNC: 0.79 MG/DL (ref 0.6–1.1)
GFR SERPL CREATININE-BSD FRML MDRD: 85 ML/MIN/1.73M2
GLUCOSE BLD-MCNC: 115 MG/DL (ref 70–125)
POTASSIUM BLD-SCNC: 3.6 MMOL/L (ref 3.5–5)
SODIUM SERPL-SCNC: 141 MMOL/L (ref 136–145)

## 2023-05-03 PROCEDURE — 36415 COLL VENOUS BLD VENIPUNCTURE: CPT | Performed by: NURSE PRACTITIONER

## 2023-05-03 PROCEDURE — 99214 OFFICE O/P EST MOD 30 MIN: CPT | Performed by: NURSE PRACTITIONER

## 2023-05-03 PROCEDURE — 80048 BASIC METABOLIC PNL TOTAL CA: CPT | Performed by: NURSE PRACTITIONER

## 2023-05-03 NOTE — PROGRESS NOTES
Assessment/Recommendations   Assessment:    1.  Chronic systolic heart failure with LVEF of 30 to 35% per echo on 3/4/2023, NYHA Class II:  She is well compensated with no evidence of fluid retention on exam except she declined assessment of her legs for swelling.  She reports mild shortness of breath on exertion which is unchanged from baseline.    Her current home weight is 150 pounds.  Her weight was 144 to 145 pounds during last clinic visit.  She states her weight fluctuates between 145 to 150 pounds.    Her losartan and furosemide are on hold due to worsening renal function.    She reports inconsistent with following low-sodium diet.    Heart failure regimen includes:    -Beta-blocker therapy with metoprolol succinate 25 mg daily    -ARB therapy with Losartan 25 mg daily- on hold since 4/12/2023    -Not on aldosterone blocker therapy with    -Diuretic therapy with furosemide 40 mg daily-on hold since 4/12/2023    2.  Paroxysmal atrial fibrillation: History of atrial fibrillation associated with thyrotoxicosis and Graves' disease in 2018.  Recent hospitalization October 2022 for acute pulmonary embolism.  Patient is on Xarelto for atrial fibrillation and pulmonary embolism.  Heart rate stable.    3.  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 Correa recommended GLENYS for further evaluation which is not scheduled yet.    4.  Mild coronary artery disease per CT coronary angiogram on 3/6/2023: Denies chest pain.     5.  Tobacco abuse: She continues to smoke every day.    6.  Graves' disease, thyrotoxicosis: She missed her appointment with endocrinology and planning to schedule.  She saw her PCP.  On methimazole.    Plan/Recommendation:  -BMP pending  -Consider resuming losartan and furosemide 20 mg daily if stable BMP  -Schedule Holter study and GLENYS as recommended by Dr. Correa  Highly encouraged to stay on a low-sodium diet, daily weight monitoring, and maintain adequate intake or heart  guideline    She declined to follow-up with me due to financial stress from recent hospitalization.  She agreed to follow-up with Dr. Correa as scheduled in July.     History of Present Illness/Subjective    Ms. Kallie Barker is a 63 year old female with a past medical history of paroxysmal atrial fibrillation, heart failure with reduced ejection fraction, pulmonary embolism in 2022, Graves' disease, thyrotoxicosis, and tobacco abuse who is seen at [facility] fo continued heart failure follow-up.    Patient was hospitalized from March 3 through March 6 with progressive worsening shortness of breath for several weeks.  He was found hypoxic requiring oxygen supplement.  His work-up revealed acute on chronic heart failure with reduced ejection fraction.  Chest x-ray showed cardiomegaly with pulmonary interstitial edema, small to medium size pericardial effusion, mildly enlarged mediastinal lymph node, and mild emphysema.  Echocardiogram on 3/4/2023 showed LVEF moderately reduced to 30 to 35% with severe left atrial enlargement.  Also noted moderate tricuspid regurgitation with small pericardial effusion without tamponade.  Patient was treated with IV diuretic therapy and was transitioned to oral furosemide.  Patient underwent CTA coronary suggested no significant obstruction.    Patient was last seen by me in March 2018.  She was last seen by Dr. Gonzales in April 2018  She was last seen in A-fib clinic by Sidra Baltazar CNP in July 2018    During last clinic visit, Kallie was found stable from heart failure standpoint.  She had some lightheadedness which resolved which she thought is due to starting methimazole.    She recently saw Dr. Correa.  Her lab work showed worsening renal function.  Her losartan and torsemide were put on hold.    Today, Kallie reports that she has mild shortness of breath on exertion which is unchanged from baseline.  She lost her mother-in-law recently.   "She has been very busy with family and under a lot of stress.  She has not been following low-sodium diet consistently.  She denies fatigue, lightheadedness, shortness of breath, orthopnea, PND, palpitations, chest pain, abdominal fullness/bloating and lower extremity edema.  She does not report any bleeding complications.  She expressed financial stress from recent hospitalization and healthcare bills.  She declined follow-up with me.  She agreed to follow-up with Dr. Correa as planned    ECHO from 3/3/23-Reviewed:   Interpretation Summary     Normal left-ventricular size. Left ventricular systolic function is moderately  globally reduced. Left ventricular ejection fraction estimated at 30 to  35%.Diastolic Doppler findings (E/E' ratio and/or other parameters) suggest  left ventricular filling pressures are increased.  Mild to moderate right ventricular enlargement. Mild decrease in RV systolic  function suggested.  Severe left atrial enlargement. Moderate to severe right atrial enlargement.  Myxomatous changes of the mitral valve. Eccentric mitral regurgitation that  appears moderately severe.(2-3+)  Moderate tricuspid regurgitation. Estimate of RV systolic pressure is normal  at 24 mmHg possible atrial pressure.  Mild aortic insufficiency  IVC diameter >2.1 cm collapsing <50% with sniff suggests a high RA pressure  estimated at 15 mmHg or greater.  Small pericardial effusion. Measures approximate 1.1 cm posteriorly. No  suggestion of tamponade physiology by echocardiography.  Compared to the study October 2022. Heart rate is slower and now in sinus  rhythm. Estimate of LV systolic function is similar.Smalll pericardial  effsuion appears unchanged     Physical Examination Review of Systems   /72 (BP Location: Left arm, Patient Position: Sitting, Cuff Size: Adult Regular)   Pulse 70   Resp 16   Ht 1.626 m (5' 4\")   Wt 69 kg (152 lb 1.6 oz)   SpO2 95%   BMI 26.11 kg/m    Body mass index is 26.11 kg/m .  Wt " Readings from Last 3 Encounters:   23 69 kg (152 lb 1.6 oz)   23 64 kg (141 lb)   03/15/23 65.8 kg (145 lb 1.6 oz)     General Appearance:   no distress, normal body habitus   ENT/Mouth: membranes moist, no oral lesions or bleeding gums.      EYES:  no scleral icterus, normal conjunctivae   Neck: no carotid bruits or thyromegaly   Chest/Lungs:   lungs are clear to auscultation, no rales or wheezing, equal chest wall expansion    Cardiovascular:   Heart rate regular. Normal first and second heart sounds with no murmurs, rubs, or gallops; the carotid, radial and posterior tibial pulses are intact, Jugular venous pressure flat. Declined to have her legs assess for swelling stating she didn't shave her legs   Abdomen:  no organomegaly, masses, bruits, or tenderness; bowel sounds are present   Extremities   no cyanosis or clubbing   Radial pulses and Pedal pulses intact and symmetrical.  CMS intact.   Skin: no xanthelasma, warm.    Neurologic: normal  bilateral, no tremors     Psychiatric: alert and oriented x3, calm                                      Negative unless noted in HPI     Medical History  Surgical History Family History Social History   No past medical history on file. Past Surgical History:   Procedure Laterality Date     APPENDECTOMY       CARDIOVERSION  2018     PICC  3/13/2018         No family history on file. Social History     Socioeconomic History     Marital status:      Spouse name: Not on file     Number of children: Not on file     Years of education: Not on file     Highest education level: Not on file   Occupational History     Not on file   Tobacco Use     Smoking status: Former     Packs/day: 1.00     Years: 30.00     Pack years: 30.00     Types: Cigarettes     Quit date: 2018     Years since quittin.1     Smokeless tobacco: Never   Vaping Use     Vaping status: Not on file   Substance and Sexual Activity     Alcohol use: No     Drug use: No     Sexual  activity: Not on file   Other Topics Concern     Not on file   Social History Narrative     Not on file     Social Determinants of Health     Financial Resource Strain: Not on file   Food Insecurity: Not on file   Transportation Needs: Not on file   Physical Activity: Not on file   Stress: Not on file   Social Connections: Not on file   Intimate Partner Violence: Not on file   Housing Stability: Not on file          Medications  Allergies   Current Outpatient Medications   Medication Sig Dispense Refill     methimazole (TAPAZOLE) 10 MG tablet Take 5 mg by mouth daily       metoprolol succinate ER (TOPROL XL) 25 MG 24 hr tablet Take 2 tablets (50 mg) by mouth daily 180 tablet 0     multivitamin therapeutic tablet [MULTIVITAMIN THERAPEUTIC TABLET] Take 1 tablet by mouth daily.       rivaroxaban ANTICOAGULANT (XARELTO) 20 MG TABS tablet Take 1 tablet (20 mg) by mouth daily (with dinner) (Blood thinner)- need to take indefinitely to prevent blood clots 30 tablet 0     furosemide (LASIX) 20 MG tablet Take 1 tablet (20 mg) by mouth daily (Patient not taking: Reported on 5/3/2023) 90 tablet 3     losartan (COZAAR) 25 MG tablet Take 0.5 tablets (12.5 mg) by mouth daily (Patient not taking: Reported on 5/3/2023) 30 tablet 0     nicotine (NICODERM CQ) 21 MG/24HR 24 hr patch Place 1 patch onto the skin daily (Patient not taking: Reported on 4/12/2023) 30 patch 0    No Known Allergies      Lab Results    Chemistry/lipid CBC Cardiac Enzymes/BNP/TSH/INR   Lab Results   Component Value Date    CHOL 193 03/05/2023    HDL 35 (L) 03/05/2023    TRIG 86 03/05/2023    BUN 16 05/03/2023     05/03/2023    CO2 23 05/03/2023    Lab Results   Component Value Date    WBC 5.6 03/30/2023    HGB 15.7 03/30/2023    HCT 49.0 (H) 03/30/2023    MCV 87 03/30/2023     03/30/2023    Lab Results   Component Value Date    TROPONINI 0.07 03/03/2023     (H) 04/12/2023    TSH 1.56 03/30/2023    INR 1.49 (H) 10/20/2022        32  minutes  spent on the date of encounter doing chart review, review of test results, interpretation with above tests, patient visit and documentation.        This note has been dictated using voice recognition software. Any grammatical, typographical, or context distortions are unintentional and inherent to the software

## 2023-05-03 NOTE — PATIENT INSTRUCTIONS
Kallie Barker,    It was a pleasure to see you today at the [facility].     My recommendations after this visit include:    - No medications changes made today    - Schedule Holter Study and GLENYS recommended by Dr. Correa    - Follow up with Dr. Correa in July    - Please call Heart Failure Nurse Line at [phone], if you have any questions or concerns

## 2023-05-03 NOTE — LETTER
5/3/2023    [facility]  [facility] [address] [place]  [facility] [address]    RE: Kallie Barker       Dear Colleague,     I had the pleasure of seeing Kallie Barker in the [facility].          Assessment/Recommendations   Assessment:    1.  Chronic systolic heart failure with LVEF of 30 to 35% per echo on 3/4/2023, NYHA Class II:  She is well compensated with no evidence of fluid retention on exam except she declined assessment of her legs for swelling.  She reports mild shortness of breath on exertion which is unchanged from baseline.    Her current home weight is 150 pounds.  Her weight was 144 to 145 pounds during last clinic visit.  She states her weight fluctuates between 145 to 150 pounds.    Her losartan and furosemide are on hold due to worsening renal function.    She reports inconsistent with following low-sodium diet.    Heart failure regimen includes:    -Beta-blocker therapy with metoprolol succinate 25 mg daily    -ARB therapy with Losartan 25 mg daily- on hold since 4/12/2023    -Not on aldosterone blocker therapy with    -Diuretic therapy with furosemide 40 mg daily-on hold since 4/12/2023    2.  Paroxysmal atrial fibrillation: History of atrial fibrillation associated with thyrotoxicosis and Graves' disease in 2018.  Recent hospitalization October 2022 for acute pulmonary embolism.  Patient is on Xarelto for atrial fibrillation and pulmonary embolism.  Heart rate stable.    3.  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 Correa recommended GLENYS for further evaluation which is not scheduled yet.    4.  Mild coronary artery disease per CT coronary angiogram on 3/6/2023: Denies chest pain.     5.  Tobacco abuse: She continues to smoke every day.    6.  Graves' disease, thyrotoxicosis: She missed her appointment with endocrinology and planning to schedule.  She saw her PCP.  On methimazole.    Plan/Recommendation:  -BMP  pending  -Consider resuming losartan and furosemide 20 mg daily if stable BMP  -Schedule Holter study and GLENYS as recommended by Dr. Correa  Highly encouraged to stay on a low-sodium diet, daily weight monitoring, and maintain adequate intake or heart guideline    She declined to follow-up with me due to financial stress from recent hospitalization.  She agreed to follow-up with Dr. Correa as scheduled in July.     History of Present Illness/Subjective    Ms. Kallie Barker is a 63 year old female with a past medical history of paroxysmal atrial fibrillation, heart failure with reduced ejection fraction, pulmonary embolism in 2022, Graves' disease, thyrotoxicosis, and tobacco abuse who is seen at [facility] fo continued heart failure follow-up.    Patient was hospitalized from March 3 through March 6 with progressive worsening shortness of breath for several weeks.  He was found hypoxic requiring oxygen supplement.  His work-up revealed acute on chronic heart failure with reduced ejection fraction.  Chest x-ray showed cardiomegaly with pulmonary interstitial edema, small to medium size pericardial effusion, mildly enlarged mediastinal lymph node, and mild emphysema.  Echocardiogram on 3/4/2023 showed LVEF moderately reduced to 30 to 35% with severe left atrial enlargement.  Also noted moderate tricuspid regurgitation with small pericardial effusion without tamponade.  Patient was treated with IV diuretic therapy and was transitioned to oral furosemide.  Patient underwent CTA coronary suggested no significant obstruction.    Patient was last seen by me in March 2018.  She was last seen by Dr. Gonzales in April 2018  She was last seen in A-fib clinic by Sidra Baltazar CNP in July 2018    During last clinic visit, Kallie was found stable from heart failure standpoint.  She had some lightheadedness which resolved which she thought is due to starting methimazole.    She recently saw   Madeline.  Her lab work showed worsening renal function.  Her losartan and torsemide were put on hold.    Today, Kallie reports that she has mild shortness of breath on exertion which is unchanged from baseline.  She lost her mother-in-law recently.  She has been very busy with family and under a lot of stress.  She has not been following low-sodium diet consistently.  She denies fatigue, lightheadedness, shortness of breath, orthopnea, PND, palpitations, chest pain, abdominal fullness/bloating and lower extremity edema.  She does not report any bleeding complications.  She expressed financial stress from recent hospitalization and healthcare bills.  She declined follow-up with me.  She agreed to follow-up with Dr. Correa as planned    ECHO from 3/3/23-Reviewed:   Interpretation Summary     Normal left-ventricular size. Left ventricular systolic function is moderately  globally reduced. Left ventricular ejection fraction estimated at 30 to  35%.Diastolic Doppler findings (E/E' ratio and/or other parameters) suggest  left ventricular filling pressures are increased.  Mild to moderate right ventricular enlargement. Mild decrease in RV systolic  function suggested.  Severe left atrial enlargement. Moderate to severe right atrial enlargement.  Myxomatous changes of the mitral valve. Eccentric mitral regurgitation that  appears moderately severe.(2-3+)  Moderate tricuspid regurgitation. Estimate of RV systolic pressure is normal  at 24 mmHg possible atrial pressure.  Mild aortic insufficiency  IVC diameter >2.1 cm collapsing <50% with sniff suggests a high RA pressure  estimated at 15 mmHg or greater.  Small pericardial effusion. Measures approximate 1.1 cm posteriorly. No  suggestion of tamponade physiology by echocardiography.  Compared to the study October 2022. Heart rate is slower and now in sinus  rhythm. Estimate of LV systolic function is similar.Smalll pericardial  effsuion appears unchanged     Physical Examination  "Review of Systems   /72 (BP Location: Left arm, Patient Position: Sitting, Cuff Size: Adult Regular)   Pulse 70   Resp 16   Ht 1.626 m (5' 4\")   Wt 69 kg (152 lb 1.6 oz)   SpO2 95%   BMI 26.11 kg/m    Body mass index is 26.11 kg/m .  Wt Readings from Last 3 Encounters:   05/03/23 69 kg (152 lb 1.6 oz)   04/12/23 64 kg (141 lb)   03/15/23 65.8 kg (145 lb 1.6 oz)     General Appearance:   no distress, normal body habitus   ENT/Mouth: membranes moist, no oral lesions or bleeding gums.      EYES:  no scleral icterus, normal conjunctivae   Neck: no carotid bruits or thyromegaly   Chest/Lungs:   lungs are clear to auscultation, no rales or wheezing, equal chest wall expansion    Cardiovascular:   Heart rate regular. Normal first and second heart sounds with no murmurs, rubs, or gallops; the carotid, radial and posterior tibial pulses are intact, Jugular venous pressure flat. Declined to have her legs assess for swelling stating she didn't shave her legs   Abdomen:  no organomegaly, masses, bruits, or tenderness; bowel sounds are present   Extremities   no cyanosis or clubbing   Radial pulses and Pedal pulses intact and symmetrical.  CMS intact.   Skin: no xanthelasma, warm.    Neurologic: normal  bilateral, no tremors     Psychiatric: alert and oriented x3, calm                                      Negative unless noted in HPI     Medical History  Surgical History Family History Social History   No past medical history on file. Past Surgical History:   Procedure Laterality Date    APPENDECTOMY      CARDIOVERSION  05/04/2018    PICC  3/13/2018         No family history on file. Social History     Socioeconomic History    Marital status:      Spouse name: Not on file    Number of children: Not on file    Years of education: Not on file    Highest education level: Not on file   Occupational History    Not on file   Tobacco Use    Smoking status: Former     Packs/day: 1.00     Years: 30.00     Pack " years: 30.00     Types: Cigarettes     Quit date: 2018     Years since quittin.1    Smokeless tobacco: Never   Vaping Use    Vaping status: Not on file   Substance and Sexual Activity    Alcohol use: No    Drug use: No    Sexual activity: Not on file   Other Topics Concern    Not on file   Social History Narrative    Not on file     Social Determinants of Health     Financial Resource Strain: Not on file   Food Insecurity: Not on file   Transportation Needs: Not on file   Physical Activity: Not on file   Stress: Not on file   Social Connections: Not on file   Intimate Partner Violence: Not on file   Housing Stability: Not on file          Medications  Allergies   Current Outpatient Medications   Medication Sig Dispense Refill    methimazole (TAPAZOLE) 10 MG tablet Take 5 mg by mouth daily      metoprolol succinate ER (TOPROL XL) 25 MG 24 hr tablet Take 2 tablets (50 mg) by mouth daily 180 tablet 0    multivitamin therapeutic tablet [MULTIVITAMIN THERAPEUTIC TABLET] Take 1 tablet by mouth daily.      rivaroxaban ANTICOAGULANT (XARELTO) 20 MG TABS tablet Take 1 tablet (20 mg) by mouth daily (with dinner) (Blood thinner)- need to take indefinitely to prevent blood clots 30 tablet 0    furosemide (LASIX) 20 MG tablet Take 1 tablet (20 mg) by mouth daily (Patient not taking: Reported on 5/3/2023) 90 tablet 3    losartan (COZAAR) 25 MG tablet Take 0.5 tablets (12.5 mg) by mouth daily (Patient not taking: Reported on 5/3/2023) 30 tablet 0    nicotine (NICODERM CQ) 21 MG/24HR 24 hr patch Place 1 patch onto the skin daily (Patient not taking: Reported on 2023) 30 patch 0    No Known Allergies      Lab Results    Chemistry/lipid CBC Cardiac Enzymes/BNP/TSH/INR   Lab Results   Component Value Date    CHOL 193 2023    HDL 35 (L) 2023    TRIG 86 2023    BUN 16 2023     2023    CO2 23 2023    Lab Results   Component Value Date    WBC 5.6 2023    HGB 15.7 2023     HCT 49.0 (H) 03/30/2023    MCV 87 03/30/2023     03/30/2023    Lab Results   Component Value Date    TROPONINI 0.07 03/03/2023     (H) 04/12/2023    TSH 1.56 03/30/2023    INR 1.49 (H) 10/20/2022        32  minutes spent on the date of encounter doing chart review, review of test results, interpretation with above tests, patient visit and documentation.        This note has been dictated using voice recognition software. Any grammatical, typographical, or context distortions are unintentional and inherent to the software          Thank you for allowing me to participate in the care of your patient.      Sincerely,     HARINDER Hubbard CNP     [facility]  cc:   HARINDER Hubbard CNP  [address]  [place] [address]

## 2023-05-10 ENCOUNTER — TELEPHONE (OUTPATIENT)
Dept: CARDIOLOGY | Facility: CLINIC | Age: 63
End: 2023-05-10
Payer: COMMERCIAL

## 2023-05-10 DIAGNOSIS — R93.1 DECREASED CARDIAC EJECTION FRACTION: Chronic | ICD-10-CM

## 2023-05-10 NOTE — TELEPHONE ENCOUNTER
LVM to call for recommendations.  -rah    ===View-only below this line===  ----- Message -----  From: Lily Olivas APRN CNP  Sent: 5/9/2023  12:29 PM CDT  To: Tyrell Correa MD; [facility]    She agreed to schedule GLENYS but looks like she has not scheduled    Hi Ana Jaimie,  Please have her resume furosemide 20 mg daily and losartan 25 mg daily and repeat BMP in a week  as recommended by Dr. Correa.    Thank you!  CY

## 2023-05-11 DIAGNOSIS — R93.1 DECREASED CARDIAC EJECTION FRACTION: Chronic | ICD-10-CM

## 2023-05-11 RX ORDER — LOSARTAN POTASSIUM 25 MG/1
25 TABLET ORAL DAILY
Qty: 30 TABLET | Refills: 1 | Status: SHIPPED | OUTPATIENT
Start: 2023-05-11 | End: 2023-06-29

## 2023-05-11 RX ORDER — FUROSEMIDE 20 MG
20 TABLET ORAL DAILY
Qty: 30 TABLET | Refills: 1 | Status: SHIPPED | OUTPATIENT
Start: 2023-05-11

## 2023-05-11 NOTE — TELEPHONE ENCOUNTER
Recommendations discussed with pt.  Pt verbalized understanding and had no questions.    She still has furosemide and losartan at home, prescriptions updated though.  BMP ordered and pt will have done at the hospital.    Message sent to GLENYS schedulers.  eric   Detail Level: Zone Detail Level: Simple Detail Level: Generalized Topical Retinoids Recommendations: Discussed that Retinoids can be used to apply diffusely to help prevent further lesions Sunscreen Recommendations: Discussed importance of photo protection with sun screen and photo protective clothing. Detail Level: Detailed

## 2023-05-12 RX ORDER — LOSARTAN POTASSIUM 25 MG/1
TABLET ORAL
Qty: 90 TABLET | OUTPATIENT
Start: 2023-05-12

## 2023-05-12 NOTE — TELEPHONE ENCOUNTER
----- Message from Mel Richmond sent at 5/12/2023 10:03 AM CDT -----  Regarding: RE: GLENYS  LM X2 to schedule  ----- Message -----  From: Ana Rowell RN  Sent: 5/11/2023   4:11 PM CDT  To: Mel Richmond; Hazel Zambrano  Subject: GLENYS                                              Pt ready to schedule GLENYS.  H&P would be 5/3/23 with Lily.    Thank you!    Ana Etienne

## 2023-05-18 NOTE — TELEPHONE ENCOUNTER
LM to schedule X2 5/12/2023 pjp  H&P 5/3/2023 Lily BARTLETT to schedule 4/18/2023 pjp  H&P 4/12/2023 Dr Correa

## 2023-06-28 DIAGNOSIS — R93.1 DECREASED CARDIAC EJECTION FRACTION: Chronic | ICD-10-CM

## 2023-06-29 RX ORDER — LOSARTAN POTASSIUM 25 MG/1
TABLET ORAL
Qty: 90 TABLET | Refills: 3 | Status: SHIPPED | OUTPATIENT
Start: 2023-06-29

## 2024-05-28 ENCOUNTER — LAB REQUISITION (OUTPATIENT)
Dept: LAB | Facility: CLINIC | Age: 64
End: 2024-05-28
Payer: COMMERCIAL

## 2024-05-28 DIAGNOSIS — D64.9 ANEMIA, UNSPECIFIED: ICD-10-CM

## 2024-05-28 LAB
ANION GAP SERPL CALCULATED.3IONS-SCNC: 14 MMOL/L (ref 7–15)
BASOPHILS # BLD AUTO: 0 10E3/UL (ref 0–0.2)
BASOPHILS NFR BLD AUTO: 1 %
BUN SERPL-MCNC: 20 MG/DL (ref 8–23)
CALCIUM SERPL-MCNC: 9.4 MG/DL (ref 8.8–10.2)
CHLORIDE SERPL-SCNC: 102 MMOL/L (ref 98–107)
CREAT SERPL-MCNC: 0.72 MG/DL (ref 0.51–0.95)
DEPRECATED HCO3 PLAS-SCNC: 18 MMOL/L (ref 22–29)
EGFRCR SERPLBLD CKD-EPI 2021: >90 ML/MIN/1.73M2
EOSINOPHIL # BLD AUTO: 0.1 10E3/UL (ref 0–0.7)
EOSINOPHIL NFR BLD AUTO: 2 %
ERYTHROCYTE [DISTWIDTH] IN BLOOD BY AUTOMATED COUNT: 20.2 % (ref 10–15)
GLUCOSE SERPL-MCNC: 114 MG/DL (ref 70–99)
HCT VFR BLD AUTO: 28.1 % (ref 35–47)
HGB BLD-MCNC: 8.7 G/DL (ref 11.7–15.7)
IMM GRANULOCYTES # BLD: 0.1 10E3/UL
IMM GRANULOCYTES NFR BLD: 1 %
LYMPHOCYTES # BLD AUTO: 0.9 10E3/UL (ref 0.8–5.3)
LYMPHOCYTES NFR BLD AUTO: 10 %
MCH RBC QN AUTO: 27.3 PG (ref 26.5–33)
MCHC RBC AUTO-ENTMCNC: 31 G/DL (ref 31.5–36.5)
MCV RBC AUTO: 88 FL (ref 78–100)
MONOCYTES # BLD AUTO: 0.9 10E3/UL (ref 0–1.3)
MONOCYTES NFR BLD AUTO: 11 %
NEUTROPHILS # BLD AUTO: 6.4 10E3/UL (ref 1.6–8.3)
NEUTROPHILS NFR BLD AUTO: 75 %
NRBC # BLD AUTO: 0 10E3/UL
NRBC BLD AUTO-RTO: 0 /100
PLATELET # BLD AUTO: 587 10E3/UL (ref 150–450)
POTASSIUM SERPL-SCNC: 5 MMOL/L (ref 3.4–5.3)
RBC # BLD AUTO: 3.19 10E6/UL (ref 3.8–5.2)
SODIUM SERPL-SCNC: 134 MMOL/L (ref 135–145)
WBC # BLD AUTO: 8.4 10E3/UL (ref 4–11)

## 2024-05-28 PROCEDURE — 85025 COMPLETE CBC W/AUTO DIFF WBC: CPT | Mod: ORL | Performed by: NURSE PRACTITIONER

## 2024-05-28 PROCEDURE — 80048 BASIC METABOLIC PNL TOTAL CA: CPT | Mod: ORL | Performed by: NURSE PRACTITIONER

## 2025-03-10 ENCOUNTER — LAB REQUISITION (OUTPATIENT)
Dept: LAB | Facility: CLINIC | Age: 65
End: 2025-03-10
Payer: COMMERCIAL

## 2025-03-10 DIAGNOSIS — G51.0 BELL'S PALSY: ICD-10-CM

## 2025-03-10 DIAGNOSIS — R29.810 FACIAL WEAKNESS: ICD-10-CM

## 2025-03-10 LAB
BASOPHILS # BLD AUTO: 0 10E3/UL (ref 0–0.2)
BASOPHILS NFR BLD AUTO: 1 %
EOSINOPHIL # BLD AUTO: 0 10E3/UL (ref 0–0.7)
EOSINOPHIL NFR BLD AUTO: 1 %
ERYTHROCYTE [DISTWIDTH] IN BLOOD BY AUTOMATED COUNT: 17.5 % (ref 10–15)
ERYTHROCYTE [SEDIMENTATION RATE] IN BLOOD BY WESTERGREN METHOD: 18 MM/HR (ref 0–30)
HCT VFR BLD AUTO: 39.7 % (ref 35–47)
HGB BLD-MCNC: 12.2 G/DL (ref 11.7–15.7)
IMM GRANULOCYTES # BLD: 0 10E3/UL
IMM GRANULOCYTES NFR BLD: 0 %
LYMPHOCYTES # BLD AUTO: 0.9 10E3/UL (ref 0.8–5.3)
LYMPHOCYTES NFR BLD AUTO: 18 %
MCH RBC QN AUTO: 25.1 PG (ref 26.5–33)
MCHC RBC AUTO-ENTMCNC: 30.7 G/DL (ref 31.5–36.5)
MCV RBC AUTO: 82 FL (ref 78–100)
MONOCYTES # BLD AUTO: 0.5 10E3/UL (ref 0–1.3)
MONOCYTES NFR BLD AUTO: 11 %
NEUTROPHILS # BLD AUTO: 3.3 10E3/UL (ref 1.6–8.3)
NEUTROPHILS NFR BLD AUTO: 70 %
NRBC # BLD AUTO: 0 10E3/UL
NRBC BLD AUTO-RTO: 0 /100
PLATELET # BLD AUTO: 324 10E3/UL (ref 150–450)
RBC # BLD AUTO: 4.86 10E6/UL (ref 3.8–5.2)
WBC # BLD AUTO: 4.8 10E3/UL (ref 4–11)

## 2025-03-10 PROCEDURE — 85652 RBC SED RATE AUTOMATED: CPT | Mod: ORL | Performed by: INTERNAL MEDICINE

## 2025-03-10 PROCEDURE — 86618 LYME DISEASE ANTIBODY: CPT | Mod: ORL | Performed by: INTERNAL MEDICINE

## 2025-03-10 PROCEDURE — 86140 C-REACTIVE PROTEIN: CPT | Mod: ORL | Performed by: INTERNAL MEDICINE

## 2025-03-10 PROCEDURE — 85025 COMPLETE CBC W/AUTO DIFF WBC: CPT | Mod: ORL | Performed by: INTERNAL MEDICINE

## 2025-03-10 PROCEDURE — 84443 ASSAY THYROID STIM HORMONE: CPT | Mod: ORL | Performed by: INTERNAL MEDICINE

## 2025-03-10 PROCEDURE — 80053 COMPREHEN METABOLIC PANEL: CPT | Mod: ORL | Performed by: INTERNAL MEDICINE

## 2025-03-11 LAB
ALBUMIN SERPL BCG-MCNC: 4 G/DL (ref 3.5–5.2)
ALP SERPL-CCNC: 145 U/L (ref 40–150)
ALT SERPL W P-5'-P-CCNC: 14 U/L (ref 0–50)
ANION GAP SERPL CALCULATED.3IONS-SCNC: 14 MMOL/L (ref 7–15)
AST SERPL W P-5'-P-CCNC: 19 U/L (ref 0–45)
B BURGDOR IGG+IGM SER QL: 0.13
BILIRUB SERPL-MCNC: 0.3 MG/DL
BUN SERPL-MCNC: 15.4 MG/DL (ref 8–23)
CALCIUM SERPL-MCNC: 9.8 MG/DL (ref 8.8–10.4)
CHLORIDE SERPL-SCNC: 107 MMOL/L (ref 98–107)
CREAT SERPL-MCNC: 0.88 MG/DL (ref 0.51–0.95)
CRP SERPL-MCNC: 21.1 MG/L
EGFRCR SERPLBLD CKD-EPI 2021: 73 ML/MIN/1.73M2
GLUCOSE SERPL-MCNC: 96 MG/DL (ref 70–99)
HCO3 SERPL-SCNC: 20 MMOL/L (ref 22–29)
POTASSIUM SERPL-SCNC: 3.3 MMOL/L (ref 3.4–5.3)
PROT SERPL-MCNC: 7.2 G/DL (ref 6.4–8.3)
SODIUM SERPL-SCNC: 141 MMOL/L (ref 135–145)
TSH SERPL DL<=0.005 MIU/L-ACNC: 0.01 UIU/ML (ref 0.3–4.2)

## 2025-03-25 ENCOUNTER — TRANSCRIBE ORDERS (OUTPATIENT)
Dept: OTHER | Age: 65
End: 2025-03-25

## 2025-03-25 DIAGNOSIS — G51.0 BELL'S PALSY: Primary | ICD-10-CM

## 2025-03-27 ENCOUNTER — LAB REQUISITION (OUTPATIENT)
Dept: LAB | Facility: CLINIC | Age: 65
End: 2025-03-27
Payer: COMMERCIAL

## 2025-03-27 DIAGNOSIS — G51.0 BELL'S PALSY: ICD-10-CM

## 2025-03-27 DIAGNOSIS — R68.89 OTHER GENERAL SYMPTOMS AND SIGNS: ICD-10-CM

## 2025-03-27 LAB
T3 SERPL-MCNC: <20 NG/DL (ref 85–202)
T4 FREE SERPL-MCNC: 0.49 NG/DL (ref 0.9–1.7)
TSH SERPL DL<=0.005 MIU/L-ACNC: 1.26 UIU/ML (ref 0.3–4.2)

## 2025-03-27 PROCEDURE — 84439 ASSAY OF FREE THYROXINE: CPT | Mod: ORL | Performed by: INTERNAL MEDICINE

## 2025-03-27 PROCEDURE — 86376 MICROSOMAL ANTIBODY EACH: CPT | Mod: ORL | Performed by: INTERNAL MEDICINE

## 2025-03-27 PROCEDURE — 84480 ASSAY TRIIODOTHYRONINE (T3): CPT | Mod: ORL | Performed by: INTERNAL MEDICINE

## 2025-03-27 PROCEDURE — 84443 ASSAY THYROID STIM HORMONE: CPT | Mod: ORL | Performed by: INTERNAL MEDICINE

## 2025-03-28 LAB — THYROPEROXIDASE AB SERPL-ACNC: 3778 IU/ML
